# Patient Record
Sex: FEMALE | Race: WHITE | Employment: OTHER | URBAN - METROPOLITAN AREA
[De-identification: names, ages, dates, MRNs, and addresses within clinical notes are randomized per-mention and may not be internally consistent; named-entity substitution may affect disease eponyms.]

---

## 2018-07-27 ENCOUNTER — TELEPHONE (OUTPATIENT)
Dept: ENT CLINIC | Age: 78
End: 2018-07-27

## 2018-07-27 NOTE — TELEPHONE ENCOUNTER
Patient is scheduled on 09-25-18 for blowing blood out of her nose at times (patient had seen her PCP and maxwell Coyle Already). Patient is added to the waitlist. Please call patient for sooner appt if available, thank you.

## 2018-09-25 ENCOUNTER — TELEPHONE (OUTPATIENT)
Dept: ENT CLINIC | Age: 78
End: 2018-09-25

## 2018-09-25 ENCOUNTER — OFFICE VISIT (OUTPATIENT)
Dept: ENT CLINIC | Age: 78
End: 2018-09-25
Payer: MEDICARE

## 2018-09-25 VITALS
WEIGHT: 150 LBS | SYSTOLIC BLOOD PRESSURE: 152 MMHG | BODY MASS INDEX: 27.6 KG/M2 | DIASTOLIC BLOOD PRESSURE: 92 MMHG | HEART RATE: 69 BPM | OXYGEN SATURATION: 98 % | HEIGHT: 62 IN

## 2018-09-25 DIAGNOSIS — R09.89 GLOBUS SENSATION: ICD-10-CM

## 2018-09-25 DIAGNOSIS — R49.0 HOARSENESS OF VOICE: ICD-10-CM

## 2018-09-25 DIAGNOSIS — J30.1 SEASONAL ALLERGIC RHINITIS DUE TO POLLEN: Primary | ICD-10-CM

## 2018-09-25 PROCEDURE — 99204 OFFICE O/P NEW MOD 45 MIN: CPT | Performed by: OTOLARYNGOLOGY

## 2018-09-25 RX ORDER — CITALOPRAM 10 MG/1
10 TABLET ORAL DAILY
COMMUNITY
End: 2019-06-18

## 2018-09-25 RX ORDER — HYDROCHLOROTHIAZIDE 25 MG/1
25 TABLET ORAL DAILY
COMMUNITY
End: 2019-05-13 | Stop reason: SDUPTHER

## 2018-09-25 RX ORDER — BENZONATATE 100 MG/1
100 CAPSULE ORAL 3 TIMES DAILY PRN
COMMUNITY
End: 2019-04-09 | Stop reason: ALTCHOICE

## 2018-09-25 RX ORDER — SIMVASTATIN 10 MG
10 TABLET ORAL NIGHTLY
COMMUNITY
End: 2019-06-18

## 2018-09-25 RX ORDER — MELOXICAM 15 MG/1
15 TABLET ORAL DAILY
COMMUNITY
End: 2019-06-18

## 2018-09-25 RX ORDER — GABAPENTIN 300 MG/1
300 CAPSULE ORAL 4 TIMES DAILY
COMMUNITY
End: 2019-10-25 | Stop reason: SDUPTHER

## 2018-09-25 RX ORDER — LANOLIN ALCOHOL/MO/W.PET/CERES
1000 CREAM (GRAM) TOPICAL DAILY
COMMUNITY
End: 2022-06-03

## 2018-09-25 RX ORDER — FLUTICASONE PROPIONATE 50 MCG
2 SPRAY, SUSPENSION (ML) NASAL DAILY
Qty: 1 BOTTLE | Refills: 3 | Status: SHIPPED | OUTPATIENT
Start: 2018-09-25 | End: 2019-04-09 | Stop reason: ALTCHOICE

## 2018-09-25 RX ORDER — CARVEDILOL 12.5 MG/1
12.5 TABLET ORAL 2 TIMES DAILY WITH MEALS
COMMUNITY
End: 2019-10-25 | Stop reason: SDUPTHER

## 2018-09-25 RX ORDER — TIZANIDINE HYDROCHLORIDE 4 MG/1
4 CAPSULE, GELATIN COATED ORAL 3 TIMES DAILY PRN
Status: ON HOLD | COMMUNITY
End: 2019-05-06 | Stop reason: SDUPTHER

## 2018-09-25 ASSESSMENT — ENCOUNTER SYMPTOMS
EYES NEGATIVE: 1
SINUS PRESSURE: 1
GASTROINTESTINAL NEGATIVE: 1
RESPIRATORY NEGATIVE: 1
SHORTNESS OF BREATH: 0
RHINORRHEA: 1
ABDOMINAL PAIN: 0
COLOR CHANGE: 0

## 2018-09-25 NOTE — PROGRESS NOTES
Subjective:      Patient ID:  Kanu Borges is a 66 y.o. female. HPI:    Hoarseness  Patient presents with hoarseness. The patient describes moderate episodes of hoarseness which are unchanged over time. The episodes began 8 month(s) ago. Symptoms of gastroesophageal reflux is noted. Tx: Prilosec and none     Symptoms of allergic rhinitis is noted. Tx: nothing additional PT had tried a nasal spray but gave her a headache      Trauma/Surgeries in the chest or neck? no  Type:     Previous prolonged intubation: no  Time:   ago. Pt has noted PND for years and thinks she has had sinus issues as well. History   Smoking Status    Never Smoker   Smokeless Tobacco    Never Used     Social History     Social History    Marital status: Single     Spouse name: N/A    Number of children: N/A    Years of education: N/A     Social History Main Topics    Smoking status: Never Smoker    Smokeless tobacco: Never Used    Alcohol use Yes      Comment: occ    Drug use: No    Sexual activity: Not Asked     Other Topics Concern    None     Social History Narrative    None           Patient's medications, allergies, past medical, surgical, social and family histories were reviewed and updated as appropriate. Review of Systems   Constitutional: Negative. Negative for fever. HENT: Positive for congestion, postnasal drip, rhinorrhea, sinus pressure and sneezing. Eyes: Negative. Negative for visual disturbance. Respiratory: Negative. Negative for shortness of breath. Cardiovascular: Negative. Negative for chest pain. Gastrointestinal: Negative. Negative for abdominal pain. Genitourinary: Negative. Musculoskeletal: Negative. Skin: Negative. Negative for color change. Allergic/Immunologic: Positive for environmental allergies. Neurological: Negative. Psychiatric/Behavioral: Negative. Negative for behavioral problems and hallucinations.    All other systems

## 2018-09-25 NOTE — TELEPHONE ENCOUNTER
Patient called needing rx for flonase sent to [facility] on [address] instead of [employer]. I called [facility] and cxl the rx and had it filled at [facility] on [address].     Electronically signed by Creighton Eisenmenger, MA on 9/25/18 at 9:25 AM

## 2018-11-02 ENCOUNTER — OFFICE VISIT (OUTPATIENT)
Dept: ENT CLINIC | Age: 78
End: 2018-11-02
Payer: MEDICARE

## 2018-11-02 VITALS
BODY MASS INDEX: 26.22 KG/M2 | SYSTOLIC BLOOD PRESSURE: 141 MMHG | HEART RATE: 60 BPM | DIASTOLIC BLOOD PRESSURE: 82 MMHG | WEIGHT: 148 LBS | OXYGEN SATURATION: 97 % | HEIGHT: 63 IN

## 2018-11-02 DIAGNOSIS — R49.0 HOARSENESS OF VOICE: ICD-10-CM

## 2018-11-02 DIAGNOSIS — R09.89 GLOBUS SENSATION: ICD-10-CM

## 2018-11-02 DIAGNOSIS — J30.1 SEASONAL ALLERGIC RHINITIS DUE TO POLLEN: Primary | ICD-10-CM

## 2018-11-02 PROCEDURE — 99213 OFFICE O/P EST LOW 20 MIN: CPT | Performed by: OTOLARYNGOLOGY

## 2018-11-02 RX ORDER — AZELASTINE 1 MG/ML
1 SPRAY, METERED NASAL 2 TIMES DAILY
Qty: 1 BOTTLE | Refills: 3 | Status: SHIPPED | OUTPATIENT
Start: 2018-11-02 | End: 2019-02-12 | Stop reason: CLARIF

## 2018-11-02 ASSESSMENT — ENCOUNTER SYMPTOMS
GASTROINTESTINAL NEGATIVE: 1
RESPIRATORY NEGATIVE: 1
SINUS PRESSURE: 1
SHORTNESS OF BREATH: 0
RHINORRHEA: 1
COLOR CHANGE: 0
ABDOMINAL PAIN: 0
EYES NEGATIVE: 1

## 2018-12-18 ENCOUNTER — OFFICE VISIT (OUTPATIENT)
Dept: PAIN MANAGEMENT | Age: 78
End: 2018-12-18
Payer: MEDICARE

## 2018-12-18 VITALS
BODY MASS INDEX: 25.76 KG/M2 | TEMPERATURE: 98 F | HEART RATE: 64 BPM | HEIGHT: 62 IN | DIASTOLIC BLOOD PRESSURE: 68 MMHG | RESPIRATION RATE: 16 BRPM | WEIGHT: 140 LBS | SYSTOLIC BLOOD PRESSURE: 132 MMHG

## 2018-12-18 DIAGNOSIS — G89.4 CHRONIC PAIN SYNDROME: Primary | ICD-10-CM

## 2018-12-18 DIAGNOSIS — G89.29 CHRONIC BILATERAL LOW BACK PAIN WITH RIGHT-SIDED SCIATICA: ICD-10-CM

## 2018-12-18 DIAGNOSIS — M54.41 CHRONIC BILATERAL LOW BACK PAIN WITH RIGHT-SIDED SCIATICA: ICD-10-CM

## 2018-12-18 DIAGNOSIS — M96.1 LUMBAR POST-LAMINECTOMY SYNDROME: ICD-10-CM

## 2018-12-18 DIAGNOSIS — M54.16 LUMBAR RADICULOPATHY: ICD-10-CM

## 2018-12-18 PROCEDURE — 99204 OFFICE O/P NEW MOD 45 MIN: CPT | Performed by: PAIN MEDICINE

## 2018-12-18 RX ORDER — PANTOPRAZOLE SODIUM 40 MG/1
40 GRANULE, DELAYED RELEASE ORAL
COMMUNITY
End: 2019-06-18

## 2018-12-18 ASSESSMENT — PATIENT HEALTH QUESTIONNAIRE - PHQ9
2. FEELING DOWN, DEPRESSED OR HOPELESS: 0
SUM OF ALL RESPONSES TO PHQ9 QUESTIONS 1 & 2: 0
SUM OF ALL RESPONSES TO PHQ QUESTIONS 1-9: 0
SUM OF ALL RESPONSES TO PHQ QUESTIONS 1-9: 0
1. LITTLE INTEREST OR PLEASURE IN DOING THINGS: 0

## 2018-12-20 ENCOUNTER — HOSPITAL ENCOUNTER (OUTPATIENT)
Age: 78
Discharge: HOME | End: 2018-12-22
Payer: MEDICARE

## 2018-12-20 DIAGNOSIS — G89.29 CHRONIC BILATERAL LOW BACK PAIN WITH RIGHT-SIDED SCIATICA: ICD-10-CM

## 2018-12-20 DIAGNOSIS — M54.16 LUMBAR RADICULOPATHY: ICD-10-CM

## 2018-12-20 DIAGNOSIS — M96.1 LUMBAR POST-LAMINECTOMY SYNDROME: ICD-10-CM

## 2018-12-20 DIAGNOSIS — G89.4 CHRONIC PAIN SYNDROME: ICD-10-CM

## 2018-12-20 DIAGNOSIS — M54.41 CHRONIC BILATERAL LOW BACK PAIN WITH RIGHT-SIDED SCIATICA: ICD-10-CM

## 2018-12-20 LAB
BUN BLDV-MCNC: 13 MG/DL (ref 8–23)
CREAT SERPL-MCNC: 0.8 MG/DL (ref 0.5–1)
GFR AFRICAN AMERICAN: >60
GFR NON-AFRICAN AMERICAN: >60 ML/MIN/1.73

## 2018-12-20 PROCEDURE — 36415 COLL VENOUS BLD VENIPUNCTURE: CPT

## 2018-12-20 PROCEDURE — 82565 ASSAY OF CREATININE: CPT

## 2018-12-20 PROCEDURE — 84520 ASSAY OF UREA NITROGEN: CPT

## 2019-01-16 ENCOUNTER — OFFICE VISIT (OUTPATIENT)
Dept: PAIN MANAGEMENT | Age: 79
End: 2019-01-16
Payer: MEDICARE

## 2019-01-16 VITALS
TEMPERATURE: 98.4 F | HEIGHT: 63 IN | SYSTOLIC BLOOD PRESSURE: 124 MMHG | OXYGEN SATURATION: 98 % | HEART RATE: 78 BPM | RESPIRATION RATE: 18 BRPM | BODY MASS INDEX: 25.69 KG/M2 | DIASTOLIC BLOOD PRESSURE: 70 MMHG | WEIGHT: 145 LBS

## 2019-01-16 DIAGNOSIS — G89.29 CHRONIC BILATERAL LOW BACK PAIN WITH RIGHT-SIDED SCIATICA: ICD-10-CM

## 2019-01-16 DIAGNOSIS — M54.41 CHRONIC BILATERAL LOW BACK PAIN WITH RIGHT-SIDED SCIATICA: ICD-10-CM

## 2019-01-16 DIAGNOSIS — M54.16 LUMBAR RADICULOPATHY: ICD-10-CM

## 2019-01-16 DIAGNOSIS — M96.1 LUMBAR POST-LAMINECTOMY SYNDROME: ICD-10-CM

## 2019-01-16 DIAGNOSIS — G89.4 CHRONIC PAIN SYNDROME: Primary | ICD-10-CM

## 2019-01-16 PROCEDURE — 99213 OFFICE O/P EST LOW 20 MIN: CPT | Performed by: PAIN MEDICINE

## 2019-01-16 RX ORDER — HYDROCODONE BITARTRATE AND ACETAMINOPHEN 5; 325 MG/1; MG/1
1 TABLET ORAL 3 TIMES DAILY PRN
Qty: 90 TABLET | Refills: 0 | Status: SHIPPED | OUTPATIENT
Start: 2019-01-16 | End: 2019-02-12 | Stop reason: SDUPTHER

## 2019-01-21 ENCOUNTER — OFFICE VISIT (OUTPATIENT)
Dept: PAIN MANAGEMENT | Age: 79
End: 2019-01-21
Payer: MEDICARE

## 2019-01-21 DIAGNOSIS — F45.1 SOMATIC SYMPTOM DISORDER, PERSISTENT, SEVERE, WITH PREDOMINANT PAIN: Primary | ICD-10-CM

## 2019-01-21 PROCEDURE — 96130 PSYCL TST EVAL PHYS/QHP 1ST: CPT | Performed by: PSYCHOLOGIST

## 2019-02-01 ENCOUNTER — HOSPITAL ENCOUNTER (OUTPATIENT)
Age: 79
Discharge: HOME | End: 2019-02-01
Payer: MEDICARE

## 2019-02-01 ENCOUNTER — HOSPITAL ENCOUNTER (OUTPATIENT)
Dept: MRI IMAGING | Age: 79
Discharge: HOME | End: 2019-02-03
Payer: MEDICARE

## 2019-02-01 LAB
BUN BLDV-MCNC: 17 MG/DL (ref 8–23)
CREAT SERPL-MCNC: 0.8 MG/DL (ref 0.5–1)
GFR AFRICAN AMERICAN: >60
GFR NON-AFRICAN AMERICAN: >60 ML/MIN/1.73

## 2019-02-01 PROCEDURE — 72146 MRI CHEST SPINE W/O DYE: CPT

## 2019-02-01 PROCEDURE — 72158 MRI LUMBAR SPINE W/O & W/DYE: CPT

## 2019-02-01 PROCEDURE — 84520 ASSAY OF UREA NITROGEN: CPT

## 2019-02-01 PROCEDURE — A9577 INJ MULTIHANCE: HCPCS | Performed by: RADIOLOGY

## 2019-02-01 PROCEDURE — 36415 COLL VENOUS BLD VENIPUNCTURE: CPT

## 2019-02-01 PROCEDURE — 6360000004 HC RX CONTRAST MEDICATION: Performed by: RADIOLOGY

## 2019-02-01 PROCEDURE — 82565 ASSAY OF CREATININE: CPT

## 2019-02-01 RX ADMIN — GADOBENATE DIMEGLUMINE 15 ML: 529 INJECTION, SOLUTION INTRAVENOUS at 13:06

## 2019-02-12 ENCOUNTER — OFFICE VISIT (OUTPATIENT)
Dept: PAIN MANAGEMENT | Age: 79
End: 2019-02-12
Payer: MEDICARE

## 2019-02-12 VITALS
WEIGHT: 143 LBS | SYSTOLIC BLOOD PRESSURE: 120 MMHG | DIASTOLIC BLOOD PRESSURE: 80 MMHG | RESPIRATION RATE: 16 BRPM | TEMPERATURE: 98.4 F | OXYGEN SATURATION: 92 % | BODY MASS INDEX: 25.34 KG/M2 | HEIGHT: 63 IN | HEART RATE: 73 BPM

## 2019-02-12 DIAGNOSIS — M96.1 LUMBAR POST-LAMINECTOMY SYNDROME: ICD-10-CM

## 2019-02-12 DIAGNOSIS — M54.41 CHRONIC BILATERAL LOW BACK PAIN WITH RIGHT-SIDED SCIATICA: ICD-10-CM

## 2019-02-12 DIAGNOSIS — M54.16 LUMBAR RADICULOPATHY: ICD-10-CM

## 2019-02-12 DIAGNOSIS — G89.29 CHRONIC BILATERAL LOW BACK PAIN WITH RIGHT-SIDED SCIATICA: ICD-10-CM

## 2019-02-12 DIAGNOSIS — G89.4 CHRONIC PAIN SYNDROME: Primary | ICD-10-CM

## 2019-02-12 PROCEDURE — 99213 OFFICE O/P EST LOW 20 MIN: CPT | Performed by: PAIN MEDICINE

## 2019-02-12 PROCEDURE — 99214 OFFICE O/P EST MOD 30 MIN: CPT | Performed by: PAIN MEDICINE

## 2019-02-12 RX ORDER — HYDROCODONE BITARTRATE AND ACETAMINOPHEN 5; 325 MG/1; MG/1
1 TABLET ORAL 3 TIMES DAILY PRN
Qty: 90 TABLET | Refills: 0 | Status: SHIPPED | OUTPATIENT
Start: 2019-02-16 | End: 2019-03-12 | Stop reason: SDUPTHER

## 2019-03-12 ENCOUNTER — OFFICE VISIT (OUTPATIENT)
Dept: PAIN MANAGEMENT | Age: 79
End: 2019-03-12
Payer: MEDICARE

## 2019-03-12 VITALS
DIASTOLIC BLOOD PRESSURE: 70 MMHG | TEMPERATURE: 98.3 F | HEART RATE: 62 BPM | SYSTOLIC BLOOD PRESSURE: 130 MMHG | RESPIRATION RATE: 16 BRPM

## 2019-03-12 DIAGNOSIS — M54.41 CHRONIC BILATERAL LOW BACK PAIN WITH RIGHT-SIDED SCIATICA: ICD-10-CM

## 2019-03-12 DIAGNOSIS — M96.1 LUMBAR POST-LAMINECTOMY SYNDROME: ICD-10-CM

## 2019-03-12 DIAGNOSIS — G89.29 CHRONIC BILATERAL LOW BACK PAIN WITH RIGHT-SIDED SCIATICA: ICD-10-CM

## 2019-03-12 DIAGNOSIS — M54.16 LUMBAR RADICULOPATHY: Primary | ICD-10-CM

## 2019-03-12 DIAGNOSIS — G89.4 CHRONIC PAIN SYNDROME: ICD-10-CM

## 2019-03-12 PROCEDURE — 99213 OFFICE O/P EST LOW 20 MIN: CPT | Performed by: PAIN MEDICINE

## 2019-03-12 RX ORDER — HYDROCODONE BITARTRATE AND ACETAMINOPHEN 5; 325 MG/1; MG/1
1 TABLET ORAL 3 TIMES DAILY PRN
Qty: 100 TABLET | Refills: 0 | Status: SHIPPED | OUTPATIENT
Start: 2019-03-18 | End: 2019-04-09 | Stop reason: SDUPTHER

## 2019-03-19 ENCOUNTER — INITIAL CONSULT (OUTPATIENT)
Dept: NEUROSURGERY | Age: 79
End: 2019-03-19
Payer: MEDICARE

## 2019-03-19 VITALS
DIASTOLIC BLOOD PRESSURE: 76 MMHG | HEART RATE: 64 BPM | BODY MASS INDEX: 24.63 KG/M2 | SYSTOLIC BLOOD PRESSURE: 129 MMHG | WEIGHT: 139 LBS | HEIGHT: 63 IN

## 2019-03-19 DIAGNOSIS — M54.41 CHRONIC MIDLINE LOW BACK PAIN WITH RIGHT-SIDED SCIATICA: Primary | ICD-10-CM

## 2019-03-19 DIAGNOSIS — G89.29 CHRONIC MIDLINE LOW BACK PAIN WITH RIGHT-SIDED SCIATICA: Primary | ICD-10-CM

## 2019-03-19 PROCEDURE — 99203 OFFICE O/P NEW LOW 30 MIN: CPT | Performed by: NEUROLOGICAL SURGERY

## 2019-03-19 ASSESSMENT — ENCOUNTER SYMPTOMS
BOWEL INCONTINENCE: 0
ALLERGIC/IMMUNOLOGIC NEGATIVE: 1
ABDOMINAL PAIN: 0
EYES NEGATIVE: 1
GASTROINTESTINAL NEGATIVE: 1
RESPIRATORY NEGATIVE: 1
BACK PAIN: 1

## 2019-03-20 ENCOUNTER — PREP FOR PROCEDURE (OUTPATIENT)
Dept: NEUROSURGERY | Age: 79
End: 2019-03-20

## 2019-03-20 DIAGNOSIS — M96.1 FAILED BACK SURGICAL SYNDROME: Primary | ICD-10-CM

## 2019-03-20 RX ORDER — SODIUM CHLORIDE 9 MG/ML
INJECTION, SOLUTION INTRAVENOUS CONTINUOUS
Status: CANCELLED | OUTPATIENT
Start: 2019-03-20

## 2019-03-20 RX ORDER — SODIUM CHLORIDE 0.9 % (FLUSH) 0.9 %
10 SYRINGE (ML) INJECTION EVERY 12 HOURS SCHEDULED
Status: CANCELLED | OUTPATIENT
Start: 2019-03-20

## 2019-03-20 RX ORDER — SODIUM CHLORIDE 0.9 % (FLUSH) 0.9 %
10 SYRINGE (ML) INJECTION PRN
Status: CANCELLED | OUTPATIENT
Start: 2019-03-20

## 2019-04-04 NOTE — PROGRESS NOTES
[facility], [address] [place] [address] Johny  [phone]    Follow up Note      Lugenia Bright     Date of Visit:  2019    CC:  Patient presents for follow up   Chief Complaint   Patient presents with    Follow-up    Back Pain     low back into buttocks    Hip Pain     right hip into right leg and down to right ankle     HPI:    Pain is unchanged. Change in quality of symptoms:no. Medication side effects:not applicable . Recent diagnostic testing:none. Recent interventional procedures:none. She has been on anticoagulation medications to include Plavix and 81 mg ASA and has not been on herbal supplements. She is not diabetic.     Imaging:   Lumbar F/E films 2018 -   Posterior pedicle screw plates extend from Q07 vertebral level to the   sacrum. Bony demineralization. Previous discectomy with loss of disc   space L1-2. Retrolisthesis L1-2 and anterolisthesis L4-5 S1. Multilevel disc space narrowing. No acute osseous finding. No   instability on flexion-extension views. Lumbar MRI 2018 -   Posterior pedicle screw plates extend from N43 vertebral level to the   sacrum. Bony demineralization. Previous discectomy with loss of disc   space L1-2. Retrolisthesis L1-2 and anterolisthesis L4-5 S1. Multilevel disc space narrowing. No acute osseous finding. No   instability on flexion-extension views. Lumbar MRI 2016 -         Potential Aberrant Drug-Related Behavior: no     Urine Drug Screenin2019 + hydrocodone and tramadol    OARRS report:  2019 consistent  2019 consistent  2019 consistent    Past Medical History:   Diagnosis Date    Hypertension     TIA (transient ischemic attack)        Past Surgical History:   Procedure Laterality Date    BACK SURGERY      x3    CHOLECYSTECTOMY      HERNIA REPAIR  2018    HYSTERECTOMY         Prior to Admission medications    Medication Sig Start Date End Date Taking?  Authorizing Provider   HYDROcodone-acetaminophen (NORCO) 5-325 MG per tablet Take 1 tablet by mouth 3 times daily as needed for Pain for up to 30 days. QID dosing for severe pain up to 10 days out of the month 3/18/19 4/17/19 Yes Ailin Virgen, DO   pantoprazole sodium (PROTONIX) 40 MG PACK packet Take 40 mg by mouth every morning (before breakfast)   Yes Historical Provider, MD   simvastatin (ZOCOR) 10 MG tablet Take 10 mg by mouth nightly   Yes Historical Provider, MD   carvedilol (COREG) 12.5 MG tablet Take 12.5 mg by mouth 2 times daily (with meals)   Yes Historical Provider, MD   hydrochlorothiazide (HYDRODIURIL) 25 MG tablet Take 25 mg by mouth daily   Yes Historical Provider, MD   citalopram (CELEXA) 10 MG tablet Take 10 mg by mouth daily   Yes Historical Provider, MD   gabapentin (NEURONTIN) 300 MG capsule Take 300 mg by mouth 4 times daily. .   Yes Historical Provider, MD   tiZANidine (ZANAFLEX) 4 MG capsule Take 4 mg by mouth 3 times daily as needed for Muscle spasms   Yes Historical Provider, MD   vitamin B-12 (CYANOCOBALAMIN) 1000 MCG tablet Take 1,000 mcg by mouth daily   Yes Historical Provider, MD   vitamin D (D3-1000) 1000 units CAPS Take by mouth daily   Yes Historical Provider, MD   clopidogrel (PLAVIX) 75 MG tablet Take 1 tablet by mouth daily. 9/11/12  Yes Uriel Colunga DO   losartan (COZAAR) 50 MG tablet Take 1 tablet by mouth daily. Patient taking differently: Take 100 mg by mouth daily  9/11/12  Yes Uriel Colunga DO   meloxicam (MOBIC) 15 MG tablet Take 15 mg by mouth daily    Historical Provider, MD   dipyridamole (PERSANTINE) 50 MG tablet Take 1 tablet by mouth 2 times daily (before meals). 9/11/12   Uriel Colunga DO   omeprazole (PRILOSEC) 40 MG capsule Take 1 capsule by mouth Daily.  9/11/12   Uriel Colunga DO       Allergies   Allergen Reactions    Pcn [Penicillins] Rash       Social History     Socioeconomic History    Marital status: Single     Spouse name: Not on file    Number and normal  Tenderness:none  Guarding:none     Lumbar spine:     Spine inspection:surgical scar, decreased lordosis  CVA tenderness:No   Palpation:tenderness paravertebral muscles, left, right and positive. Range of motion:abnormal significantly in lateral bending, flexion, extension rotation bilateral and is painful.     Musculoskeletal:     Trigger points in Paraveteral:absent bilaterally  SI joint tenderness:negative right, negative left              TASH test:not done right, not done             left  Piriformis tenderness:positive right, positive left  Trochanteric bursa tenderness:positive right, negative left  SLR:positive right, negative left, sitting      Extremities:     Tremors:None bilaterally upper and lower  Intact:Yes  Varicose veins:absent   Pulses:present Lt radial  Cyanosis:none  Edema:none x all 4 extremities     Neurological:     Sensory:normal to light touch BLE     Motor:                Right Quadriceps5/5          Left Quadriceps4/5           Right Gastrocnemius5/5    Left Gastrocnemius4/5  Right Ant Tibialis5/5  Left Ant Tibialis5/5  Gait:antalgic     Dermatology:     Skin:no rashes or lesions noted     Assessment/Plan:  LBP and RLE pain in L5 distribution with diffuse weakness of the LLE  Multiple prior lumbar fusion surgeries with Dr. Patricia Solis, most recent 9/2016 extending her fusion from T11 (now noted on updated imaging to be T10) to sacrum (L1-L5 fusion from what I can tell from [facility] records)  On PLAVIX and ASA for Hx TIA's/CVA's  Has failed multiple surgeries, injections, PT, and medications  In review of the below ordered imaging studies, the patient is actually fused from T10-S1 (originally though T11-sacrum) which means she is not a candidate for a percutaneous SCS trial and needs a buried trial with a surgeon. Referral was placed to Dr. Oneida Chu for this.     Exam is unchanged today. Scheduled for buried implant with Dr. Oneida Chu 5/6/2019.     D/c'ed meloxicam due to GI upset and unknown relief of meloxicam, GI upset is improved  RF Norco 5/325 TID #100, with 10 extra tabs for QID dosing 10 days out of month (this month will give # 61 for 19 day supply to get her to her surgical date with Dr. Dominguez Sic)  Continue gabapentin as currently prescribed from outside provider, encouraged to maximize dosing as she's prescribed up to 5x per day but only taking 4 per [address] DVD watched  Psych eval for SCS trial/implant with Dr. Jairo Parry indicates approval for the SCS  Patient encouraged to stay active  Treatment plan discussed with the patient including medication and procedure side effects     Cc:  Referring physician    MALORIE Angulo.

## 2019-04-09 ENCOUNTER — OFFICE VISIT (OUTPATIENT)
Dept: PAIN MANAGEMENT | Age: 79
End: 2019-04-09
Payer: MEDICARE

## 2019-04-09 VITALS
HEIGHT: 63 IN | DIASTOLIC BLOOD PRESSURE: 66 MMHG | RESPIRATION RATE: 16 BRPM | SYSTOLIC BLOOD PRESSURE: 132 MMHG | HEART RATE: 72 BPM | BODY MASS INDEX: 24.8 KG/M2 | WEIGHT: 140 LBS | TEMPERATURE: 97.6 F

## 2019-04-09 DIAGNOSIS — M54.16 LUMBAR RADICULOPATHY: ICD-10-CM

## 2019-04-09 DIAGNOSIS — G89.4 CHRONIC PAIN SYNDROME: Primary | ICD-10-CM

## 2019-04-09 DIAGNOSIS — M96.1 LUMBAR POST-LAMINECTOMY SYNDROME: ICD-10-CM

## 2019-04-09 DIAGNOSIS — G89.29 CHRONIC BILATERAL LOW BACK PAIN WITH RIGHT-SIDED SCIATICA: ICD-10-CM

## 2019-04-09 DIAGNOSIS — M54.41 CHRONIC BILATERAL LOW BACK PAIN WITH RIGHT-SIDED SCIATICA: ICD-10-CM

## 2019-04-09 PROCEDURE — 99213 OFFICE O/P EST LOW 20 MIN: CPT | Performed by: PAIN MEDICINE

## 2019-04-09 RX ORDER — HYDROCODONE BITARTRATE AND ACETAMINOPHEN 5; 325 MG/1; MG/1
1 TABLET ORAL 3 TIMES DAILY PRN
Qty: 63 TABLET | Refills: 0 | Status: ON HOLD | OUTPATIENT
Start: 2019-04-17 | End: 2019-05-06 | Stop reason: SDUPTHER

## 2019-04-09 NOTE — PROGRESS NOTES
Tamara Leon presents to the [address] [address] on 4/9/2019. Josiah Ignacio is complaining of pain low back across buttocks into right hip,right leg, and into right ankle. The pain is constant. The pain is described as aching, stabbing, sharp and burning. Pain is rated on her best day at a 8, on her worst day at a 10, and on average at a 8 on the VAS scale. She took her last dose of Norco last night.         Any procedures since your last visit: No    She has been on anticoagulation medications to include Plavix and Aspirin is managed by dr Mikael Vera, family .     Pacemaker or defibrilator: No.       /66   Pulse 72   Temp 97.6 °F (36.4 °C) (Oral)   Resp 16   Ht 5' 3\" (1.6 m)   Wt 140 lb (63.5 kg)   BMI 24.80 kg/m²

## 2019-04-23 RX ORDER — LOSARTAN POTASSIUM 100 MG/1
100 TABLET ORAL DAILY
COMMUNITY
End: 2019-06-18

## 2019-04-30 ENCOUNTER — HOSPITAL ENCOUNTER (OUTPATIENT)
Dept: PREADMISSION TESTING | Age: 79
Discharge: HOME | End: 2019-04-30
Payer: MEDICARE

## 2019-04-30 ENCOUNTER — HOSPITAL ENCOUNTER (OUTPATIENT)
Dept: GENERAL RADIOLOGY | Age: 79
Discharge: HOME | End: 2019-05-02
Payer: MEDICARE

## 2019-04-30 VITALS
WEIGHT: 139 LBS | DIASTOLIC BLOOD PRESSURE: 75 MMHG | HEIGHT: 63 IN | BODY MASS INDEX: 24.63 KG/M2 | OXYGEN SATURATION: 99 % | SYSTOLIC BLOOD PRESSURE: 165 MMHG | RESPIRATION RATE: 18 BRPM | TEMPERATURE: 98.4 F | HEART RATE: 63 BPM

## 2019-04-30 DIAGNOSIS — M96.1 FAILED BACK SURGICAL SYNDROME: ICD-10-CM

## 2019-04-30 DIAGNOSIS — Z01.812 PRE-OPERATIVE LABORATORY EXAMINATION: Primary | ICD-10-CM

## 2019-04-30 LAB
ANION GAP SERPL CALCULATED.3IONS-SCNC: 7 MMOL/L (ref 7–16)
BUN BLDV-MCNC: 11 MG/DL (ref 8–23)
CALCIUM SERPL-MCNC: 9.4 MG/DL (ref 8.6–10.2)
CHLORIDE BLD-SCNC: 94 MMOL/L (ref 98–107)
CO2: 34 MMOL/L (ref 22–29)
CREAT SERPL-MCNC: 0.7 MG/DL (ref 0.5–1)
EKG ATRIAL RATE: 58 BPM
EKG P AXIS: 50 DEGREES
EKG P-R INTERVAL: 140 MS
EKG Q-T INTERVAL: 422 MS
EKG QRS DURATION: 88 MS
EKG QTC CALCULATION (BAZETT): 414 MS
EKG R AXIS: 43 DEGREES
EKG T AXIS: 54 DEGREES
EKG VENTRICULAR RATE: 58 BPM
GFR AFRICAN AMERICAN: >60
GFR NON-AFRICAN AMERICAN: >60 ML/MIN/1.73
GLUCOSE BLD-MCNC: 136 MG/DL (ref 74–99)
INR BLD: 1
POTASSIUM SERPL-SCNC: 4.3 MMOL/L (ref 3.5–5)
PROTHROMBIN TIME: 11.7 SEC (ref 9.3–12.4)
SODIUM BLD-SCNC: 135 MMOL/L (ref 132–146)

## 2019-04-30 PROCEDURE — 93005 ELECTROCARDIOGRAM TRACING: CPT | Performed by: PHYSICIAN ASSISTANT

## 2019-04-30 PROCEDURE — 71046 X-RAY EXAM CHEST 2 VIEWS: CPT

## 2019-04-30 PROCEDURE — 85610 PROTHROMBIN TIME: CPT

## 2019-04-30 PROCEDURE — 93010 ELECTROCARDIOGRAM REPORT: CPT | Performed by: INTERNAL MEDICINE

## 2019-04-30 PROCEDURE — 36415 COLL VENOUS BLD VENIPUNCTURE: CPT

## 2019-04-30 PROCEDURE — 80048 BASIC METABOLIC PNL TOTAL CA: CPT

## 2019-04-30 ASSESSMENT — PAIN DESCRIPTION - ORIENTATION: ORIENTATION: LOWER

## 2019-04-30 ASSESSMENT — PAIN DESCRIPTION - FREQUENCY: FREQUENCY: CONTINUOUS

## 2019-04-30 ASSESSMENT — PAIN DESCRIPTION - DESCRIPTORS: DESCRIPTORS: ACHING;BURNING

## 2019-04-30 ASSESSMENT — PAIN DESCRIPTION - PAIN TYPE: TYPE: CHRONIC PAIN

## 2019-04-30 ASSESSMENT — PAIN DESCRIPTION - PROGRESSION: CLINICAL_PROGRESSION: GRADUALLY WORSENING

## 2019-04-30 ASSESSMENT — PAIN SCALES - GENERAL: PAINLEVEL_OUTOF10: 7

## 2019-04-30 ASSESSMENT — PAIN DESCRIPTION - LOCATION: LOCATION: BACK

## 2019-05-06 ENCOUNTER — ANESTHESIA (OUTPATIENT)
Dept: OPERATING ROOM | Age: 79
End: 2019-05-06
Payer: MEDICARE

## 2019-05-06 ENCOUNTER — ANESTHESIA EVENT (OUTPATIENT)
Dept: OPERATING ROOM | Age: 79
End: 2019-05-06
Payer: MEDICARE

## 2019-05-06 ENCOUNTER — HOSPITAL ENCOUNTER (OUTPATIENT)
Dept: GENERAL RADIOLOGY | Age: 79
Discharge: HOME | End: 2019-05-08
Payer: MEDICARE

## 2019-05-06 ENCOUNTER — HOSPITAL ENCOUNTER (OUTPATIENT)
Age: 79
Setting detail: OUTPATIENT SURGERY
Discharge: HOME | End: 2019-05-06
Attending: NEUROLOGICAL SURGERY | Admitting: NEUROLOGICAL SURGERY
Payer: MEDICARE

## 2019-05-06 VITALS
TEMPERATURE: 98 F | SYSTOLIC BLOOD PRESSURE: 125 MMHG | BODY MASS INDEX: 24.63 KG/M2 | HEART RATE: 79 BPM | HEIGHT: 63 IN | RESPIRATION RATE: 17 BRPM | DIASTOLIC BLOOD PRESSURE: 78 MMHG | OXYGEN SATURATION: 94 % | WEIGHT: 139 LBS

## 2019-05-06 VITALS
OXYGEN SATURATION: 100 % | SYSTOLIC BLOOD PRESSURE: 144 MMHG | DIASTOLIC BLOOD PRESSURE: 92 MMHG | TEMPERATURE: 95.5 F | RESPIRATION RATE: 11 BRPM

## 2019-05-06 DIAGNOSIS — M54.41 CHRONIC BILATERAL LOW BACK PAIN WITH RIGHT-SIDED SCIATICA: ICD-10-CM

## 2019-05-06 DIAGNOSIS — G89.4 CHRONIC PAIN SYNDROME: ICD-10-CM

## 2019-05-06 DIAGNOSIS — R52 PAIN: ICD-10-CM

## 2019-05-06 DIAGNOSIS — Z01.812 PRE-OPERATIVE LABORATORY EXAMINATION: Primary | ICD-10-CM

## 2019-05-06 DIAGNOSIS — M54.16 LUMBAR RADICULOPATHY: ICD-10-CM

## 2019-05-06 DIAGNOSIS — M96.1 LUMBAR POST-LAMINECTOMY SYNDROME: ICD-10-CM

## 2019-05-06 DIAGNOSIS — G89.29 CHRONIC BILATERAL LOW BACK PAIN WITH RIGHT-SIDED SCIATICA: ICD-10-CM

## 2019-05-06 PROCEDURE — 2500000003 HC RX 250 WO HCPCS: Performed by: ANESTHESIOLOGIST ASSISTANT

## 2019-05-06 PROCEDURE — 3600000003 HC SURGERY LEVEL 3 BASE: Performed by: NEUROLOGICAL SURGERY

## 2019-05-06 PROCEDURE — C1713 ANCHOR/SCREW BN/BN,TIS/BN: HCPCS | Performed by: NEUROLOGICAL SURGERY

## 2019-05-06 PROCEDURE — 2709999900 HC NON-CHARGEABLE SUPPLY: Performed by: NEUROLOGICAL SURGERY

## 2019-05-06 PROCEDURE — 63655 IMPLANT NEUROELECTRODES: CPT | Performed by: NEUROLOGICAL SURGERY

## 2019-05-06 PROCEDURE — 6360000002 HC RX W HCPCS: Performed by: PHYSICIAN ASSISTANT

## 2019-05-06 PROCEDURE — 7100000001 HC PACU RECOVERY - ADDTL 15 MIN: Performed by: NEUROLOGICAL SURGERY

## 2019-05-06 PROCEDURE — 3600000013 HC SURGERY LEVEL 3 ADDTL 15MIN: Performed by: NEUROLOGICAL SURGERY

## 2019-05-06 PROCEDURE — 2580000003 HC RX 258: Performed by: PHYSICIAN ASSISTANT

## 2019-05-06 PROCEDURE — 6370000000 HC RX 637 (ALT 250 FOR IP): Performed by: ANESTHESIOLOGY

## 2019-05-06 PROCEDURE — 2780000010 HC IMPLANT OTHER: Performed by: NEUROLOGICAL SURGERY

## 2019-05-06 PROCEDURE — 3700000000 HC ANESTHESIA ATTENDED CARE: Performed by: NEUROLOGICAL SURGERY

## 2019-05-06 PROCEDURE — 6360000002 HC RX W HCPCS: Performed by: NEUROLOGICAL SURGERY

## 2019-05-06 PROCEDURE — 7100000011 HC PHASE II RECOVERY - ADDTL 15 MIN: Performed by: NEUROLOGICAL SURGERY

## 2019-05-06 PROCEDURE — 3209999900 FLUORO FOR SURGICAL PROCEDURES

## 2019-05-06 PROCEDURE — 2500000003 HC RX 250 WO HCPCS: Performed by: NEUROLOGICAL SURGERY

## 2019-05-06 PROCEDURE — 3700000001 HC ADD 15 MINUTES (ANESTHESIA): Performed by: NEUROLOGICAL SURGERY

## 2019-05-06 PROCEDURE — 7100000000 HC PACU RECOVERY - FIRST 15 MIN: Performed by: NEUROLOGICAL SURGERY

## 2019-05-06 PROCEDURE — C1778 LEAD, NEUROSTIMULATOR: HCPCS | Performed by: NEUROLOGICAL SURGERY

## 2019-05-06 PROCEDURE — 7100000010 HC PHASE II RECOVERY - FIRST 15 MIN: Performed by: NEUROLOGICAL SURGERY

## 2019-05-06 PROCEDURE — 6360000002 HC RX W HCPCS: Performed by: ANESTHESIOLOGIST ASSISTANT

## 2019-05-06 DEVICE — ANCHOR
Type: IMPLANTABLE DEVICE | Site: SPINE THORACIC | Status: FUNCTIONAL
Brand: CLIK™ X

## 2019-05-06 DEVICE — 50CM 4X8 SURGICAL LEAD KIT
Type: IMPLANTABLE DEVICE | Site: SPINE THORACIC | Status: FUNCTIONAL
Brand: COVEREDGE™ 32

## 2019-05-06 RX ORDER — FENTANYL CITRATE 50 UG/ML
INJECTION, SOLUTION INTRAMUSCULAR; INTRAVENOUS PRN
Status: DISCONTINUED | OUTPATIENT
Start: 2019-05-06 | End: 2019-05-06 | Stop reason: SDUPTHER

## 2019-05-06 RX ORDER — TIZANIDINE HYDROCHLORIDE 4 MG/1
4 CAPSULE, GELATIN COATED ORAL 3 TIMES DAILY PRN
Qty: 21 CAPSULE | Refills: 0 | Status: SHIPPED | OUTPATIENT
Start: 2019-05-06 | End: 2019-05-13

## 2019-05-06 RX ORDER — EPHEDRINE SULFATE/0.9% NACL/PF 50 MG/5 ML
SYRINGE (ML) INTRAVENOUS PRN
Status: DISCONTINUED | OUTPATIENT
Start: 2019-05-06 | End: 2019-05-06 | Stop reason: SDUPTHER

## 2019-05-06 RX ORDER — DEXAMETHASONE SODIUM PHOSPHATE 10 MG/ML
INJECTION INTRAMUSCULAR; INTRAVENOUS PRN
Status: DISCONTINUED | OUTPATIENT
Start: 2019-05-06 | End: 2019-05-06 | Stop reason: SDUPTHER

## 2019-05-06 RX ORDER — ERYTHROMYCIN 5 MG/G
OINTMENT OPHTHALMIC EVERY 8 HOURS SCHEDULED
Status: DISCONTINUED | OUTPATIENT
Start: 2019-05-06 | End: 2019-05-06 | Stop reason: HOSPADM

## 2019-05-06 RX ORDER — GLYCOPYRROLATE 1 MG/5 ML
SYRINGE (ML) INTRAVENOUS PRN
Status: DISCONTINUED | OUTPATIENT
Start: 2019-05-06 | End: 2019-05-06 | Stop reason: SDUPTHER

## 2019-05-06 RX ORDER — MIDAZOLAM HYDROCHLORIDE 1 MG/ML
INJECTION INTRAMUSCULAR; INTRAVENOUS PRN
Status: DISCONTINUED | OUTPATIENT
Start: 2019-05-06 | End: 2019-05-06 | Stop reason: SDUPTHER

## 2019-05-06 RX ORDER — ONDANSETRON 2 MG/ML
INJECTION INTRAMUSCULAR; INTRAVENOUS PRN
Status: DISCONTINUED | OUTPATIENT
Start: 2019-05-06 | End: 2019-05-06 | Stop reason: SDUPTHER

## 2019-05-06 RX ORDER — NEOSTIGMINE METHYLSULFATE 1 MG/ML
INJECTION, SOLUTION INTRAVENOUS PRN
Status: DISCONTINUED | OUTPATIENT
Start: 2019-05-06 | End: 2019-05-06 | Stop reason: SDUPTHER

## 2019-05-06 RX ORDER — BUPIVACAINE HYDROCHLORIDE 2.5 MG/ML
INJECTION, SOLUTION EPIDURAL; INFILTRATION; INTRACAUDAL PRN
Status: DISCONTINUED | OUTPATIENT
Start: 2019-05-06 | End: 2019-05-06 | Stop reason: ALTCHOICE

## 2019-05-06 RX ORDER — LIDOCAINE HYDROCHLORIDE AND EPINEPHRINE BITARTRATE 20; .01 MG/ML; MG/ML
INJECTION, SOLUTION SUBCUTANEOUS PRN
Status: DISCONTINUED | OUTPATIENT
Start: 2019-05-06 | End: 2019-05-06 | Stop reason: ALTCHOICE

## 2019-05-06 RX ORDER — HYDROCODONE BITARTRATE AND ACETAMINOPHEN 5; 325 MG/1; MG/1
1 TABLET ORAL EVERY 6 HOURS PRN
Qty: 28 TABLET | Refills: 0 | Status: SHIPPED | OUTPATIENT
Start: 2019-05-06 | End: 2019-05-13

## 2019-05-06 RX ORDER — MORPHINE SULFATE 2 MG/ML
1 INJECTION, SOLUTION INTRAMUSCULAR; INTRAVENOUS EVERY 5 MIN PRN
Status: DISCONTINUED | OUTPATIENT
Start: 2019-05-06 | End: 2019-05-06 | Stop reason: HOSPADM

## 2019-05-06 RX ORDER — HYDROCODONE BITARTRATE AND ACETAMINOPHEN 5; 325 MG/1; MG/1
1 TABLET ORAL PRN
Status: COMPLETED | OUTPATIENT
Start: 2019-05-06 | End: 2019-05-06

## 2019-05-06 RX ORDER — LIDOCAINE HYDROCHLORIDE 20 MG/ML
INJECTION, SOLUTION INTRAVENOUS PRN
Status: DISCONTINUED | OUTPATIENT
Start: 2019-05-06 | End: 2019-05-06 | Stop reason: SDUPTHER

## 2019-05-06 RX ORDER — VANCOMYCIN HYDROCHLORIDE 500 MG/10ML
INJECTION, POWDER, LYOPHILIZED, FOR SOLUTION INTRAVENOUS PRN
Status: DISCONTINUED | OUTPATIENT
Start: 2019-05-06 | End: 2019-05-06 | Stop reason: ALTCHOICE

## 2019-05-06 RX ORDER — ROCURONIUM BROMIDE 10 MG/ML
INJECTION, SOLUTION INTRAVENOUS PRN
Status: DISCONTINUED | OUTPATIENT
Start: 2019-05-06 | End: 2019-05-06 | Stop reason: SDUPTHER

## 2019-05-06 RX ORDER — SODIUM CHLORIDE 0.9 % (FLUSH) 0.9 %
10 SYRINGE (ML) INJECTION EVERY 12 HOURS SCHEDULED
Status: DISCONTINUED | OUTPATIENT
Start: 2019-05-06 | End: 2019-05-06 | Stop reason: HOSPADM

## 2019-05-06 RX ORDER — MEPERIDINE HYDROCHLORIDE 50 MG/ML
12.5 INJECTION INTRAMUSCULAR; INTRAVENOUS; SUBCUTANEOUS EVERY 5 MIN PRN
Status: DISCONTINUED | OUTPATIENT
Start: 2019-05-06 | End: 2019-05-06 | Stop reason: HOSPADM

## 2019-05-06 RX ORDER — SODIUM CHLORIDE 9 MG/ML
INJECTION, SOLUTION INTRAVENOUS CONTINUOUS
Status: DISCONTINUED | OUTPATIENT
Start: 2019-05-06 | End: 2019-05-06 | Stop reason: HOSPADM

## 2019-05-06 RX ORDER — MORPHINE SULFATE 2 MG/ML
2 INJECTION, SOLUTION INTRAMUSCULAR; INTRAVENOUS EVERY 5 MIN PRN
Status: DISCONTINUED | OUTPATIENT
Start: 2019-05-06 | End: 2019-05-06 | Stop reason: HOSPADM

## 2019-05-06 RX ORDER — SODIUM CHLORIDE 0.9 % (FLUSH) 0.9 %
10 SYRINGE (ML) INJECTION PRN
Status: DISCONTINUED | OUTPATIENT
Start: 2019-05-06 | End: 2019-05-06 | Stop reason: HOSPADM

## 2019-05-06 RX ORDER — HYDROCODONE BITARTRATE AND ACETAMINOPHEN 5; 325 MG/1; MG/1
2 TABLET ORAL PRN
Status: COMPLETED | OUTPATIENT
Start: 2019-05-06 | End: 2019-05-06

## 2019-05-06 RX ORDER — CEPHALEXIN 500 MG/1
500 CAPSULE ORAL 2 TIMES DAILY
Qty: 14 CAPSULE | Refills: 0 | Status: SHIPPED | OUTPATIENT
Start: 2019-05-06 | End: 2019-05-13 | Stop reason: ALTCHOICE

## 2019-05-06 RX ORDER — PROMETHAZINE HYDROCHLORIDE 25 MG/ML
6.25 INJECTION, SOLUTION INTRAMUSCULAR; INTRAVENOUS EVERY 10 MIN PRN
Status: DISCONTINUED | OUTPATIENT
Start: 2019-05-06 | End: 2019-05-06 | Stop reason: HOSPADM

## 2019-05-06 RX ORDER — PROPOFOL 10 MG/ML
INJECTION, EMULSION INTRAVENOUS PRN
Status: DISCONTINUED | OUTPATIENT
Start: 2019-05-06 | End: 2019-05-06 | Stop reason: SDUPTHER

## 2019-05-06 RX ADMIN — Medication 0.6 MG: at 08:01

## 2019-05-06 RX ADMIN — Medication 5 MG: at 06:44

## 2019-05-06 RX ADMIN — Medication 5 MG: at 06:50

## 2019-05-06 RX ADMIN — MIDAZOLAM HYDROCHLORIDE 0.5 MG: 1 INJECTION, SOLUTION INTRAMUSCULAR; INTRAVENOUS at 06:35

## 2019-05-06 RX ADMIN — ERYTHROMYCIN: 5 OINTMENT OPHTHALMIC at 12:03

## 2019-05-06 RX ADMIN — ROCURONIUM BROMIDE 35 MG: 10 INJECTION, SOLUTION INTRAVENOUS at 06:35

## 2019-05-06 RX ADMIN — LIDOCAINE HYDROCHLORIDE 70 MG: 20 INJECTION, SOLUTION INTRAVENOUS at 06:35

## 2019-05-06 RX ADMIN — SODIUM CHLORIDE: 9 INJECTION, SOLUTION INTRAVENOUS at 05:43

## 2019-05-06 RX ADMIN — PROPOFOL 160 MG: 10 INJECTION, EMULSION INTRAVENOUS at 06:35

## 2019-05-06 RX ADMIN — HYDROCODONE BITARTRATE AND ACETAMINOPHEN 2 TABLET: 5; 325 TABLET ORAL at 10:31

## 2019-05-06 RX ADMIN — MIDAZOLAM HYDROCHLORIDE 0.5 MG: 1 INJECTION, SOLUTION INTRAMUSCULAR; INTRAVENOUS at 06:31

## 2019-05-06 RX ADMIN — Medication 5 MG: at 06:46

## 2019-05-06 RX ADMIN — Medication 2 G: at 06:46

## 2019-05-06 RX ADMIN — FENTANYL CITRATE 100 MCG: 50 INJECTION, SOLUTION INTRAMUSCULAR; INTRAVENOUS at 06:35

## 2019-05-06 RX ADMIN — DEXAMETHASONE SODIUM PHOSPHATE 8 MG: 10 INJECTION INTRAMUSCULAR; INTRAVENOUS at 06:52

## 2019-05-06 RX ADMIN — ONDANSETRON HYDROCHLORIDE 4 MG: 2 INJECTION, SOLUTION INTRAMUSCULAR; INTRAVENOUS at 07:50

## 2019-05-06 RX ADMIN — Medication 3 MG: at 08:01

## 2019-05-06 ASSESSMENT — PAIN - FUNCTIONAL ASSESSMENT: PAIN_FUNCTIONAL_ASSESSMENT: 0-10

## 2019-05-06 ASSESSMENT — PULMONARY FUNCTION TESTS
PIF_VALUE: 20
PIF_VALUE: 20
PIF_VALUE: 4
PIF_VALUE: 20
PIF_VALUE: 16
PIF_VALUE: 19
PIF_VALUE: 9
PIF_VALUE: 0
PIF_VALUE: 17
PIF_VALUE: 16
PIF_VALUE: 18
PIF_VALUE: 18
PIF_VALUE: 16
PIF_VALUE: 21
PIF_VALUE: 20
PIF_VALUE: 19
PIF_VALUE: 20
PIF_VALUE: 20
PIF_VALUE: 16
PIF_VALUE: 3
PIF_VALUE: 18
PIF_VALUE: 20
PIF_VALUE: 15
PIF_VALUE: 21
PIF_VALUE: 15
PIF_VALUE: 25
PIF_VALUE: 19
PIF_VALUE: 21
PIF_VALUE: 20
PIF_VALUE: 1
PIF_VALUE: 18
PIF_VALUE: 16
PIF_VALUE: 20
PIF_VALUE: 21
PIF_VALUE: 4
PIF_VALUE: 1
PIF_VALUE: 8
PIF_VALUE: 14
PIF_VALUE: 21
PIF_VALUE: 20
PIF_VALUE: 22
PIF_VALUE: 16
PIF_VALUE: 21
PIF_VALUE: 20
PIF_VALUE: 19
PIF_VALUE: 21
PIF_VALUE: 20
PIF_VALUE: 21
PIF_VALUE: 4
PIF_VALUE: 20
PIF_VALUE: 16
PIF_VALUE: 21
PIF_VALUE: 20
PIF_VALUE: 21
PIF_VALUE: 22
PIF_VALUE: 21
PIF_VALUE: 21
PIF_VALUE: 20
PIF_VALUE: 13
PIF_VALUE: 21
PIF_VALUE: 3
PIF_VALUE: 20
PIF_VALUE: 21
PIF_VALUE: 21
PIF_VALUE: 19
PIF_VALUE: 16
PIF_VALUE: 20
PIF_VALUE: 21
PIF_VALUE: 19
PIF_VALUE: 8
PIF_VALUE: 20
PIF_VALUE: 6
PIF_VALUE: 12
PIF_VALUE: 20
PIF_VALUE: 21
PIF_VALUE: 21
PIF_VALUE: 20
PIF_VALUE: 3
PIF_VALUE: 20
PIF_VALUE: 21
PIF_VALUE: 10
PIF_VALUE: 1
PIF_VALUE: 17
PIF_VALUE: 21
PIF_VALUE: 21
PIF_VALUE: 16
PIF_VALUE: 16

## 2019-05-06 ASSESSMENT — PAIN SCALES - GENERAL
PAINLEVEL_OUTOF10: 4
PAINLEVEL_OUTOF10: 0
PAINLEVEL_OUTOF10: 7
PAINLEVEL_OUTOF10: 0

## 2019-05-06 ASSESSMENT — PAIN DESCRIPTION - DESCRIPTORS
DESCRIPTORS: ACHING;BURNING;CONSTANT
DESCRIPTORS: ACHING;CONSTANT

## 2019-05-06 ASSESSMENT — PAIN DESCRIPTION - ORIENTATION
ORIENTATION: LOWER
ORIENTATION: LOWER

## 2019-05-06 ASSESSMENT — PAIN DESCRIPTION - LOCATION
LOCATION: BACK
LOCATION: BACK

## 2019-05-06 ASSESSMENT — PAIN DESCRIPTION - PAIN TYPE
TYPE: SURGICAL PAIN
TYPE: SURGICAL PAIN

## 2019-05-06 NOTE — ANESTHESIA PRE PROCEDURE
Department of Anesthesiology  Preprocedure Note       Name:  Ilia Alfonso   Age:  66 y.o.  :  1940                                          MRN:  [id number]         Date:  2019      Surgeon: Lieutenant Salgado):  Fátima Tucker MD    Procedure: T8 SPINAL CORD STIMULATOR  PLACEMENT--KODAK HELTON, [employer] (N/A )    Medications prior to admission:   Prior to Admission medications    Medication Sig Start Date End Date Taking? Authorizing Provider   aspirin 81 MG tablet Take 81 mg by mouth daily   Yes Historical Provider, MD   losartan (COZAAR) 100 MG tablet Take 100 mg by mouth daily   Yes Historical Provider, MD   HYDROcodone-acetaminophen (NORCO) 5-325 MG per tablet Take 1 tablet by mouth 3 times daily as needed for Pain for up to 19 days. QID dosing for severe pain up to 10 days out of the month 19 Yes Veronica Mcdonald,    pantoprazole sodium (PROTONIX) 40 MG PACK packet Take 40 mg by mouth every morning (before breakfast)   Yes Historical Provider, MD   simvastatin (ZOCOR) 10 MG tablet Take 10 mg by mouth nightly   Yes Historical Provider, MD   carvedilol (COREG) 12.5 MG tablet Take 12.5 mg by mouth 2 times daily (with meals)   Yes Historical Provider, MD   hydrochlorothiazide (HYDRODIURIL) 25 MG tablet Take 25 mg by mouth daily   Yes Historical Provider, MD   citalopram (CELEXA) 10 MG tablet Take 10 mg by mouth daily   Yes Historical Provider, MD   gabapentin (NEURONTIN) 300 MG capsule Take 300 mg by mouth 4 times daily. .   Yes Historical Provider, MD   tiZANidine (ZANAFLEX) 4 MG capsule Take 4 mg by mouth 3 times daily as needed for Muscle spasms   Yes Historical Provider, MD   vitamin B-12 (CYANOCOBALAMIN) 1000 MCG tablet Take 1,000 mcg by mouth daily   Yes Historical Provider, MD   vitamin D (D3-1000) 1000 units CAPS Take by mouth daily   Yes Historical Provider, MD   clopidogrel (PLAVIX) 75 MG tablet Take 1 tablet by mouth daily.  12  Yes Uriel Colunga DO   meloxicam (MOBIC) 15 MG tablet Take 15 mg by mouth daily    Historical Provider, MD       Current medications:    Current Facility-Administered Medications   Medication Dose Route Frequency Provider Last Rate Last Dose    0.9 % sodium chloride infusion   Intravenous Continuous SEBASTIÁN Grace 125 mL/hr at 05/06/19 0543      ceFAZolin (ANCEF) 2 g in dextrose 5 % 50 mL IVPB  2 g Intravenous On Call to [facility] Adeline , [place]        sodium chloride flush 0.9 % injection 10 mL  10 mL Intravenous 2 times per day SEBASTIÁN Grace        sodium chloride flush 0.9 % injection 10 mL  10 mL Intravenous PRN SEBASTIÁN Grace           Allergies:     Allergies   Allergen Reactions    Pcn [Penicillins] Rash       Problem List:    Patient Active Problem List   Diagnosis Code    TIA (transient ischemic attack) G45.9    Hyperlipidemia E78.5    Chronic pain syndrome G89.4    Chronic bilateral low back pain with right-sided sciatica M54.41, G89.29    Lumbar radiculopathy M54.16    Lumbar post-laminectomy syndrome M96.1       Past Medical History:        Diagnosis Date    Hyperlipidemia     Hypertension     TIA (transient ischemic attack)        Past Surgical History:        Procedure Laterality Date    BACK SURGERY      x3    CHOLECYSTECTOMY      HERNIA REPAIR  2018    HYSTERECTOMY         Social History:    Social History     Tobacco Use    Smoking status: Never Smoker    Smokeless tobacco: Never Used   Substance Use Topics    Alcohol use: Yes     Comment: occ                                Counseling given: Not Answered      Vital Signs (Current):   Vitals:    04/23/19 1230 05/06/19 0519   BP:  (!) 95/55   Pulse:  67   Resp:  18   Temp:  36.7 °C (98 °F)   TempSrc:  Temporal   SpO2:  99%   Weight: 139 lb (63 kg) 139 lb (63 kg)   Height: 5' 3\" (1.6 m) 5' 3\" (1.6 m)                                              BP Readings from Last 3 Encounters:   05/06/19 (!) 95/55   04/30/19 (!) 165/75   04/09/19 132/66       NPO Status: Time

## 2019-05-06 NOTE — PROGRESS NOTES
ADMITTED INTO PACU, REPORT FROM ANESTHESIA,  MONITORS APPLIED, STRIPTS PRINTED, FAMILY CALLED FOR UPDATE.
Admitted to Same Day Surgery. Preop instructions given to patient.
KIRSTEN MIR CALLED. NURSE TO NURSE GIVEN. THE PATIENT'S TEST RESULTS REVIEWED, VITAL SIGNS REPORTED TO RECEIVING NURSE. ANY AND ALL IMPORTANT INFORMATION REGARDING PT DISCLOSED.
Tolerating fluids well.  Mandy Soto
patience is greatly appreciated as you wait for your nurse. Please bring in items such as: books, magazines, newspapers, electronics, or any other items  to occupy your time in the waiting area. [x]  Delays may occur with surgery and staff will make a sincere effort to keep you informed of delays. If any delays occur with your procedure, we apologize ahead of time for your inconvenience as we recognize the value of your time.

## 2019-05-06 NOTE — ANESTHESIA POSTPROCEDURE EVALUATION
Department of Anesthesiology  Postprocedure Note    Patient: Rosie Clemons  MRN: [id number]  YOB: 1940  Date of evaluation: 5/7/2019  Time:  7:21 AM     Procedure Summary     Date:  05/06/19 Room / Location:  [facility] OR 06 / [facility]    Anesthesia Start:  0631 Anesthesia Stop:      Procedure:  T8 SPINAL CORD STIMULATOR  PLACEMENT--SUNITHA, KODAK TABLE, [employer] (N/A ) Diagnosis:  (FAILED BACK SURG. SYNDROME)    Surgeon:  Eryn Drew MD Responsible Provider:  Merlinda Sahara, MD    Anesthesia Type:  general ASA Status:  3          Anesthesia Type: general    Anthony Phase I: Anthony Score: 10    Anthony Phase II: Anthony Score: 10    Last vitals: Reviewed and per EMR flowsheets.        Anesthesia Post Evaluation    Patient location during evaluation: PACU  Patient participation: complete - patient participated  Level of consciousness: awake  Pain score: 3  Airway patency: patent  Nausea & Vomiting: no nausea and no vomiting  Complications: no  Cardiovascular status: blood pressure returned to baseline  Respiratory status: acceptable  Hydration status: euvolemic

## 2019-05-07 NOTE — OP NOTE
[address]                  [address] [facility],  [facility]                                OPERATIVE REPORT    PATIENT NAME: Rehana Miner                   :        1940  MED REC NO:   [id number]                            ROOM:  ACCOUNT NO:   [de-identified]                           ADMIT DATE: 2019  PROVIDER:     Raphael Kumar MD      DATE OF PROCEDURE:  2019    PREOPERATIVE DIAGNOSES:  1. Post-laminectomy syndrome. 2.  Failed back surgery syndrome. 3.  Chronic back pain. POSTOPERATIVE DIAGNOSES:  1. Post-laminectomy syndrome. 2.  Failed back surgery syndrome. 3.  Chronic back pain. OPERATIVE PROCEDURES:  1.  Bilateral T11 laminectomy for placement of T8 [employer]  spinal cord stimulator paddle electrode for open spinal cord stimulator  trial.  2.  Complex programming of T8 spinal cord stimulator. ANESTHESIA:  Generalized endotracheal anesthesia. SURGEON:  Raphael Kumar MD    ASSISTANT:  Dr. Rusty Singer. COMPLICATIONS:  None. ESTIMATED BLOOD LOSS:  50 mL. SPECIMEN:  None. OPERATIVE INDICATIONS:  The patient is a 75-year-old lady who had  previously undergone a thoracolumbar fusion but continued back pain and  post laminectomy syndrome with failed back surgery syndrome. She had  tried all conservative therapy including epidurals, physical therapy and  oral narcotic pain medications without any significant relief, and after  risks, benefits, and alternatives were discussed with the patient, it  was determined that she would undergo an open spinal cord stimulator  trial due to Roplasto hardware placement from prior surgery. OPERATIVE PROCEDURE:  The patient was brought into the operating room. A timeout was performed where she was identified by her name, medical  record number, and the operative procedure which she was about to  undergo.   Next, induction of generalized endotracheal anesthesia was  then commenced. Upon completion of induction of generalized  endotracheal anesthesia, she received preoperative antibiotics. She was  then flipped into prone position on the Rhett table. All pressure  points were padded. Thoracic region was prepped and draped in the usual  sterile fashion. Using intraoperative fluoroscopy, I identified the T11  region. She had an old incision going over T11. I then proceeded to  prep and drape the incision in usual sterile fashion. I then used a #10  blade to make a skin incision. Monopolar cautery was used to dissect  through the subcutaneous tissue. I then subsequently exposed the T11  spinous processes. I performed a subperiosteal dissection to expose the  bilateral lamina at T11. I placed the self-retaining angled cerebellar  retractor into the wound. Next, I used a Leksell rongeur to bite up the  spinous processes of T11. I used a high-speed naveed to thin out the  lamina of T11 bilaterally and I used #4 Kerrison punch to perform a  bilateral T11 laminectomy. Once the laminectomy was completed, I then  proceed to then place the dural dissector into the epidural space up to  T7-T8 interspaces and once this will be able to pass freely, I then  subsequently inserted the paddle electrode to the T7-T8 disk space. After this was done, I proceeded to then place anchors and anchor down  the paddle electrode through the fascia using a 2-0 silk suture. Once I  had anchored this down, I then proceeded to attach the extension leads  to the four leads that came off the paddle and once the extension leads  were placed, I then tunneled out laterally to the patient's right side. Once I tunneled out, I then proceeded to leave the four leads exposed. I then proceed to then inspect the wound for any evidence of bleeding. Adequate hemostasis was obtained with both monopolar and bipolar  cautery.   Upon completion irrigation, irrigating the wound, I proceeded  to close the wound

## 2019-05-09 ENCOUNTER — OFFICE VISIT (OUTPATIENT)
Dept: NEUROSURGERY | Age: 79
End: 2019-05-09

## 2019-05-09 VITALS
DIASTOLIC BLOOD PRESSURE: 102 MMHG | BODY MASS INDEX: 24.45 KG/M2 | SYSTOLIC BLOOD PRESSURE: 163 MMHG | WEIGHT: 138 LBS | HEART RATE: 62 BPM

## 2019-05-09 DIAGNOSIS — M54.16 LUMBAR RADICULOPATHY: Primary | ICD-10-CM

## 2019-05-09 PROCEDURE — 99024 POSTOP FOLLOW-UP VISIT: CPT | Performed by: PHYSICIAN ASSISTANT

## 2019-05-10 ENCOUNTER — PREP FOR PROCEDURE (OUTPATIENT)
Dept: NEUROSURGERY | Age: 79
End: 2019-05-10

## 2019-05-10 DIAGNOSIS — M96.1 FAILED BACK SYNDROME: Primary | ICD-10-CM

## 2019-05-10 RX ORDER — SODIUM CHLORIDE 0.9 % (FLUSH) 0.9 %
10 SYRINGE (ML) INJECTION EVERY 12 HOURS SCHEDULED
Status: CANCELLED | OUTPATIENT
Start: 2019-05-10

## 2019-05-10 RX ORDER — SODIUM CHLORIDE 0.9 % (FLUSH) 0.9 %
10 SYRINGE (ML) INJECTION PRN
Status: CANCELLED | OUTPATIENT
Start: 2019-05-10

## 2019-05-10 RX ORDER — SODIUM CHLORIDE 9 MG/ML
INJECTION, SOLUTION INTRAVENOUS CONTINUOUS
Status: CANCELLED | OUTPATIENT
Start: 2019-05-10

## 2019-05-13 RX ORDER — HYDROCHLOROTHIAZIDE 25 MG/1
25 TABLET ORAL DAILY
Qty: 90 TABLET | Refills: 3 | Status: SHIPPED | OUTPATIENT
Start: 2019-05-13 | End: 2019-10-25 | Stop reason: SDUPTHER

## 2019-05-13 NOTE — PROGRESS NOTES
Ming 36 PRE-ADMISSION TESTING GENERAL INSTRUCTIONS- [facility]-phone number:[phone]    GENERAL INSTRUCTIONS  [x] Antibacterial Soap shower Night before and/or AM of Surgery  [] Abhijeet wipe instruction sheet and wipes given. [x] Nothing by mouth after midnight, including gum, candy, mints, or water. [x] You may brush your teeth, gargle, but do NOT swallow water. []Hibiclens shower  the night before and the morning of surgery. Do not use             Hibiclens on your face or head. []No smoking, chewing tobacco, illegal drugs, or alcohol within 24 hours of your surgery. [] Jewelry, valuables or body piercing's should not be brought to the hospital. All body and/or tongue piercing's must be removed prior to arriving to hospital.  ALL hair pins must be removed. [x] Do not wear makeup, lotions, powders, deodorant. Nail polish as directed by the nurse. [x] Arrange transportation with a responsible adult  to and from the hospital. If you do not have a responsible adult  to transport you, you will need to make arrangements with a medical transportation company (i.e. [employer]. A Uber/taxi/bus is not appropriate unless you are accompanied by a responsible adult ). Arrange for someone to be with you for the remainder of the day and for 24 hours after your procedure due to having had anesthesia. Who will be your  for transportation? CODY Lopez________________   Who will be staying with you for 24 hrs after your procedure?___ERNST_______________  [] Bring insurance card and photo ID.  [] Transfusion Bracelet: Please bring with you to hospital, day of surgery  [] Bring urine specimen day of surgery. Any small container is acceptable. [] Use inhalers the morning of surgery and bring with you to hospital.  [] Bring copy of living will or healthcare power of  papers to be placed in your electronic record.   [] CPAP/BI-PAP: Please bring your machine if you are to spend the night in the hospital.     PARKING INSTRUCTIONS:   [x] Arrival Time:_0900__________  · [x] Parking lot '\"I\"  is located on [facility] (the corner of [facility] and [facility]). To enter, press the button and the gate will lift. A free token will be provided to exit the lot. One car per patient is allowed to park in this lot. All other cars are to park on [address] [place] [address] either in the parking garage or the handicap lot. [] To reach the [facility] lobby from [address] [place] [address], upon entering the hospital, take elevator B to the 3rd floor. EDUCATION INSTRUCTIONS:      [] Knee or hip replacement booklet & exercise pamphlets given. [] Davian 77 placed in chart. [] Pre-admission Testing educational folder given  [] Incentive Spirometry,coughing & deep breathing exercises reviewed. []Medication information sheet(s)   [x]Fluoroscopy-Xray used in surgery reviewed with patient. Educational pamphlet placed in chart. [x]Pain: Post-op pain is normal and to be expected. You will be asked to rate your pain from 0-10(a zero is not acceptable-education is needed). Your post-op pain goal is:5  [x] Ask your nurse for your pain medication. [] Joint camp offered. [] Joint replacement booklets given. [] Other:___________________________    MEDICATION INSTRUCTIONS:   [x]Bring a complete list of your medications, please write the last time you took the medicine, give this list to the nurse. [x] Take the following medications the morning of surgery with 1-2 ounces of water: SEE LIST  [] Stop herbal supplements and vitamins 5 days before your surgery. [] DO NOT take any diabetic medicine the morning of surgery. Follow instructions for insulin the day before surgery. [] If you are diabetic and your blood sugar is low or you feel symptomatic, you may drink 1-2 ounces of apple juice or take a glucose tablet.   The morning of your procedure, you may call the pre-op area if you have concerns about your blood sugar [phone]. [] Use your inhalers the morning of surgery. Bring your emergency inhaler with you day of surgery. [] Follow physician instructions regarding any blood thinners you may be taking. WHAT TO EXPECT:  [x] The day of surgery you will be greeted and checked in by the [employer].  In addition, you will be registered in the [place] by a Patient Access Representative. Please bring your photo ID and insurance card. A nurse will greet you in accordance to the time you are needed in the pre-op area to prepare you for surgery. Please do not be discouraged if you are not greeted in the order you arrive as there are many variables that are involved in patient preparation. Your patience is greatly appreciated as you wait for your nurse. Please bring in items such as: books, magazines, newspapers, electronics, or any other items  to occupy your time in the waiting area. []  Delays may occur with surgery and staff will make a sincere effort to keep you informed of delays. If any delays occur with your procedure, we apologize ahead of time for your inconvenience as we recognize the value of your time.

## 2019-05-14 ENCOUNTER — ANESTHESIA (OUTPATIENT)
Dept: OPERATING ROOM | Age: 79
End: 2019-05-14
Payer: MEDICARE

## 2019-05-14 ENCOUNTER — HOSPITAL ENCOUNTER (OUTPATIENT)
Age: 79
Setting detail: OUTPATIENT SURGERY
Discharge: HOME | End: 2019-05-14
Attending: NEUROLOGICAL SURGERY | Admitting: NEUROLOGICAL SURGERY
Payer: MEDICARE

## 2019-05-14 ENCOUNTER — ANESTHESIA EVENT (OUTPATIENT)
Dept: OPERATING ROOM | Age: 79
End: 2019-05-14
Payer: MEDICARE

## 2019-05-14 VITALS
DIASTOLIC BLOOD PRESSURE: 101 MMHG | RESPIRATION RATE: 2 BRPM | TEMPERATURE: 96.8 F | SYSTOLIC BLOOD PRESSURE: 156 MMHG | OXYGEN SATURATION: 100 %

## 2019-05-14 VITALS
WEIGHT: 139 LBS | DIASTOLIC BLOOD PRESSURE: 74 MMHG | OXYGEN SATURATION: 98 % | HEART RATE: 72 BPM | RESPIRATION RATE: 17 BRPM | TEMPERATURE: 97.8 F | HEIGHT: 63 IN | SYSTOLIC BLOOD PRESSURE: 128 MMHG | BODY MASS INDEX: 24.63 KG/M2

## 2019-05-14 DIAGNOSIS — G89.4 CHRONIC PAIN SYNDROME: Primary | ICD-10-CM

## 2019-05-14 DIAGNOSIS — M96.1 FAILED BACK SYNDROME: ICD-10-CM

## 2019-05-14 LAB
INR BLD: 1
PROTHROMBIN TIME: 11.5 SEC (ref 9.3–12.4)

## 2019-05-14 PROCEDURE — 7100000001 HC PACU RECOVERY - ADDTL 15 MIN: Performed by: NEUROLOGICAL SURGERY

## 2019-05-14 PROCEDURE — C1820 GENERATOR NEURO RECHG BAT SY: HCPCS | Performed by: NEUROLOGICAL SURGERY

## 2019-05-14 PROCEDURE — 6360000002 HC RX W HCPCS: Performed by: NEUROLOGICAL SURGERY

## 2019-05-14 PROCEDURE — 2580000003 HC RX 258: Performed by: PHYSICIAN ASSISTANT

## 2019-05-14 PROCEDURE — 3700000001 HC ADD 15 MINUTES (ANESTHESIA): Performed by: NEUROLOGICAL SURGERY

## 2019-05-14 PROCEDURE — C1787 PATIENT PROGR, NEUROSTIM: HCPCS | Performed by: NEUROLOGICAL SURGERY

## 2019-05-14 PROCEDURE — 6360000002 HC RX W HCPCS: Performed by: ANESTHESIOLOGY

## 2019-05-14 PROCEDURE — 85610 PROTHROMBIN TIME: CPT

## 2019-05-14 PROCEDURE — 63685 INS/RPLC SPI NPG/RCVR POCKET: CPT | Performed by: NEUROLOGICAL SURGERY

## 2019-05-14 PROCEDURE — 6360000002 HC RX W HCPCS: Performed by: ANESTHESIOLOGIST ASSISTANT

## 2019-05-14 PROCEDURE — 6360000002 HC RX W HCPCS

## 2019-05-14 PROCEDURE — 2720000010 HC SURG SUPPLY STERILE: Performed by: NEUROLOGICAL SURGERY

## 2019-05-14 PROCEDURE — 2580000003 HC RX 258: Performed by: ANESTHESIOLOGIST ASSISTANT

## 2019-05-14 PROCEDURE — 3600000013 HC SURGERY LEVEL 3 ADDTL 15MIN: Performed by: NEUROLOGICAL SURGERY

## 2019-05-14 PROCEDURE — 7100000010 HC PHASE II RECOVERY - FIRST 15 MIN: Performed by: NEUROLOGICAL SURGERY

## 2019-05-14 PROCEDURE — 36415 COLL VENOUS BLD VENIPUNCTURE: CPT

## 2019-05-14 PROCEDURE — 3600000003 HC SURGERY LEVEL 3 BASE: Performed by: NEUROLOGICAL SURGERY

## 2019-05-14 PROCEDURE — 3700000000 HC ANESTHESIA ATTENDED CARE: Performed by: NEUROLOGICAL SURGERY

## 2019-05-14 PROCEDURE — 7100000011 HC PHASE II RECOVERY - ADDTL 15 MIN: Performed by: NEUROLOGICAL SURGERY

## 2019-05-14 PROCEDURE — 6360000002 HC RX W HCPCS: Performed by: PHYSICIAN ASSISTANT

## 2019-05-14 PROCEDURE — 6370000000 HC RX 637 (ALT 250 FOR IP): Performed by: NEUROLOGICAL SURGERY

## 2019-05-14 PROCEDURE — 2500000003 HC RX 250 WO HCPCS: Performed by: ANESTHESIOLOGIST ASSISTANT

## 2019-05-14 PROCEDURE — 2500000003 HC RX 250 WO HCPCS: Performed by: NEUROLOGICAL SURGERY

## 2019-05-14 PROCEDURE — 2580000003 HC RX 258: Performed by: NEUROLOGICAL SURGERY

## 2019-05-14 PROCEDURE — 7100000000 HC PACU RECOVERY - FIRST 15 MIN: Performed by: NEUROLOGICAL SURGERY

## 2019-05-14 PROCEDURE — 2709999900 HC NON-CHARGEABLE SUPPLY: Performed by: NEUROLOGICAL SURGERY

## 2019-05-14 DEVICE — IMPLANTABLE PULSE GENERATOR KIT
Type: IMPLANTABLE DEVICE | Site: BACK | Status: FUNCTIONAL
Brand: SPECTRA WAVEWRITER™

## 2019-05-14 RX ORDER — SODIUM CHLORIDE 9 MG/ML
INJECTION, SOLUTION INTRAVENOUS CONTINUOUS
Status: DISCONTINUED | OUTPATIENT
Start: 2019-05-14 | End: 2019-05-14 | Stop reason: HOSPADM

## 2019-05-14 RX ORDER — DIAPER,BRIEF,INFANT-TODD,DISP
EACH MISCELLANEOUS PRN
Status: DISCONTINUED | OUTPATIENT
Start: 2019-05-14 | End: 2019-05-14 | Stop reason: ALTCHOICE

## 2019-05-14 RX ORDER — ROCURONIUM BROMIDE 10 MG/ML
INJECTION, SOLUTION INTRAVENOUS PRN
Status: DISCONTINUED | OUTPATIENT
Start: 2019-05-14 | End: 2019-05-14 | Stop reason: SDUPTHER

## 2019-05-14 RX ORDER — MEPERIDINE HYDROCHLORIDE 50 MG/ML
INJECTION INTRAMUSCULAR; INTRAVENOUS; SUBCUTANEOUS
Status: COMPLETED
Start: 2019-05-14 | End: 2019-05-14

## 2019-05-14 RX ORDER — MEPERIDINE HYDROCHLORIDE 50 MG/ML
12.5 INJECTION INTRAMUSCULAR; INTRAVENOUS; SUBCUTANEOUS EVERY 5 MIN PRN
Status: DISCONTINUED | OUTPATIENT
Start: 2019-05-14 | End: 2019-05-14 | Stop reason: HOSPADM

## 2019-05-14 RX ORDER — MIDAZOLAM HYDROCHLORIDE 1 MG/ML
INJECTION INTRAMUSCULAR; INTRAVENOUS PRN
Status: DISCONTINUED | OUTPATIENT
Start: 2019-05-14 | End: 2019-05-14 | Stop reason: SDUPTHER

## 2019-05-14 RX ORDER — LIDOCAINE HYDROCHLORIDE 20 MG/ML
INJECTION, SOLUTION INTRAVENOUS PRN
Status: DISCONTINUED | OUTPATIENT
Start: 2019-05-14 | End: 2019-05-14 | Stop reason: SDUPTHER

## 2019-05-14 RX ORDER — SODIUM CHLORIDE 0.9 % (FLUSH) 0.9 %
10 SYRINGE (ML) INJECTION EVERY 12 HOURS SCHEDULED
Status: DISCONTINUED | OUTPATIENT
Start: 2019-05-14 | End: 2019-05-14 | Stop reason: HOSPADM

## 2019-05-14 RX ORDER — LIDOCAINE HYDROCHLORIDE AND EPINEPHRINE 5; 5 MG/ML; UG/ML
INJECTION, SOLUTION INFILTRATION; PERINEURAL PRN
Status: DISCONTINUED | OUTPATIENT
Start: 2019-05-14 | End: 2019-05-14 | Stop reason: ALTCHOICE

## 2019-05-14 RX ORDER — SODIUM CHLORIDE 9 MG/ML
INJECTION, SOLUTION INTRAVENOUS CONTINUOUS PRN
Status: DISCONTINUED | OUTPATIENT
Start: 2019-05-14 | End: 2019-05-14 | Stop reason: SDUPTHER

## 2019-05-14 RX ORDER — FENTANYL CITRATE 50 UG/ML
INJECTION, SOLUTION INTRAMUSCULAR; INTRAVENOUS PRN
Status: DISCONTINUED | OUTPATIENT
Start: 2019-05-14 | End: 2019-05-14 | Stop reason: SDUPTHER

## 2019-05-14 RX ORDER — ONDANSETRON 2 MG/ML
INJECTION INTRAMUSCULAR; INTRAVENOUS PRN
Status: DISCONTINUED | OUTPATIENT
Start: 2019-05-14 | End: 2019-05-14 | Stop reason: SDUPTHER

## 2019-05-14 RX ORDER — PROPOFOL 10 MG/ML
INJECTION, EMULSION INTRAVENOUS PRN
Status: DISCONTINUED | OUTPATIENT
Start: 2019-05-14 | End: 2019-05-14 | Stop reason: SDUPTHER

## 2019-05-14 RX ORDER — CYCLOBENZAPRINE HCL 10 MG
10 TABLET ORAL 3 TIMES DAILY PRN
Qty: 21 TABLET | Refills: 0 | Status: SHIPPED | OUTPATIENT
Start: 2019-05-14 | End: 2019-05-21

## 2019-05-14 RX ORDER — DEXAMETHASONE SODIUM PHOSPHATE 10 MG/ML
INJECTION INTRAMUSCULAR; INTRAVENOUS PRN
Status: DISCONTINUED | OUTPATIENT
Start: 2019-05-14 | End: 2019-05-14 | Stop reason: SDUPTHER

## 2019-05-14 RX ORDER — CEPHALEXIN 500 MG/1
500 CAPSULE ORAL 2 TIMES DAILY
Qty: 14 CAPSULE | Refills: 0 | Status: SHIPPED | OUTPATIENT
Start: 2019-05-14 | End: 2019-05-21

## 2019-05-14 RX ORDER — SODIUM CHLORIDE 0.9 % (FLUSH) 0.9 %
10 SYRINGE (ML) INJECTION PRN
Status: DISCONTINUED | OUTPATIENT
Start: 2019-05-14 | End: 2019-05-14 | Stop reason: HOSPADM

## 2019-05-14 RX ORDER — FENTANYL CITRATE 50 UG/ML
INJECTION, SOLUTION INTRAMUSCULAR; INTRAVENOUS PRN
Status: DISCONTINUED | OUTPATIENT
Start: 2019-05-14 | End: 2019-05-14

## 2019-05-14 RX ORDER — OXYCODONE HYDROCHLORIDE AND ACETAMINOPHEN 5; 325 MG/1; MG/1
1 TABLET ORAL EVERY 6 HOURS PRN
Qty: 28 TABLET | Refills: 0 | Status: SHIPPED | OUTPATIENT
Start: 2019-05-14 | End: 2019-05-21 | Stop reason: ALTCHOICE

## 2019-05-14 RX ORDER — VANCOMYCIN HYDROCHLORIDE 1 G/20ML
INJECTION, POWDER, LYOPHILIZED, FOR SOLUTION INTRAVENOUS PRN
Status: DISCONTINUED | OUTPATIENT
Start: 2019-05-14 | End: 2019-05-14 | Stop reason: SDUPTHER

## 2019-05-14 RX ADMIN — SODIUM CHLORIDE: 9 INJECTION, SOLUTION INTRAVENOUS at 11:43

## 2019-05-14 RX ADMIN — VANCOMYCIN HYDROCHLORIDE 1000 MG: 1 INJECTION, POWDER, LYOPHILIZED, FOR SOLUTION INTRAVENOUS at 10:48

## 2019-05-14 RX ADMIN — MEPERIDINE HYDROCHLORIDE 12.5 MG: 50 INJECTION, SOLUTION INTRAMUSCULAR; INTRAVENOUS; SUBCUTANEOUS at 13:32

## 2019-05-14 RX ADMIN — PROPOFOL 150 MG: 10 INJECTION, EMULSION INTRAVENOUS at 11:45

## 2019-05-14 RX ADMIN — LIDOCAINE HYDROCHLORIDE 80 MG: 20 INJECTION, SOLUTION INTRAVENOUS at 11:45

## 2019-05-14 RX ADMIN — FENTANYL CITRATE 50 MCG: 50 INJECTION, SOLUTION INTRAMUSCULAR; INTRAVENOUS at 12:13

## 2019-05-14 RX ADMIN — FENTANYL CITRATE 50 MCG: 50 INJECTION, SOLUTION INTRAMUSCULAR; INTRAVENOUS at 11:45

## 2019-05-14 RX ADMIN — MEPERIDINE HYDROCHLORIDE 12.5 MG: 50 INJECTION, SOLUTION INTRAMUSCULAR; INTRAVENOUS; SUBCUTANEOUS at 13:19

## 2019-05-14 RX ADMIN — MIDAZOLAM HYDROCHLORIDE 2 MG: 1 INJECTION, SOLUTION INTRAMUSCULAR; INTRAVENOUS at 11:39

## 2019-05-14 RX ADMIN — ROCURONIUM BROMIDE 40 MG: 10 INJECTION, SOLUTION INTRAVENOUS at 11:46

## 2019-05-14 RX ADMIN — VANCOMYCIN HYDROCHLORIDE 1000 MG: 1 INJECTION, POWDER, LYOPHILIZED, FOR SOLUTION INTRAVENOUS at 11:43

## 2019-05-14 RX ADMIN — DEXAMETHASONE SODIUM PHOSPHATE 10 MG: 10 INJECTION INTRAMUSCULAR; INTRAVENOUS at 11:46

## 2019-05-14 RX ADMIN — PHENYLEPHRINE HYDROCHLORIDE 100 MCG: 10 INJECTION INTRAVENOUS at 12:23

## 2019-05-14 RX ADMIN — ONDANSETRON HYDROCHLORIDE 4 MG: 2 INJECTION, SOLUTION INTRAMUSCULAR; INTRAVENOUS at 12:30

## 2019-05-14 ASSESSMENT — PAIN SCALES - GENERAL
PAINLEVEL_OUTOF10: 0

## 2019-05-14 ASSESSMENT — PULMONARY FUNCTION TESTS
PIF_VALUE: 18
PIF_VALUE: 24
PIF_VALUE: 18
PIF_VALUE: 21
PIF_VALUE: 17
PIF_VALUE: 6
PIF_VALUE: 24
PIF_VALUE: 21
PIF_VALUE: 17
PIF_VALUE: 9
PIF_VALUE: 17
PIF_VALUE: 17
PIF_VALUE: 18
PIF_VALUE: 18
PIF_VALUE: 0
PIF_VALUE: 19
PIF_VALUE: 20
PIF_VALUE: 16
PIF_VALUE: 18
PIF_VALUE: 23
PIF_VALUE: 8
PIF_VALUE: 8
PIF_VALUE: 24
PIF_VALUE: 2
PIF_VALUE: 23
PIF_VALUE: 0
PIF_VALUE: 19
PIF_VALUE: 21
PIF_VALUE: 17
PIF_VALUE: 17
PIF_VALUE: 19
PIF_VALUE: 22
PIF_VALUE: 24
PIF_VALUE: 17
PIF_VALUE: 22
PIF_VALUE: 1
PIF_VALUE: 21
PIF_VALUE: 21
PIF_VALUE: 23
PIF_VALUE: 9
PIF_VALUE: 16
PIF_VALUE: 18
PIF_VALUE: 20
PIF_VALUE: 18
PIF_VALUE: 22
PIF_VALUE: 19
PIF_VALUE: 10
PIF_VALUE: 18
PIF_VALUE: 3
PIF_VALUE: 0
PIF_VALUE: 17
PIF_VALUE: 21
PIF_VALUE: 18
PIF_VALUE: 24
PIF_VALUE: 21
PIF_VALUE: 8
PIF_VALUE: 18
PIF_VALUE: 17
PIF_VALUE: 19
PIF_VALUE: 0
PIF_VALUE: 8
PIF_VALUE: 0
PIF_VALUE: 17
PIF_VALUE: 21
PIF_VALUE: 11
PIF_VALUE: 20
PIF_VALUE: 1
PIF_VALUE: 18
PIF_VALUE: 20
PIF_VALUE: 3
PIF_VALUE: 17
PIF_VALUE: 8
PIF_VALUE: 0
PIF_VALUE: 2
PIF_VALUE: 20

## 2019-05-14 ASSESSMENT — PAIN - FUNCTIONAL ASSESSMENT
PAIN_FUNCTIONAL_ASSESSMENT: PREVENTS OR INTERFERES SOME ACTIVE ACTIVITIES AND ADLS
PAIN_FUNCTIONAL_ASSESSMENT: 0-10

## 2019-05-14 ASSESSMENT — PAIN DESCRIPTION - DESCRIPTORS: DESCRIPTORS: ACHING;BURNING

## 2019-05-14 NOTE — BRIEF OP NOTE
Brief Postoperative Note  ______________________________________________________________    Patient: Wing Dc  YOB: 1940  MRN: [id number]  Date of Procedure: 5/14/2019    Pre-Op Diagnosis: FAILED BACK SYNDROME    Post-Op Diagnosis: Same       Procedure(s):  PLACEMENT OF  PERMANENT SPINAL CORD STIMULATOR---AARON, KODAK JAIME, BOSTON [employer]    Anesthesia: General    Surgeon(s):  Michela Joseph MD    Assistant: none    Estimated Blood Loss (mL): less than 50     Complications: None    Specimens:   * No specimens in log *    Implants:  Implant Name Type Inv.  Item Serial No.  Lot No. LRB No. Used   KIT IPG [employer] - [id number] Spine:Stimulator KIT IPG SPECTRA [employer] [id number] [place] [employer]: INTERVENTIONAL CARDIO [id number] Left 1         Drains: * No LDAs found *    Findings: see dictated op note    Jayna Weiss MD  Date: 5/14/2019  Time: 5:17 PM

## 2019-05-14 NOTE — ANESTHESIA POSTPROCEDURE EVALUATION
Department of Anesthesiology  Postprocedure Note    Patient: Shannan Niño  MRN: [id number]  YOB: 1940  Date of evaluation: 5/14/2019  Time:  1:27 PM     Procedure Summary     Date:  05/14/19 Room / Location:  [facility] OR 07 / [facility]    Anesthesia Start:  1139 Anesthesia Stop:  8019    Procedure:  PLACEMENT OF  PERMANENT SPINAL CORD STIMULATOR---XRAY, KODAK TABLE, BOSTON [employer]ENT[employer] (N/A ) Diagnosis:  (FAILED BACK SYNDROME)    Surgeon:  Zander Najera MD Responsible Provider:  Sigrid Griffin MD    Anesthesia Type:  general ASA Status:  3          Anesthesia Type: general    Anthony Phase I: Anthony Score: 9    Anthony Phase II:      Last vitals: Reviewed and per EMR flowsheets.        Anesthesia Post Evaluation    Patient location during evaluation: PACU  Patient participation: complete - patient participated  Level of consciousness: awake and alert  Pain score: 2  Airway patency: patent  Nausea & Vomiting: no vomiting and no nausea  Complications: no  Cardiovascular status: hemodynamically stable  Respiratory status: spontaneous ventilation  Hydration status: stable

## 2019-05-14 NOTE — PROGRESS NOTES
CLINICAL PHARMACY NOTE: MEDS TO [address] Select Patient?: Yes  Total # of Prescriptions Filled: 3   The following medications were delivered to the patient:  · Percocet 5-325  · Flexeril 10  · Cephalexin 500  Total # of Interventions Completed: 3  Time Spent (min): 15    Additional Documentation:

## 2019-05-14 NOTE — ANESTHESIA PRE PROCEDURE
Department of Anesthesiology  Preprocedure Note       Name:  Rosie Clemons   Age:  66 y.o.  :  1940                                          MRN:  [id number]         Date:  2019      Surgeon: Jaspreet Pace):  Eryn Drew MD    Procedure: PLACEMENT OF  PERMANENT SPINAL CORD STIMULATOR---XRAY, KODAK TABLE, [employer] (N/A )    Medications prior to admission:   Prior to Admission medications    Medication Sig Start Date End Date Taking? Authorizing Provider   hydrochlorothiazide (HYDRODIURIL) 25 MG tablet Take 1 tablet by mouth daily 19   Uriel Colunga DO   losartan (COZAAR) 100 MG tablet Take 100 mg by mouth daily    Historical Provider, MD   pantoprazole sodium (PROTONIX) 40 MG PACK packet Take 40 mg by mouth every morning (before breakfast)    Historical Provider, MD   simvastatin (ZOCOR) 10 MG tablet Take 10 mg by mouth nightly    Historical Provider, MD   meloxicam (MOBIC) 15 MG tablet Take 15 mg by mouth daily    Historical Provider, MD   carvedilol (COREG) 12.5 MG tablet Take 12.5 mg by mouth 2 times daily (with meals)    Historical Provider, MD   citalopram (CELEXA) 10 MG tablet Take 10 mg by mouth daily    Historical Provider, MD   gabapentin (NEURONTIN) 300 MG capsule Take 300 mg by mouth 4 times daily. Kaya Viera Historical Provider, MD   vitamin B-12 (CYANOCOBALAMIN) 1000 MCG tablet Take 1,000 mcg by mouth daily    Historical Provider, MD   vitamin D (D3-1000) 1000 units CAPS Take by mouth daily    Historical Provider, MD       Current medications:    No current facility-administered medications for this visit. No current outpatient medications on file.      Facility-Administered Medications Ordered in Other Visits   Medication Dose Route Frequency Provider Last Rate Last Dose    0.9 % sodium chloride infusion   Intravenous Continuous SEBASTIÁN Cabrera        sodium chloride flush 0.9 % injection 10 mL  10 mL Intravenous 2 times per day SEBASTIÁN Cabrera        sodium chloride flush 0.9 % injection 10 mL  10 mL Intravenous PRN SEBASTIÁN Cabrera        vancomycin 1000 mg IVPB in 250 mL D5W addavial  1,000 mg Intravenous On Call to SEBASTIÁN Lee           Allergies: Allergies   Allergen Reactions    Pcn [Penicillins] Rash       Problem List:    Patient Active Problem List   Diagnosis Code    TIA (transient ischemic attack) G45.9    Hyperlipidemia E78.5    Chronic pain syndrome G89.4    Chronic bilateral low back pain with right-sided sciatica M54.41, G89.29    Lumbar radiculopathy M54.16    Lumbar post-laminectomy syndrome M96.1       Past Medical History:        Diagnosis Date    Hyperlipidemia     Hypertension     TIA (transient ischemic attack)        Past Surgical History:        Procedure Laterality Date    BACK SURGERY      x3    CHOLECYSTECTOMY      HERNIA REPAIR  2018    HYSTERECTOMY      SPINAL CORD STIMULATOR IMPLANTATION N/A 5/6/2019    T8 SPINAL CORD STIMULATOR  TRIAL PLACEMENT--SUNITHA, KODAK [facility] [employer] performed by Eryn Drew MD at [facility] OR       Social History:    Social History     Tobacco Use    Smoking status: Never Smoker    Smokeless tobacco: Never Used   Substance Use Topics    Alcohol use: Yes     Comment: occ                                Counseling given: Not Answered      Vital Signs (Current): There were no vitals filed for this visit.                                            BP Readings from Last 3 Encounters:   05/14/19 (!) 144/74   05/09/19 (!) 163/102   05/06/19 125/78       NPO Status:                                                                                 BMI:   Wt Readings from Last 3 Encounters:   05/14/19 139 lb (63 kg)   05/09/19 138 lb (62.6 kg)   05/06/19 139 lb (63 kg)     There is no height or weight on file to calculate BMI.    CBC:   Lab Results   Component Value Date    WBC 6.2 09/11/2012    RBC 3.73 09/11/2012    HGB 12.4 09/11/2012    HCT 36.3 09/11/2012    MCV 97.3 09/11/2012 RDW 12.1 09/11/2012     09/11/2012       CMP:   Lab Results   Component Value Date     04/30/2019    K 4.3 04/30/2019    CL 94 04/30/2019    CO2 34 04/30/2019    BUN 11 04/30/2019    CREATININE 0.7 04/30/2019    GFRAA >60 04/30/2019    LABGLOM >60 04/30/2019    GLUCOSE 136 04/30/2019    GLUCOSE 111 01/25/2011    PROT 6.8 09/10/2012    CALCIUM 9.4 04/30/2019    BILITOT 0.4 09/10/2012    ALKPHOS 73 09/10/2012    AST 19 09/10/2012    ALT 17 09/10/2012       POC Tests: No results for input(s): POCGLU, POCNA, POCK, POCCL, POCBUN, POCHEMO, POCHCT in the last 72 hours.     Coags:   Lab Results   Component Value Date    PROTIME 11.7 04/30/2019    PROTIME 9.8 01/25/2011    INR 1.0 04/30/2019    APTT 25.5 09/09/2012       HCG (If Applicable): No results found for: PREGTESTUR, PREGSERUM, HCG, HCGQUANT     ABGs: No results found for: PHART, PO2ART, HOS9RSY, JGM2LRP, BEART, C2TNPVRZ     Type & Screen (If Applicable):  No results found for: LABABO, LABRH     EKG 4/30/19  Sinus bradycardia (58 bpm)  Incomplete right bundle branch block  Otherwise normal ECG  No previous ECGs available  Confirmed by Jose Ramon Marc ([id number]) on 4/30/2019 9:44:29 AM    Anesthesia Evaluation  Patient summary reviewed and Nursing notes reviewed no history of anesthetic complications:   Airway: Mallampati: II  TM distance: >3 FB   Neck ROM: full  Mouth opening: > = 3 FB Dental:    (+) upper dentures      Pulmonary: breath sounds clear to auscultation                             Cardiovascular:  Exercise tolerance: good (>4 METS),   (+) hypertension:, hyperlipidemia      ECG reviewed  Rhythm: regular  Rate: normal           Beta Blocker:  Dose within 24 Hrs         Neuro/Psych:   (+) neuromuscular disease (lumbar radiculopathy, chronic low back pain, sciatic (right side)):, TIA,              ROS comment: Left Leg is weaker than right, but pain is more on right leg than left leg  GI/Hepatic/Renal:   (+) GERD: well controlled,

## 2019-05-14 NOTE — H&P
I have examined the patient and reviewed the H and P and no changes are noted    Gissel Mccarty
discussed and she wishes to proceed with surgery.

## 2019-05-15 NOTE — OP NOTE
[address]                  [address] [facility]  [facility]                                OPERATIVE REPORT    PATIENT NAME: Edwin Dobbs                   :        1940  MED REC NO:   [id number]                            ROOM:  ACCOUNT NO:   [de-identified]                           ADMIT DATE: 2019  PROVIDER:     Twin Ambrocio MD    DATE OF PROCEDURE:  2019    PREOPERATIVE DIAGNOSES:  1. Chronic back pain. 2.  Failed back surgery syndrome. POSTOPERATIVE DIAGNOSES:  1. Chronic back pain. 2.  Failed back surgery syndrome. OPERATIONS PERFORMED:  1. Conversion of open T8 [employer] spinal cord stimulator trial  to permanent T8 spina cord stimulator with placement of left gluteal  implantable pulse generator. 2.  Complex programming of spinal cord stimulator. ANESTHESIA:  Generalized endotracheal anesthesia. SURGEON:  Twin Ambrocio MD    ASSISTANT:  None. COMPLICATIONS:  None. ESTIMATED BLOOD LOSS:  50 mL. SPECIMEN:  None. OPERATIVE INDICATIONS:  The patient is a 80-year-old lady who underwent  a previous open T8 [employer] spinal cord stimulator trial  approximately a week ago. Of note, she states that she had gotten at  least 70% improvement in her pain after the open spinal cord stimulator  trial was performed and after risks, benefits, and alternatives were  discussed with the patient, it was determined that she would undergo the  above-listed procedure. OPERATIVE PROCEDURE:  The patient was brought into the operating room. A timeout was performed where she was identified by her name, medical  record number, and the operative procedure which she was about to  undergo. Next, induction of generalized endotracheal anesthesia was  then commenced. Upon completion of induction of generalized  endotracheal anesthesia, she received preoperative antibiotics.   She was  then flipped into prone position on a Jono table. All pressure  points were padded. Previous thoracic incision was then prepped and  draped in the usual sterile fashion and I also proceeded to then prep  out the leads and after this was done, I also proceeded to then laura out  approximately a 4 cm incision in the left gluteal region. I proceeded  to then infiltrate both incisions with lidocaine with epinephrine  1:200,000 and after I prepped and draped all the incisions in the usual  sterile fashion, I proceeded to then use a #10 blade to open up the left  gluteal incision. Monopolar cautery was used to dissect through the  subcutaneous tissue. I then used blunt finger dissection to create a  pocket for the implantable pulse generator. Next, I proceeded to then use mosquitoes to remove the staples over the  previous thoracic incision. Metzenbaum scissors were then used to  remove the sutures that had been previously placed. I proceeded to then  identify the leads that had been placed into extenders. I then used the  torque screwdriver to then disconnect the leads from the extenders. Next, the extenders were then pulled out from underneath the drapes and  after this was done, I proceeded to then tunnel from the thoracic  incision to the gluteal incision. I then fed the leads from the paddle  electrode into the gluteal incision. I proceeded to then insert the  leads into the implantable pulse generator. After this was done, I then subsequently proceeded to place the  implantable pulse generator into the pocket. Impedances were checked. I then irrigated both incisions copiously with antibiotic-impregnated  saline. I closed the thoracic incision in layers using 0 Vicryl for the  fascia, 2-0 Vicryl for the subcutaneous layer, and staples for the skin. I closed the left gluteal incision in layers using 2-0 Vicryl for the  subcutaneous layer and staples for the skin. Dry sterile dressing was  placed over both incisions.

## 2019-05-17 NOTE — PROGRESS NOTES
PLACEMENT--SUNITHA, Rock My [place], Stat performed by Michela Joseph MD at [address] [place] [address] N/A 5/14/2019    PLACEMENT OF  PERMANENT SPINAL CORD STIMULATOR---AARON, Rock My [place], Stat performed by Michela Joseph MD at [facility]       Prior to Admission medications    Medication Sig Start Date End Date Taking? Authorizing Provider   oxyCODONE-acetaminophen (PERCOCET) 5-325 MG per tablet Take 1 tablet by mouth every 6 hours as needed for Pain for up to 7 days. Intended supply: 3 days. Take lowest dose possible to manage pain 5/14/19 5/21/19 Yes SEBASTIÁN Gaines   cyclobenzaprine (FLEXERIL) 10 MG tablet Take 1 tablet by mouth 3 times daily as needed for Muscle spasms 5/14/19 5/21/19 Yes SEBASTIÁN Gaines   cephALEXin (KEFLEX) 500 MG capsule Take 1 capsule by mouth 2 times daily for 7 days 5/14/19 5/21/19 Yes SEBASTIÁN Gaines   hydrochlorothiazide (HYDRODIURIL) 25 MG tablet Take 1 tablet by mouth daily 5/13/19  Yes Uriel Colunga DO   losartan (COZAAR) 100 MG tablet Take 100 mg by mouth daily   Yes Historical Provider, MD   pantoprazole sodium (PROTONIX) 40 MG PACK packet Take 40 mg by mouth every morning (before breakfast)   Yes Historical Provider, MD   simvastatin (ZOCOR) 10 MG tablet Take 10 mg by mouth nightly   Yes Historical Provider, MD   meloxicam (MOBIC) 15 MG tablet Take 15 mg by mouth daily   Yes Historical Provider, MD   carvedilol (COREG) 12.5 MG tablet Take 12.5 mg by mouth 2 times daily (with meals)   Yes Historical Provider, MD   citalopram (CELEXA) 10 MG tablet Take 10 mg by mouth daily   Yes Historical Provider, MD   gabapentin (NEURONTIN) 300 MG capsule Take 300 mg by mouth 4 times daily. .   Yes Historical Provider, MD   vitamin B-12 (CYANOCOBALAMIN) 1000 MCG tablet Take 1,000 mcg by mouth daily   Yes Historical Provider, MD   vitamin D (D3-1000) 1000 units CAPS Take by mouth daily   Yes Historical Provider, MD       Allergies   Allergen Reactions    Pcn [Penicillins] Rash       Social History     Socioeconomic History    Marital status: Single     Spouse name: Not on file    Number of children: Not on file    Years of education: Not on file    Highest education level: Not on file   Occupational History    Not on file   Social Needs    Financial resource strain: Not on file    Food insecurity:     Worry: Not on file     Inability: Not on file    Transportation needs:     Medical: Not on file     Non-medical: Not on file   Tobacco Use    Smoking status: Never Smoker    Smokeless tobacco: Never Used   Substance and Sexual Activity    Alcohol use: Yes     Comment: occ    Drug use: No    Sexual activity: Not on file   Lifestyle    Physical activity:     Days per week: Not on file     Minutes per session: Not on file    Stress: Not on file   Relationships    Social connections:     Talks on phone: Not on file     Gets together: Not on file     Attends Anabaptist service: Not on file     Active member of club or organization: Not on file     Attends meetings of clubs or organizations: Not on file     Relationship status: Not on file    Intimate partner violence:     Fear of current or ex partner: Not on file     Emotionally abused: Not on file     Physically abused: Not on file     Forced sexual activity: Not on file   Other Topics Concern    Not on file   Social History Narrative    Not on file       History reviewed. No pertinent family history. REVIEW OF SYSTEMS:     Junior Pierre denies fever/chills, chest pain, shortness of breath, new bowel or bladder complaints. All other review of systems was negative.     PHYSICAL EXAMINATION:      /74   Pulse 84   Temp 98.6 °F (37 °C)   Resp 18   Ht 5' 3\" (1.6 m)   Wt 138 lb (62.6 kg)   BMI 24.45 kg/m²     General:       General appearance:pleasant, well-hydrated, in no distress and A & O x3  Build:Normal Weight  Function:Moves about room easily     HEENT:     Head:normocephalic, atraumatic  Pupils:regular, round, equal  Sclera: icterus absent     Lungs:     Breathing:normal breathing pattern     Abdomen:     Shape:non-distended and normal  Tenderness:none  Guarding:none     Lumbar spine:     Spine inspection:surgical scar, decreased lordosis, well-healing midline surgical incision and left gluteal incision, staples in place and no erythema or discharge, + ecchymosis around each incision as well as edema around generator pocket  CVA tenderness:No   Palpation:tenderness paravertebral muscles, left, right and positive.   Range of motion:abnormal significantly in lateral bending, flexion, extension rotation bilateral and is painful.     Musculoskeletal:     Trigger points in Paraveteral:absent bilaterally  SI joint tenderness:negative right, negative left              TASH test:not done right, not done             left  Piriformis tenderness:positive right, positive left  Trochanteric bursa tenderness:positive right, negative left  SLR:positive right, negative left, sitting      Extremities:     Tremors:None bilaterally upper and lower  Intact:Yes  Varicose veins:absent   Pulses:present Lt radial  Cyanosis:none  Edema:none x all 4 extremities     Neurological:     Sensory:normal to light touch BLE     Motor:                Right Quadriceps5/5          Left Quadriceps4/5           Right Gastrocnemius5/5    Left Gastrocnemius4/5  Right Ant Tibialis5/5  Left Ant Tibialis5/5  Gait:antalgic     Dermatology:     Skin:no rashes or lesions noted (see lumbar)     Assessment/Plan:  LBP and RLE pain in L5 distribution with diffuse weakness of the LLE  Multiple prior lumbar fusion surgeries with Dr. Michael Martell, most recent 9/2016 extending her fusion from T11 (now noted on updated imaging to be T10) to sacrum (L1-L5 fusion from what I can tell from [facility] records)  On PLAVIX and ASA for Hx TIA's/CVA's  Has failed multiple surgeries, injections, PT, and medications  In review of the below ordered imaging studies, the patient is actually fused from T10-S1 (originally though T11-sacrum) which means she is not a candidate for a percutaneous SCS trial and needs a buried trial with a surgeon. Referral was placed to Dr. Venice Rain for this.     Has staged trial with Dr. Venice Rain 5/6/2019 and 5/14/2019, has had some improvement in her chronic pain thus far but does need a reprogramming as she is not getting stimulation below the knee. She is currently taking percocet 2-3x per day  Her  states he notices a significant improvement in her pain control with the SCS implant  Mac Elms is here from [address] to do reprogramming after her OV    D/c'ed meloxicam due to GI upset and unknown relief of meloxicam, GI upset is improved  Restart Norco 5/325 TID #90, plan to wean off over the next 1-2 visits  Continue gabapentin as currently prescribed from outside provider, encouraged to continue this medication at this time  Patient encouraged to stay active  Treatment plan discussed with the patient including medication and procedure side effects     Cc:  Referring physician    MALORIE Navarro.

## 2019-05-21 ENCOUNTER — OFFICE VISIT (OUTPATIENT)
Dept: PAIN MANAGEMENT | Age: 79
End: 2019-05-21
Payer: MEDICARE

## 2019-05-21 VITALS
RESPIRATION RATE: 18 BRPM | DIASTOLIC BLOOD PRESSURE: 74 MMHG | HEART RATE: 84 BPM | HEIGHT: 63 IN | TEMPERATURE: 98.6 F | SYSTOLIC BLOOD PRESSURE: 130 MMHG | BODY MASS INDEX: 24.45 KG/M2 | WEIGHT: 138 LBS

## 2019-05-21 DIAGNOSIS — G89.29 CHRONIC BILATERAL LOW BACK PAIN WITH RIGHT-SIDED SCIATICA: Primary | ICD-10-CM

## 2019-05-21 DIAGNOSIS — M96.1 LUMBAR POST-LAMINECTOMY SYNDROME: ICD-10-CM

## 2019-05-21 DIAGNOSIS — M54.16 LUMBAR RADICULOPATHY: ICD-10-CM

## 2019-05-21 DIAGNOSIS — G89.4 CHRONIC PAIN SYNDROME: ICD-10-CM

## 2019-05-21 DIAGNOSIS — M54.41 CHRONIC BILATERAL LOW BACK PAIN WITH RIGHT-SIDED SCIATICA: Primary | ICD-10-CM

## 2019-05-21 PROCEDURE — 99213 OFFICE O/P EST LOW 20 MIN: CPT | Performed by: PAIN MEDICINE

## 2019-05-21 RX ORDER — HYDROCODONE BITARTRATE AND ACETAMINOPHEN 5; 325 MG/1; MG/1
1 TABLET ORAL 3 TIMES DAILY PRN
Qty: 90 TABLET | Refills: 0 | Status: SHIPPED | OUTPATIENT
Start: 2019-05-21 | End: 2019-06-18 | Stop reason: SDUPTHER

## 2019-05-30 ENCOUNTER — OFFICE VISIT (OUTPATIENT)
Dept: NEUROSURGERY | Age: 79
End: 2019-05-30

## 2019-05-30 VITALS
HEIGHT: 63 IN | HEART RATE: 72 BPM | SYSTOLIC BLOOD PRESSURE: 138 MMHG | DIASTOLIC BLOOD PRESSURE: 82 MMHG | WEIGHT: 143 LBS | BODY MASS INDEX: 25.34 KG/M2

## 2019-05-30 DIAGNOSIS — M54.40 ACUTE RIGHT-SIDED LOW BACK PAIN WITH SCIATICA, SCIATICA LATERALITY UNSPECIFIED: Primary | ICD-10-CM

## 2019-05-30 PROCEDURE — 99024 POSTOP FOLLOW-UP VISIT: CPT | Performed by: PHYSICIAN ASSISTANT

## 2019-06-07 RX ORDER — CLOPIDOGREL BISULFATE 75 MG/1
75 TABLET ORAL DAILY
Qty: 90 TABLET | Refills: 5 | Status: SHIPPED | OUTPATIENT
Start: 2019-06-07 | End: 2019-06-18

## 2019-06-07 RX ORDER — GABAPENTIN 300 MG/1
CAPSULE ORAL
Qty: 450 CAPSULE | Refills: 5 | Status: SHIPPED | OUTPATIENT
Start: 2019-06-07 | End: 2019-06-18

## 2019-06-07 RX ORDER — CARVEDILOL 12.5 MG/1
12.5 TABLET ORAL DAILY
Qty: 180 TABLET | Refills: 5 | Status: SHIPPED | OUTPATIENT
Start: 2019-06-07 | End: 2019-06-18

## 2019-06-12 NOTE — PROGRESS NOTES
[facility], [address] [place] [address] John[place]  [phone]    Follow up Note      Elieser Allen     Date of Visit:  2019    CC:  Patient presents for follow up   Chief Complaint   Patient presents with    Follow-up     post op SCS     HPI:    Pain is better. Change in quality of symptoms:no. Medication side effects:none. Recent diagnostic testing:none. Recent interventional procedures:none since last visit. She has been on anticoagulation medications to include Plavix and 81 mg ASA and has not been on herbal supplements. She is not diabetic.     Imaging:   Lumbar F/E films 2018 -   Posterior pedicle screw plates extend from Z32 vertebral level to the   sacrum. Bony demineralization. Previous discectomy with loss of disc   space L1-2. Retrolisthesis L1-2 and anterolisthesis L4-5 S1. Multilevel disc space narrowing. No acute osseous finding. No   instability on flexion-extension views. Lumbar MRI 2018 -   Posterior pedicle screw plates extend from D57 vertebral level to the   sacrum. Bony demineralization. Previous discectomy with loss of disc   space L1-2. Retrolisthesis L1-2 and anterolisthesis L4-5 S1. Multilevel disc space narrowing. No acute osseous finding. No   instability on flexion-extension views.      Lumbar MRI 2016 -         Potential Aberrant Drug-Related Behavior: no     Urine Drug Screenin2019 + hydrocodone and tramadol    OARRS report:  2019 consistent  2019 consistent  2019 consistent  2019 - consistent  2019 consistent    Past Medical History:   Diagnosis Date    Hyperlipidemia     Hypertension     TIA (transient ischemic attack)        Past Surgical History:   Procedure Laterality Date    BACK SURGERY      x3    CHOLECYSTECTOMY      HERNIA REPAIR  2018    HYSTERECTOMY      SPINAL CORD STIMULATOR IMPLANTATION N/A 2019    T8 SPINAL CORD STIMULATOR  [facility] by Eryn Drew MD at [address] [place] [address] 5/14/2019    PLACEMENT OF  PERMANENT SPINAL CORD STIMULATOR---XRAY, KODAK TABLE, [facility] SCIENTIFIC performed by Eryn Drew MD at [address]       Prior to Admission medications    Medication Sig Start Date End Date Taking? Authorizing Provider   tiZANidine (ZANAFLEX) 4 MG tablet TAKE 1 TABLET BY MOUTH THREE TIMES DAILY FOR SPASMS 6/1/19  Yes Historical Provider, MD   citalopram (CELEXA) 10 MG tablet Take by mouth 1/22/14  Yes Historical Provider, MD   clopidogrel (PLAVIX) 75 MG tablet Take by mouth 9/25/12  Yes Historical Provider, MD   losartan (COZAAR) 100 MG tablet Take by mouth 6/5/17  Yes Historical Provider, MD   pantoprazole (PROTONIX) 40 MG tablet TAKE 1 TABLET BY MOUTH ONCE DAILY ( TAKE B-12 1000MG AND D3 2000MG FOR GERD) 5/19/19  Yes Historical Provider, MD   simvastatin (ZOCOR) 10 MG tablet Take by mouth 9/11/12  Yes Historical Provider, MD   HYDROcodone-acetaminophen (NORCO) 5-325 MG per tablet Take 1 tablet by mouth 3 times daily as needed for Pain for up to 30 days. Intended supply: 30 days 5/21/19 6/20/19 Yes Urszula Arroyo, DO   hydrochlorothiazide (HYDRODIURIL) 25 MG tablet Take 1 tablet by mouth daily 5/13/19  Yes Uriel Colunga, DO   carvedilol (COREG) 12.5 MG tablet Take 12.5 mg by mouth 2 times daily (with meals)   Yes Historical Provider, MD   gabapentin (NEURONTIN) 300 MG capsule Take 300 mg by mouth 4 times daily. .   Yes Historical Provider, MD   vitamin B-12 (CYANOCOBALAMIN) 1000 MCG tablet Take 1,000 mcg by mouth daily   Yes Historical Provider, MD   vitamin D (D3-1000) 1000 units CAPS Take by mouth daily   Yes Historical Provider, MD       Allergies   Allergen Reactions    Pcn [Penicillins] Rash       Social History     Socioeconomic History    Marital status: Single     Spouse name: Not on file    Number of children: Not on file    Years of education: Not on file    Highest education level: Not on file   Occupational History    Not on file   Social Needs    Financial resource strain: Not on file    Food insecurity:     Worry: Not on file     Inability: Not on file    Transportation needs:     Medical: Not on file     Non-medical: Not on file   Tobacco Use    Smoking status: Never Smoker    Smokeless tobacco: Never Used   Substance and Sexual Activity    Alcohol use: Yes     Comment: occ    Drug use: No    Sexual activity: Not on file   Lifestyle    Physical activity:     Days per week: Not on file     Minutes per session: Not on file    Stress: Not on file   Relationships    Social connections:     Talks on phone: Not on file     Gets together: Not on file     Attends Taoism service: Not on file     Active member of club or organization: Not on file     Attends meetings of clubs or organizations: Not on file     Relationship status: Not on file    Intimate partner violence:     Fear of current or ex partner: Not on file     Emotionally abused: Not on file     Physically abused: Not on file     Forced sexual activity: Not on file   Other Topics Concern    Not on file   Social History Narrative    Not on file       No family history on file. REVIEW OF SYSTEMS:     Alisia Roberts denies fever/chills, chest pain, shortness of breath, new bowel or bladder complaints. All other review of systems was negative.     PHYSICAL EXAMINATION:      /78   Pulse 88   Temp 98.7 °F (37.1 °C) (Oral)   Resp 16   Ht 5' 3\" (1.6 m)   Wt 143 lb (64.9 kg)   SpO2 98%   BMI 25.33 kg/m²     General:       General appearance:pleasant, well-hydrated, in no distress and A & O x3  Build:Normal Weight  Function:Moves about room easily     HEENT:     Head:normocephalic, atraumatic  Pupils:regular, round, equal  Sclera: icterus absent     Lungs:     Breathing:normal breathing pattern     Abdomen:     Shape:non-distended and normal  Tenderness:none  Guarding:none     Lumbar spine:     Spine inspection:surgical, decreased lordosis, well-healed midline surgical incision and left gluteal incision  CVA tenderness:No   Palpation:tenderness paravertebral muscles, left, right negative. Range of motion:abnormal mildly in lateral bending, flexion, extension rotation bilateral and is painful.     Musculoskeletal:     Trigger points in Paraveteral:absent bilaterally  SI joint tenderness:negative right, negative left              TASH test:not done right, not done             left  Piriformis tenderness:negative right, negative left  Trochanteric bursa tenderness:negative right, negative left  SLR:negative right, negative left, sitting      Extremities:     Tremors:None bilaterally upper and lower  Intact:Yes  Varicose veins:absent   Pulses:present Lt radial  Cyanosis:none  Edema:none x all 4 extremities     Neurological:     Sensory:normal to light touch BLE     Motor:                Right Quadriceps5/5          Left Quadriceps4/5           Right Gastrocnemius5/5    Left Gastrocnemius4/5  Right Ant Tibialis5/5  Left Ant Tibialis5/5  Gait:antalgic     Dermatology:     Skin:no rashes or lesions noted (see lumbar)     Assessment/Plan:  LBP and RLE pain in L5 distribution with diffuse weakness of the LLE  Multiple prior lumbar fusion surgeries with Dr. Mei Argueta, most recent 9/2016 extending her fusion from T11 (now noted on updated imaging to be T10) to sacrum (L1-L5 fusion from what I can tell from [facility] records)  On PLAVIX and ASA for Hx TIA's/CVA's  Has failed multiple surgeries, injections, PT, and medications  In review of the below ordered imaging studies, the patient is actually fused from T10-S1 (originally though T11-sacrum)    Has staged trial with Dr. Peña 5/6/2019 and 5/14/2019, has had some improvement in her chronic pain thus far but does need a reprogramming as she is not getting stimulation below the knee.   Had reprogramming again today with Antonietta from [facility] difficulty

## 2019-06-18 ENCOUNTER — OFFICE VISIT (OUTPATIENT)
Dept: PAIN MANAGEMENT | Age: 79
End: 2019-06-18
Payer: MEDICARE

## 2019-06-18 VITALS
HEIGHT: 63 IN | SYSTOLIC BLOOD PRESSURE: 132 MMHG | TEMPERATURE: 98.7 F | OXYGEN SATURATION: 98 % | RESPIRATION RATE: 16 BRPM | WEIGHT: 143 LBS | BODY MASS INDEX: 25.34 KG/M2 | HEART RATE: 88 BPM | DIASTOLIC BLOOD PRESSURE: 78 MMHG

## 2019-06-18 DIAGNOSIS — M54.16 LUMBAR RADICULOPATHY: ICD-10-CM

## 2019-06-18 DIAGNOSIS — G89.4 CHRONIC PAIN SYNDROME: ICD-10-CM

## 2019-06-18 DIAGNOSIS — M96.1 LUMBAR POST-LAMINECTOMY SYNDROME: ICD-10-CM

## 2019-06-18 DIAGNOSIS — M54.41 CHRONIC BILATERAL LOW BACK PAIN WITH RIGHT-SIDED SCIATICA: Primary | ICD-10-CM

## 2019-06-18 DIAGNOSIS — G89.29 CHRONIC BILATERAL LOW BACK PAIN WITH RIGHT-SIDED SCIATICA: Primary | ICD-10-CM

## 2019-06-18 PROCEDURE — 99213 OFFICE O/P EST LOW 20 MIN: CPT | Performed by: PAIN MEDICINE

## 2019-06-18 RX ORDER — SIMVASTATIN 10 MG
TABLET ORAL DAILY
COMMUNITY
Start: 2012-09-11 | End: 2019-10-25 | Stop reason: SDUPTHER

## 2019-06-18 RX ORDER — LOSARTAN POTASSIUM 100 MG/1
TABLET ORAL DAILY
COMMUNITY
Start: 2017-06-05 | End: 2019-10-25 | Stop reason: SDUPTHER

## 2019-06-18 RX ORDER — CITALOPRAM 10 MG/1
10 TABLET ORAL DAILY
COMMUNITY
Start: 2014-01-22 | End: 2019-10-25 | Stop reason: SDUPTHER

## 2019-06-18 RX ORDER — HYDROCHLOROTHIAZIDE 25 MG/1
TABLET ORAL
COMMUNITY
Start: 2017-07-01 | End: 2019-06-18

## 2019-06-18 RX ORDER — CARVEDILOL 12.5 MG/1
TABLET ORAL
COMMUNITY
Start: 2018-06-29 | End: 2019-06-18

## 2019-06-18 RX ORDER — PANTOPRAZOLE SODIUM 40 MG/1
TABLET, DELAYED RELEASE ORAL
Refills: 12 | COMMUNITY
Start: 2019-05-19 | End: 2022-06-03

## 2019-06-18 RX ORDER — GABAPENTIN 300 MG/1
CAPSULE ORAL
COMMUNITY
Start: 2012-06-18 | End: 2019-06-18

## 2019-06-18 RX ORDER — TIZANIDINE 4 MG/1
TABLET ORAL
Refills: 12 | COMMUNITY
Start: 2019-06-01 | End: 2019-08-23

## 2019-06-18 RX ORDER — HYDROCODONE BITARTRATE AND ACETAMINOPHEN 5; 325 MG/1; MG/1
1 TABLET ORAL 2 TIMES DAILY PRN
Qty: 60 TABLET | Refills: 0 | Status: SHIPPED | OUTPATIENT
Start: 2019-06-21 | End: 2019-07-12 | Stop reason: SDUPTHER

## 2019-06-18 RX ORDER — CLOPIDOGREL BISULFATE 75 MG/1
TABLET ORAL
COMMUNITY
Start: 2012-09-25 | End: 2019-10-25 | Stop reason: SDUPTHER

## 2019-07-12 ENCOUNTER — OFFICE VISIT (OUTPATIENT)
Dept: PAIN MANAGEMENT | Age: 79
End: 2019-07-12
Payer: MEDICARE

## 2019-07-12 VITALS
BODY MASS INDEX: 24.8 KG/M2 | HEIGHT: 63 IN | RESPIRATION RATE: 16 BRPM | HEART RATE: 80 BPM | SYSTOLIC BLOOD PRESSURE: 134 MMHG | WEIGHT: 140 LBS | DIASTOLIC BLOOD PRESSURE: 86 MMHG | TEMPERATURE: 97.8 F

## 2019-07-12 DIAGNOSIS — M54.41 CHRONIC BILATERAL LOW BACK PAIN WITH RIGHT-SIDED SCIATICA: ICD-10-CM

## 2019-07-12 DIAGNOSIS — M54.16 LUMBAR RADICULOPATHY: ICD-10-CM

## 2019-07-12 DIAGNOSIS — G89.29 CHRONIC BILATERAL LOW BACK PAIN WITH RIGHT-SIDED SCIATICA: ICD-10-CM

## 2019-07-12 DIAGNOSIS — M96.1 LUMBAR POST-LAMINECTOMY SYNDROME: ICD-10-CM

## 2019-07-12 DIAGNOSIS — G89.4 CHRONIC PAIN SYNDROME: Primary | ICD-10-CM

## 2019-07-12 PROCEDURE — 99214 OFFICE O/P EST MOD 30 MIN: CPT | Performed by: PAIN MEDICINE

## 2019-07-12 RX ORDER — HYDROCODONE BITARTRATE AND ACETAMINOPHEN 5; 325 MG/1; MG/1
1 TABLET ORAL 2 TIMES DAILY PRN
Qty: 60 TABLET | Refills: 0 | Status: SHIPPED | OUTPATIENT
Start: 2019-07-18 | End: 2019-08-09 | Stop reason: SDUPTHER

## 2019-08-06 ENCOUNTER — HOSPITAL ENCOUNTER (OUTPATIENT)
Age: 79
Discharge: HOME | End: 2019-08-08
Payer: MEDICARE

## 2019-08-06 ENCOUNTER — OFFICE VISIT (OUTPATIENT)
Dept: PRIMARY CARE CLINIC | Age: 79
End: 2019-08-06
Payer: MEDICARE

## 2019-08-06 VITALS
HEART RATE: 60 BPM | WEIGHT: 136 LBS | OXYGEN SATURATION: 98 % | DIASTOLIC BLOOD PRESSURE: 78 MMHG | BODY MASS INDEX: 24.09 KG/M2 | SYSTOLIC BLOOD PRESSURE: 128 MMHG

## 2019-08-06 DIAGNOSIS — E78.2 MIXED HYPERLIPIDEMIA: ICD-10-CM

## 2019-08-06 DIAGNOSIS — D51.1 VIT B12 DEFIC ANEMIA D/T SLCTV VIT B12 MALABSORP W PROTEIN: ICD-10-CM

## 2019-08-06 DIAGNOSIS — M81.0 OSTEOPOROSIS WITHOUT CURRENT PATHOLOGICAL FRACTURE, UNSPECIFIED OSTEOPOROSIS TYPE: ICD-10-CM

## 2019-08-06 DIAGNOSIS — M79.10 MYALGIA: ICD-10-CM

## 2019-08-06 DIAGNOSIS — R10.13 EPIGASTRIC PAIN: Primary | ICD-10-CM

## 2019-08-06 DIAGNOSIS — E11.9 DIET-CONTROLLED DIABETES MELLITUS (HCC): ICD-10-CM

## 2019-08-06 DIAGNOSIS — E03.9 ACQUIRED HYPOTHYROIDISM: ICD-10-CM

## 2019-08-06 DIAGNOSIS — R10.13 EPIGASTRIC PAIN: ICD-10-CM

## 2019-08-06 LAB
ALBUMIN SERPL-MCNC: 4.5 G/DL (ref 3.5–5.2)
ALP BLD-CCNC: 59 U/L (ref 35–104)
ALT SERPL-CCNC: 12 U/L (ref 0–32)
ANION GAP SERPL CALCULATED.3IONS-SCNC: 16 MMOL/L (ref 7–16)
AST SERPL-CCNC: 18 U/L (ref 0–31)
BASOPHILS ABSOLUTE: 0.05 E9/L (ref 0–0.2)
BASOPHILS RELATIVE PERCENT: 0.7 % (ref 0–2)
BILIRUB SERPL-MCNC: 0.5 MG/DL (ref 0–1.2)
BUN BLDV-MCNC: 21 MG/DL (ref 8–23)
CALCIUM SERPL-MCNC: 9.7 MG/DL (ref 8.6–10.2)
CHLORIDE BLD-SCNC: 91 MMOL/L (ref 98–107)
CHOLESTEROL, TOTAL: 145 MG/DL (ref 0–199)
CO2: 27 MMOL/L (ref 22–29)
CREAT SERPL-MCNC: 0.8 MG/DL (ref 0.5–1)
EOSINOPHILS ABSOLUTE: 0.2 E9/L (ref 0.05–0.5)
EOSINOPHILS RELATIVE PERCENT: 2.6 % (ref 0–6)
GFR AFRICAN AMERICAN: >60
GFR NON-AFRICAN AMERICAN: >60 ML/MIN/1.73
GLUCOSE BLD-MCNC: 105 MG/DL (ref 74–99)
HBA1C MFR BLD: 5.8 % (ref 4–5.6)
HCT VFR BLD CALC: 35.7 % (ref 34–48)
HDLC SERPL-MCNC: 49 MG/DL
HEMOGLOBIN: 12.1 G/DL (ref 11.5–15.5)
IMMATURE GRANULOCYTES #: 0.02 E9/L
IMMATURE GRANULOCYTES %: 0.3 % (ref 0–5)
IRON: 101 MCG/DL (ref 37–145)
LDL CHOLESTEROL CALCULATED: 71 MG/DL (ref 0–99)
LYMPHOCYTES ABSOLUTE: 2.19 E9/L (ref 1.5–4)
LYMPHOCYTES RELATIVE PERCENT: 28.5 % (ref 20–42)
MCH RBC QN AUTO: 32.6 PG (ref 26–35)
MCHC RBC AUTO-ENTMCNC: 33.9 % (ref 32–34.5)
MCV RBC AUTO: 96.2 FL (ref 80–99.9)
MONOCYTES ABSOLUTE: 0.66 E9/L (ref 0.1–0.95)
MONOCYTES RELATIVE PERCENT: 8.6 % (ref 2–12)
NEUTROPHILS ABSOLUTE: 4.56 E9/L (ref 1.8–7.3)
NEUTROPHILS RELATIVE PERCENT: 59.3 % (ref 43–80)
PDW BLD-RTO: 11.5 FL (ref 11.5–15)
PLATELET # BLD: 335 E9/L (ref 130–450)
PMV BLD AUTO: 9.7 FL (ref 7–12)
POTASSIUM SERPL-SCNC: 4.1 MMOL/L (ref 3.5–5)
RBC # BLD: 3.71 E12/L (ref 3.5–5.5)
SEDIMENTATION RATE, ERYTHROCYTE: 7 MM/HR (ref 0–20)
SODIUM BLD-SCNC: 134 MMOL/L (ref 132–146)
T4 TOTAL: 6.7 MCG/DL (ref 4.5–11.7)
TOTAL PROTEIN: 7.2 G/DL (ref 6.4–8.3)
TRIGL SERPL-MCNC: 125 MG/DL (ref 0–149)
TSH SERPL DL<=0.05 MIU/L-ACNC: 1.83 UIU/ML (ref 0.27–4.2)
VITAMIN B-12: 1340 PG/ML (ref 211–946)
VITAMIN D 25-HYDROXY: 48 NG/ML (ref 30–100)
VLDLC SERPL CALC-MCNC: 25 MG/DL
WBC # BLD: 7.7 E9/L (ref 4.5–11.5)

## 2019-08-06 PROCEDURE — 99213 OFFICE O/P EST LOW 20 MIN: CPT | Performed by: FAMILY MEDICINE

## 2019-08-06 PROCEDURE — 84436 ASSAY OF TOTAL THYROXINE: CPT

## 2019-08-06 PROCEDURE — 86618 LYME DISEASE ANTIBODY: CPT

## 2019-08-06 PROCEDURE — 83036 HEMOGLOBIN GLYCOSYLATED A1C: CPT

## 2019-08-06 PROCEDURE — 83540 ASSAY OF IRON: CPT

## 2019-08-06 PROCEDURE — 85651 RBC SED RATE NONAUTOMATED: CPT

## 2019-08-06 PROCEDURE — 80061 LIPID PANEL: CPT

## 2019-08-06 PROCEDURE — 85025 COMPLETE CBC W/AUTO DIFF WBC: CPT

## 2019-08-06 PROCEDURE — 82607 VITAMIN B-12: CPT

## 2019-08-06 PROCEDURE — 80053 COMPREHEN METABOLIC PANEL: CPT

## 2019-08-06 PROCEDURE — 36415 COLL VENOUS BLD VENIPUNCTURE: CPT

## 2019-08-06 PROCEDURE — 84443 ASSAY THYROID STIM HORMONE: CPT

## 2019-08-06 PROCEDURE — 82306 VITAMIN D 25 HYDROXY: CPT

## 2019-08-06 RX ORDER — SUCRALFATE 1 G/1
1 TABLET ORAL 4 TIMES DAILY
Qty: 120 TABLET | Refills: 3 | Status: SHIPPED
Start: 2019-08-06 | End: 2020-12-10

## 2019-08-06 ASSESSMENT — ENCOUNTER SYMPTOMS
ANAL BLEEDING: 0
ABDOMINAL PAIN: 1
ABDOMINAL DISTENTION: 0
BLOOD IN STOOL: 0

## 2019-08-06 NOTE — PROGRESS NOTES
RETROLISTHESIS----SHOTS WITH DR Ania Cha    ANX--DEP 1-14    OA HIPS DR CHARLTON---    DIET DM 4=16    LUMBAR DISC SURGERY 9-16 DR RIVAS    LEFT INGUINAL HERNIA WITH MESH DR BUNCH 7-17    ATHEROSCLEROIS EVAL DR BROTHERS 8-17    EVAL DR ALFONSO NEWMAN---7-18 ADIVSED ENT---PT SAYS HE TOLD HER NOT GET NEBULZIER---AND    REFUSESD FOLLOW UP    T-8 SPINAL CORD STIMULATOR TRIAL 5-8-19----    PAIN MEDS WITH DR Sisi Shah PAIN DOC      Past Medical History:   Diagnosis Date    Hyperlipidemia     Hypertension     TIA (transient ischemic attack)      No family history on file. Past Surgical History:   Procedure Laterality Date    BACK SURGERY      x3    CHOLECYSTECTOMY      HERNIA REPAIR  2018    HYSTERECTOMY      SPINAL CORD STIMULATOR IMPLANTATION N/A 5/6/2019    T8 SPINAL CORD STIMULATOR  TRIAL PLACEMENT--SUNITHA, KODAK TABLE, [facility]ENTIFIC performed by Jeremy Gonzales MD at [address] [place] [address] 5/14/2019    PLACEMENT OF  PERMANENT SPINAL CORD STIMULATOR---XRAY, KODAK TABLE, [place] [facility]ENTIFIC performed by Jeremy Gonzales MD at [address]:    08/06/19 1144   BP: 128/78   Pulse: 60   SpO2: 98%   Weight: 136 lb (61.7 kg)       Objective:    Physical Exam   Cardiovascular: Normal rate and regular rhythm. Pulmonary/Chest: Effort normal.   Abdominal: Soft. Mild epi tender with palp       Je Chioma was seen today for abdominal pain. Diagnoses and all orders for this visit:    Epigastric pain  -     sucralfate (CARAFATE) 1 GM tablet; Take 1 tablet by mouth 4 times daily  -     Bobbi Winchester MD, General Surgery, [facility]        Comments: appt dr Anne og  To   protonic   A great deal of time spent reviewing medications, diet, exercise, social issues. Also reviewing the chart before entering the room with patient and finishing charting after leaving patient's room.  More than half of that time was spent face to face with the patient in counseling and coordinating care. Follow Up: Return for see  ary jackson--see me  after orneals appt.      Seen by:  Francie Campo DO

## 2019-08-06 NOTE — PROGRESS NOTES
negative left  Trochanteric bursa tenderness:negative right, negative left  SLR:negative right, negative left, sitting      Extremities:     Tremors:None bilaterally upper and lower  Intact:Yes  Varicose veins:absent   Pulses:present Lt radial  Cyanosis:none  Edema:none x all 4 extremities     Neurological:     Sensory:normal to light touch BLE     Motor:                Right Quadriceps5/5          Left Quadriceps4/5           Right Gastrocnemius5/5    Left Gastrocnemius4/5  Right Ant Tibialis5/5  Left Ant Tibialis5/5    Gait:antalgic     Dermatology:     Skin:no rashes or lesions noted     Assessment/Plan:  LBP and RLE pain in L5 distribution with diffuse weakness of the LLE  Multiple prior lumbar fusion surgeries with Dr. Faiza Gonzales, most recent 9/2016 extending her fusion from T10-sacrum  On PLAVIX and ASA for Hx TIA's/CVA's  Has failed multiple surgeries, injections, PT, and medications  Had staged trial with Dr. Filemon Novoa 5/6/2019 and 5/14/2019, has had some improvement in her chronic pain thus far but does need a reprogramming, prior reprogramming has been helpful until she seems to lose the stimulation in the needed areas. We had an in-depth discussion about her SCS at the last visit, she initially stated it is not working, but when it is turned off during reprogramming she is visibly more painful  Ultimately she does admit the SCS is working, we discussed appropriate expectations from the stimulator in that it may not relieve 100% of her pain but to help in managing her pain  Currently her pain is improved but she is still significantly limited in her function  Parker Charles was here from [place] for reprogramming today, she will schedule reprogramming q 2 weeks with [facility] until they find the best program(s) for her. Her S.O.  Is here today and reports that he has noticed a significant improvement in her pain control since the SCS implant but does not that she still does have significant pain at times    Continue Norco 5/325 BID #60, plan to wean off if able but for now not able to wean any further  Continue gabapentin as currently prescribed from outside provider, TID  Continue off tizanidine if able, can take prn if necessary  Continue aquatic PT   Patient encouraged to stay active  Treatment plan discussed with the patient including medication and procedure side effects     Cc:  Referring physician    MALORIE Maxwell.

## 2019-08-09 ENCOUNTER — OFFICE VISIT (OUTPATIENT)
Dept: PAIN MANAGEMENT | Age: 79
End: 2019-08-09
Payer: MEDICARE

## 2019-08-09 VITALS
TEMPERATURE: 97.6 F | RESPIRATION RATE: 16 BRPM | OXYGEN SATURATION: 99 % | HEIGHT: 63 IN | SYSTOLIC BLOOD PRESSURE: 122 MMHG | BODY MASS INDEX: 24.63 KG/M2 | DIASTOLIC BLOOD PRESSURE: 62 MMHG | WEIGHT: 139 LBS | HEART RATE: 78 BPM

## 2019-08-09 DIAGNOSIS — G89.29 CHRONIC BILATERAL LOW BACK PAIN WITH RIGHT-SIDED SCIATICA: ICD-10-CM

## 2019-08-09 DIAGNOSIS — M96.1 LUMBAR POST-LAMINECTOMY SYNDROME: ICD-10-CM

## 2019-08-09 DIAGNOSIS — M54.41 CHRONIC BILATERAL LOW BACK PAIN WITH RIGHT-SIDED SCIATICA: ICD-10-CM

## 2019-08-09 DIAGNOSIS — G89.4 CHRONIC PAIN SYNDROME: Primary | ICD-10-CM

## 2019-08-09 DIAGNOSIS — M54.16 LUMBAR RADICULOPATHY: ICD-10-CM

## 2019-08-09 LAB — LYME, EIA: 0.25 LIV (ref 0–1.2)

## 2019-08-09 PROCEDURE — 99213 OFFICE O/P EST LOW 20 MIN: CPT | Performed by: PAIN MEDICINE

## 2019-08-09 RX ORDER — HYDROCODONE BITARTRATE AND ACETAMINOPHEN 5; 325 MG/1; MG/1
1 TABLET ORAL 2 TIMES DAILY PRN
Qty: 60 TABLET | Refills: 0 | Status: SHIPPED | OUTPATIENT
Start: 2019-08-21 | End: 2019-09-20 | Stop reason: SDUPTHER

## 2019-08-09 NOTE — PROGRESS NOTES
Alexis Vázquez presents to the [facility] on 8/9/2019. Climmie Gentleman is complaining of pain rt. Lower back /leg. The pain is constant. The pain is described as aching, throbbing, shooting and stabbing. Pain is rated on her best day at a 5, on her worst day at a 10, and on average at a 5 on the VAS scale. She took her last dose of Neurontin     Any procedures since your last visit: No, with% relief. Pacemaker or defibrilator: No managed by     She has been on anticoagulation medications to include Plavix and is managed by minotti      /62   Pulse 78   Temp 97.6 °F (36.4 °C) (Oral)   Resp 16   Ht 5' 3\" (1.6 m)   Wt 139 lb (63 kg)   SpO2 99%   BMI 24.62 kg/m²      No LMP recorded. Patient has had a hysterectomy.

## 2019-08-13 ENCOUNTER — OFFICE VISIT (OUTPATIENT)
Dept: SURGERY | Age: 79
End: 2019-08-13
Payer: MEDICARE

## 2019-08-13 VITALS
SYSTOLIC BLOOD PRESSURE: 153 MMHG | HEART RATE: 70 BPM | OXYGEN SATURATION: 96 % | HEIGHT: 63 IN | TEMPERATURE: 98 F | DIASTOLIC BLOOD PRESSURE: 79 MMHG | RESPIRATION RATE: 16 BRPM | WEIGHT: 141.2 LBS | BODY MASS INDEX: 25.02 KG/M2

## 2019-08-13 DIAGNOSIS — R10.84 GENERALIZED ABDOMINAL PAIN: Primary | ICD-10-CM

## 2019-08-13 PROCEDURE — 99203 OFFICE O/P NEW LOW 30 MIN: CPT | Performed by: SURGERY

## 2019-08-13 RX ORDER — DICYCLOMINE HYDROCHLORIDE 10 MG/1
10 CAPSULE ORAL 4 TIMES DAILY
Qty: 120 CAPSULE | Refills: 3 | Status: SHIPPED
Start: 2019-08-13 | End: 2022-06-03

## 2019-08-13 NOTE — PROGRESS NOTES
[facility] Note    Assessment/Plan:      Diagnosis Orders   1. Generalized abdominal pain  dicyclomine (BENTYL) 10 MG capsule    Plan for EGD and colonoscopy. Trial Bentyl. CT if persistent. Return for Endoscopy. Chief Complaint   Patient presents with    Abdominal Pain     Referred by Dr. Cary Lange for epigastric pain. been going on for awhile. pain is everyday. some foods trigger the pain. PCP: Sue Dodd DO    HPI: Vamsi Roca is a 78 y.o. female who presents in consultation for abdominal pain. Is located in epigastrium and lower abdomen as well. Is been ongoing for a few months. She says it sometimes help when she pushes a pillow against her stomach. Her pain is worse with eating. Any foods cause her issues. She has been on Protonix recently. She has started Carafate. She does not think it is helping. She has tried Tums and Kinza-Orange which she says helps for a \"little while. \"  She denies any nausea. She says it is \"just miserable. \"She says nothing makes her symptoms better. She says her bowels move normally. She has no hard stool or constipation. She does take Norco as needed for the last 11 years. She denies any blood in her stools or melena. She did have recent lab work which showed a normal white count and LFTs are okay. She has had cholecystectomy. Had a bowel movement does not help her symptoms. She is tried stool softeners which do not do any good it just gives her diarrhea. She had an EGD many years ago she says. She does not know why she had this performed. Her last colonoscopy was about 10 years ago and was normal per her report.     Past Medical History:   Diagnosis Date    Hyperlipidemia     Hypertension     TIA (transient ischemic attack)        Past Surgical History:   Procedure Laterality Date    BACK SURGERY      x3    CHOLECYSTECTOMY      HERNIA REPAIR  2018    HYSTERECTOMY      SPINAL CORD STIMULATOR IMPLANTATION Rash       Social History     Socioeconomic History    Marital status: Single     Spouse name: None    Number of children: None    Years of education: None    Highest education level: None   Occupational History    None   Social Needs    Financial resource strain: None    Food insecurity:     Worry: None     Inability: None    Transportation needs:     Medical: None     Non-medical: None   Tobacco Use    Smoking status: Never Smoker    Smokeless tobacco: Never Used   Substance and Sexual Activity    Alcohol use: Yes     Comment: occ    Drug use: No    Sexual activity: None   Lifestyle    Physical activity:     Days per week: None     Minutes per session: None    Stress: None   Relationships    Social connections:     Talks on phone: None     Gets together: None     Attends Sikh service: None     Active member of club or organization: None     Attends meetings of clubs or organizations: None     Relationship status: None    Intimate partner violence:     Fear of current or ex partner: None     Emotionally abused: None     Physically abused: None     Forced sexual activity: None   Other Topics Concern    None   Social History Narrative        CATARACTS    TINNITUS    GB OUT     C HYSTER    MOTHER  AT 42 FROM SUICIDE     CVA---F  52 GF  FROM CVA AT 46    FH HTN BRO    CATH NEG     OP    TICS    BOYFRIEND NHAN Fernandez Syd  1940 Page #2    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 BRADLEY---[employer]AY    CH--5    RETIRED [employer] THEN WORKS [employer]    CVA AFFECT L FACIAL SENSATION 2-08    TOMMY LUMBAR DISC OR ROB-----1-09    COLON 1-10----NEG BUT PREVIOUS TICS AND POLYP    TIA 1-11    SHINGLES R ABD 2-12    MRI  WITH L-2-3 DISC AND NERVE----DR RIVAS----OR SET UP    ADMIT  WITH TIA----DR Harrison Govern DC ASA AND ADDED PLAVIX TO THE PERSANTINE    LUMBAR CHERYL OR DR RIVAS 10-12------------AGAIN DONE SEAMUS RIVAS     MOTHER  SUICIDE AGE 42    DAD

## 2019-08-14 ENCOUNTER — TELEPHONE (OUTPATIENT)
Dept: SURGERY | Age: 79
End: 2019-08-14

## 2019-08-14 NOTE — TELEPHONE ENCOUNTER
MA called Ana and spoke with González Saunders who stated that procedure codes 22110, colonoscopy, and [phone], EGD do not require prior authorization.   Ref # [id number]    Electronically signed by Jerzy Kent MA on 8/14/2019 at 3:53 PM

## 2019-08-23 ENCOUNTER — TELEPHONE (OUTPATIENT)
Dept: SURGERY | Age: 79
End: 2019-08-23

## 2019-08-23 ENCOUNTER — TELEPHONE (OUTPATIENT)
Dept: FAMILY MEDICINE CLINIC | Age: 79
End: 2019-08-23

## 2019-08-26 ENCOUNTER — TELEPHONE (OUTPATIENT)
Dept: SURGERY | Age: 79
End: 2019-08-26

## 2019-08-26 NOTE — TELEPHONE ENCOUNTER
MA called the pt in regards to her Plavix. Pt is not to take it for 3 days per Dr. Kevin Mcknight. Her colonoscopy is scheduled for 8/28/19. Pt was told PAT would be calling her and to ask bout her other medications etc. Pt verbalized understanding.     Electronically signed by Johnie Chavarria on 8/26/19 at 8:50 AM

## 2019-08-28 ENCOUNTER — ANESTHESIA EVENT (OUTPATIENT)
Dept: ENDOSCOPY | Age: 79
End: 2019-08-28
Payer: MEDICARE

## 2019-08-28 ENCOUNTER — ANESTHESIA (OUTPATIENT)
Dept: ENDOSCOPY | Age: 79
End: 2019-08-28
Payer: MEDICARE

## 2019-08-28 ENCOUNTER — HOSPITAL ENCOUNTER (OUTPATIENT)
Age: 79
Setting detail: OUTPATIENT SURGERY
Discharge: HOME | End: 2019-08-28
Attending: SURGERY | Admitting: SURGERY
Payer: MEDICARE

## 2019-08-28 VITALS
DIASTOLIC BLOOD PRESSURE: 59 MMHG | SYSTOLIC BLOOD PRESSURE: 96 MMHG | RESPIRATION RATE: 19 BRPM | OXYGEN SATURATION: 96 %

## 2019-08-28 VITALS
DIASTOLIC BLOOD PRESSURE: 71 MMHG | HEART RATE: 64 BPM | SYSTOLIC BLOOD PRESSURE: 146 MMHG | WEIGHT: 140 LBS | HEIGHT: 63 IN | OXYGEN SATURATION: 99 % | TEMPERATURE: 98.6 F | RESPIRATION RATE: 18 BRPM | BODY MASS INDEX: 24.8 KG/M2

## 2019-08-28 PROCEDURE — 6360000002 HC RX W HCPCS

## 2019-08-28 PROCEDURE — 2580000003 HC RX 258: Performed by: SURGERY

## 2019-08-28 PROCEDURE — 88305 TISSUE EXAM BY PATHOLOGIST: CPT

## 2019-08-28 PROCEDURE — 2709999900 HC NON-CHARGEABLE SUPPLY: Performed by: SURGERY

## 2019-08-28 PROCEDURE — 3609010600 HC COLONOSCOPY POLYPECTOMY SNARE/COLD BIOPSY: Performed by: SURGERY

## 2019-08-28 PROCEDURE — 7100000011 HC PHASE II RECOVERY - ADDTL 15 MIN: Performed by: SURGERY

## 2019-08-28 PROCEDURE — 43239 EGD BIOPSY SINGLE/MULTIPLE: CPT | Performed by: SURGERY

## 2019-08-28 PROCEDURE — 7100000010 HC PHASE II RECOVERY - FIRST 15 MIN: Performed by: SURGERY

## 2019-08-28 PROCEDURE — 45380 COLONOSCOPY AND BIOPSY: CPT | Performed by: SURGERY

## 2019-08-28 PROCEDURE — 3609010300 HC COLONOSCOPY W/BIOPSY SINGLE/MULTIPLE: Performed by: SURGERY

## 2019-08-28 PROCEDURE — 3700000001 HC ADD 15 MINUTES (ANESTHESIA): Performed by: SURGERY

## 2019-08-28 PROCEDURE — 3609012400 HC EGD TRANSORAL BIOPSY SINGLE/MULTIPLE: Performed by: SURGERY

## 2019-08-28 PROCEDURE — 88342 IMHCHEM/IMCYTCHM 1ST ANTB: CPT

## 2019-08-28 PROCEDURE — 6370000000 HC RX 637 (ALT 250 FOR IP): Performed by: ANESTHESIOLOGY

## 2019-08-28 PROCEDURE — 3700000000 HC ANESTHESIA ATTENDED CARE: Performed by: SURGERY

## 2019-08-28 RX ORDER — SODIUM CHLORIDE 9 MG/ML
INJECTION, SOLUTION INTRAVENOUS CONTINUOUS
Status: DISCONTINUED | OUTPATIENT
Start: 2019-08-28 | End: 2019-08-28 | Stop reason: HOSPADM

## 2019-08-28 RX ORDER — SODIUM CHLORIDE 0.9 % (FLUSH) 0.9 %
10 SYRINGE (ML) INJECTION EVERY 12 HOURS SCHEDULED
Status: DISCONTINUED | OUTPATIENT
Start: 2019-08-28 | End: 2019-08-28 | Stop reason: HOSPADM

## 2019-08-28 RX ORDER — POLYETHYLENE GLYCOL 3350 17 G/17G
17 POWDER, FOR SOLUTION ORAL DAILY
Qty: 1530 G | Refills: 1 | Status: SHIPPED | OUTPATIENT
Start: 2019-08-28 | End: 2019-09-27

## 2019-08-28 RX ORDER — PROPOFOL 10 MG/ML
INJECTION, EMULSION INTRAVENOUS PRN
Status: DISCONTINUED | OUTPATIENT
Start: 2019-08-28 | End: 2019-08-28 | Stop reason: SDUPTHER

## 2019-08-28 RX ORDER — 0.9 % SODIUM CHLORIDE 0.9 %
10 VIAL (ML) INJECTION PRN
Status: DISCONTINUED | OUTPATIENT
Start: 2019-08-28 | End: 2019-08-28 | Stop reason: HOSPADM

## 2019-08-28 RX ORDER — SUCRALFATE 1 G/1
1 TABLET ORAL ONCE
Status: COMPLETED | OUTPATIENT
Start: 2019-08-28 | End: 2019-08-28

## 2019-08-28 RX ADMIN — SODIUM CHLORIDE: 9 INJECTION, SOLUTION INTRAVENOUS at 09:01

## 2019-08-28 RX ADMIN — PROPOFOL 40 MG: 10 INJECTION, EMULSION INTRAVENOUS at 09:59

## 2019-08-28 RX ADMIN — PROPOFOL 30 MG: 10 INJECTION, EMULSION INTRAVENOUS at 10:03

## 2019-08-28 RX ADMIN — SUCRALFATE 1 G: 1 TABLET ORAL at 12:26

## 2019-08-28 RX ADMIN — PROPOFOL 30 MG: 10 INJECTION, EMULSION INTRAVENOUS at 10:09

## 2019-08-28 RX ADMIN — PROPOFOL 30 MG: 10 INJECTION, EMULSION INTRAVENOUS at 10:06

## 2019-08-28 RX ADMIN — PROPOFOL 10 MG: 10 INJECTION, EMULSION INTRAVENOUS at 10:29

## 2019-08-28 RX ADMIN — PROPOFOL 30 MG: 10 INJECTION, EMULSION INTRAVENOUS at 10:17

## 2019-08-28 RX ADMIN — PROPOFOL 30 MG: 10 INJECTION, EMULSION INTRAVENOUS at 10:24

## 2019-08-28 RX ADMIN — PROPOFOL 30 MG: 10 INJECTION, EMULSION INTRAVENOUS at 10:13

## 2019-08-28 RX ADMIN — SODIUM CHLORIDE: 9 INJECTION, SOLUTION INTRAVENOUS at 09:58

## 2019-08-28 ASSESSMENT — PAIN DESCRIPTION - LOCATION
LOCATION: ABDOMEN

## 2019-08-28 ASSESSMENT — PAIN DESCRIPTION - ONSET
ONSET: ON-GOING

## 2019-08-28 ASSESSMENT — PAIN DESCRIPTION - FREQUENCY
FREQUENCY: CONTINUOUS

## 2019-08-28 ASSESSMENT — PAIN DESCRIPTION - PAIN TYPE
TYPE: CHRONIC PAIN

## 2019-08-28 ASSESSMENT — PAIN - FUNCTIONAL ASSESSMENT: PAIN_FUNCTIONAL_ASSESSMENT: 0-10

## 2019-08-28 ASSESSMENT — PAIN DESCRIPTION - PROGRESSION
CLINICAL_PROGRESSION: NOT CHANGED

## 2019-08-28 ASSESSMENT — PAIN SCALES - GENERAL
PAINLEVEL_OUTOF10: 5
PAINLEVEL_OUTOF10: 0
PAINLEVEL_OUTOF10: 5
PAINLEVEL_OUTOF10: 5

## 2019-08-28 ASSESSMENT — PAIN DESCRIPTION - ORIENTATION
ORIENTATION: MID;UPPER

## 2019-08-28 ASSESSMENT — PAIN DESCRIPTION - DESCRIPTORS
DESCRIPTORS: DISCOMFORT;CONSTANT

## 2019-08-28 NOTE — H&P
CHOLECYSTECTOMY        HERNIA REPAIR   2018    HYSTERECTOMY        SPINAL CORD STIMULATOR IMPLANTATION N/A 5/6/2019     T8 SPINAL CORD STIMULATOR  TRIAL PLACEMENT--SUNITHA, K[employer], [employer] performed by Fartun Altamirano MD at [address] [place] [address] 5/14/2019     PLACEMENT OF  PERMANENT SPINAL CORD STIMULATOR---XRAY, [employer], [employer] performed by Fartun Altamirano MD at [address] [place] [address] Medications           Prior to Admission medications    Medication Sig Start Date End Date Taking? Authorizing Provider   dicyclomine (BENTYL) 10 MG capsule Take 1 capsule by mouth 4 times daily 8/13/19   Yes Mallory Núñez MD   HYDROcodone-acetaminophen (NORCO) 5-325 MG per tablet Take 1 tablet by mouth 2 times daily as needed for Pain for up to 30 days. Intended supply: 30 days 8/21/19 9/20/19 Yes Cora Newman, DO   sucralfate (CARAFATE) 1 GM tablet Take 1 tablet by mouth 4 times daily 8/6/19   Yes Uriel Colunga, DO   citalopram (CELEXA) 10 MG tablet Take 10 mg by mouth daily  1/22/14   Yes Historical Provider, MD   clopidogrel (PLAVIX) 75 MG tablet Take by mouth 9/25/12   Yes Historical Provider, MD   losartan (COZAAR) 100 MG tablet Take by mouth daily  6/5/17   Yes Historical Provider, MD   pantoprazole (PROTONIX) 40 MG tablet TAKE 1 TABLET BY MOUTH ONCE DAILY ( TAKE B-12 1000MG AND D3 2000MG FOR GERD) 5/19/19   Yes Historical Provider, MD   simvastatin (ZOCOR) 10 MG tablet Take by mouth daily  9/11/12   Yes Historical Provider, MD   hydrochlorothiazide (HYDRODIURIL) 25 MG tablet Take 1 tablet by mouth daily 5/13/19   Yes Uriel Colunga,    carvedilol (COREG) 12.5 MG tablet Take 12.5 mg by mouth 2 times daily (with meals)     Yes Historical Provider, MD   gabapentin (NEURONTIN) 300 MG capsule Take 300 mg by mouth 4 times daily.  tid     Yes Historical Provider, MD   vitamin B-12 (CYANOCOBALAMIN) 1000 MCG tablet Take 1,000 mcg by mouth daily     Yes Historical

## 2019-08-28 NOTE — ANESTHESIA PRE PROCEDURE
Current medications:    Current Facility-Administered Medications   Medication Dose Route Frequency Provider Last Rate Last Dose    sodium chloride flush 0.9 % injection 10 mL  10 mL Intravenous 2 times per day Berta Carrel, MD        sodium chloride (PF) 0.9 % injection 10 mL  10 mL Intravenous PRN Berta Carrel, MD        0.9 % sodium chloride infusion   Intravenous Continuous Berta Carrel,  mL/hr at 08/28/19 0901         Allergies:     Allergies   Allergen Reactions    Pcn [Penicillins] Rash       Problem List:    Patient Active Problem List   Diagnosis Code    TIA (transient ischemic attack) G45.9    Hyperlipidemia E78.5    Chronic pain syndrome G89.4    Chronic bilateral low back pain with right-sided sciatica M54.41, G89.29    Lumbar radiculopathy M54.16    Lumbar post-laminectomy syndrome M96.1    Failed back syndrome M96.1       Past Medical History:        Diagnosis Date    Hyperlipidemia     Hypertension     TIA (transient ischemic attack)        Past Surgical History:        Procedure Laterality Date    BACK SURGERY      x3    CHOLECYSTECTOMY      HERNIA REPAIR  2018    HYSTERECTOMY      SPINAL CORD STIMULATOR IMPLANTATION N/A 5/6/2019    T8 SPINAL CORD STIMULATOR  TRIAL PLACEMENT--SUNITHA, [employer], [employer] performed by Damir Alberto MD at [facility] N/A 5/14/2019    PLACEMENT OF  PERMANENT SPINAL CORD STIMULATOR---XRAY, [employer], [employer] performed by Damir Alberto MD at [address] [place] [address] History:    Social History     Tobacco Use    Smoking status: Never Smoker    Smokeless tobacco: Never Used   Substance Use Topics    Alcohol use: Yes     Comment: occ                                Counseling given: Not Answered      Vital Signs (Current):   Vitals:    08/23/19 1416 08/28/19 0846   BP:  (!) 169/88   Pulse:  75   Resp:  20   Temp:  36.6 °C (97.8 °F)   TempSrc:  Temporal   SpO2:  100%   Weight: 140 lb CMP:   Lab Results   Component Value Date     08/06/2019    K 4.1 08/06/2019    CL 91 08/06/2019    CO2 27 08/06/2019    BUN 21 08/06/2019    CREATININE 0.8 08/06/2019    GFRAA >60 08/06/2019    LABGLOM >60 08/06/2019    GLUCOSE 105 08/06/2019    GLUCOSE 111 01/25/2011    PROT 7.2 08/06/2019    CALCIUM 9.7 08/06/2019    BILITOT 0.5 08/06/2019    ALKPHOS 59 08/06/2019    AST 18 08/06/2019    ALT 12 08/06/2019       POC Tests: No results for input(s): POCGLU, POCNA, POCK, POCCL, POCBUN, POCHEMO, POCHCT in the last 72 hours. Coags:   Lab Results   Component Value Date    PROTIME 11.5 05/14/2019    PROTIME 9.8 01/25/2011    INR 1.0 05/14/2019    APTT 25.5 09/09/2012       HCG (If Applicable): No results found for: PREGTESTUR, PREGSERUM, HCG, HCGQUANT     ABGs: No results found for: PHART, PO2ART, GJM7GGB, AJJ6RLQ, BEART, P5ODDOJO     Type & Screen (If Applicable):  No results found for: LABABO, [address]    Anesthesia Evaluation  Patient summary reviewed and Nursing notes reviewed no history of anesthetic complications:   Airway: Mallampati: III  TM distance: >3 FB   Neck ROM: full  Mouth opening: > = 3 FB Dental:    (+) upper dentures      Pulmonary:Negative Pulmonary ROS and normal exam  breath sounds clear to auscultation                             Cardiovascular:  Exercise tolerance: poor (<4 METS),   (+) hypertension:, hyperlipidemia        Rhythm: regular  Rate: normal                    Neuro/Psych:   (+) neuromuscular disease:, TIA,              ROS comment: Spinal cord stimulator GI/Hepatic/Renal:   (+) GERD:,          ROS comment: Hernia repair  Abdominal pain  Cholecystectomy. Endo/Other:              Pt had no PAT visit       Abdominal:           Vascular: negative vascular ROS. Anesthesia Plan      MAC     ASA 3     (Left AC 22 g IV)  Induction: intravenous. Anesthetic plan and risks discussed with patient.     Use of blood products discussed with

## 2019-09-10 ENCOUNTER — OFFICE VISIT (OUTPATIENT)
Dept: SURGERY | Age: 79
End: 2019-09-10
Payer: MEDICARE

## 2019-09-10 VITALS
HEART RATE: 63 BPM | BODY MASS INDEX: 24.45 KG/M2 | RESPIRATION RATE: 18 BRPM | SYSTOLIC BLOOD PRESSURE: 129 MMHG | TEMPERATURE: 98.3 F | OXYGEN SATURATION: 98 % | HEIGHT: 63 IN | WEIGHT: 138 LBS | DIASTOLIC BLOOD PRESSURE: 82 MMHG

## 2019-09-10 DIAGNOSIS — K25.9 MULTIPLE GASTRIC ULCERS: Primary | ICD-10-CM

## 2019-09-10 DIAGNOSIS — D12.6 TUBULAR ADENOMA OF COLON: ICD-10-CM

## 2019-09-10 PROCEDURE — 99213 OFFICE O/P EST LOW 20 MIN: CPT | Performed by: SURGERY

## 2019-09-20 ENCOUNTER — OFFICE VISIT (OUTPATIENT)
Dept: PAIN MANAGEMENT | Age: 79
End: 2019-09-20
Payer: MEDICARE

## 2019-09-20 VITALS
HEIGHT: 63 IN | SYSTOLIC BLOOD PRESSURE: 118 MMHG | DIASTOLIC BLOOD PRESSURE: 72 MMHG | HEART RATE: 84 BPM | BODY MASS INDEX: 24.45 KG/M2 | WEIGHT: 138 LBS | TEMPERATURE: 97.6 F | OXYGEN SATURATION: 98 % | RESPIRATION RATE: 16 BRPM

## 2019-09-20 DIAGNOSIS — M96.1 LUMBAR POST-LAMINECTOMY SYNDROME: ICD-10-CM

## 2019-09-20 DIAGNOSIS — M54.16 LUMBAR RADICULOPATHY: ICD-10-CM

## 2019-09-20 DIAGNOSIS — G89.29 CHRONIC BILATERAL LOW BACK PAIN WITH RIGHT-SIDED SCIATICA: ICD-10-CM

## 2019-09-20 DIAGNOSIS — G89.4 CHRONIC PAIN SYNDROME: ICD-10-CM

## 2019-09-20 DIAGNOSIS — M54.41 CHRONIC BILATERAL LOW BACK PAIN WITH RIGHT-SIDED SCIATICA: ICD-10-CM

## 2019-09-20 PROCEDURE — 99213 OFFICE O/P EST LOW 20 MIN: CPT | Performed by: PHYSICIAN ASSISTANT

## 2019-09-20 RX ORDER — HYDROCODONE BITARTRATE AND ACETAMINOPHEN 5; 325 MG/1; MG/1
1 TABLET ORAL 2 TIMES DAILY PRN
Qty: 60 TABLET | Refills: 0 | Status: SHIPPED | OUTPATIENT
Start: 2019-09-25 | End: 2019-10-18 | Stop reason: SDUPTHER

## 2019-09-20 NOTE — PROGRESS NOTES
[place] [facility]  [address] [facility], [address]    Follow up Note      Vamsi Chanelle     Date of Visit:  2019    CC:  Patient presents for follow up   Chief Complaint   Patient presents with    Follow-up     rt. side/buttocks       HPI:    Pain is worse. Having difficulty driving anything over 7-8 miles per day and then leg and foot hurt until the next day. Pain medication helpful, however. Appropriate analgesia with current medications regimen: no.    Change in quality of symptoms: no   Medication side effects:none. Recent diagnostic testing:none. Recent interventional procedures:none. She has been on anticoagulation medications to include Plavix. The patient  has not been on herbal supplements. The patient is not diabetic. Imaging:     Lumbar F/E films 2018 -   Posterior pedicle screw plates extend from F13 vertebral level to the   sacrum. Bony demineralization. Previous discectomy with loss of disc   space L1-2. Retrolisthesis L1-2 and anterolisthesis L4-5 S1. Multilevel disc space narrowing. No acute osseous finding. No   instability on flexion-extension views.      Lumbar MRI 2018 -   Posterior pedicle screw plates extend from K61 vertebral level to the   sacrum. Bony demineralization. Previous discectomy with loss of disc   space L1-2. Retrolisthesis L1-2 and anterolisthesis L4-5 S1. Multilevel disc space narrowing. No acute osseous finding.  No   instability on flexion-extension views.      Lumbar MRI 2016 -                   Potential Aberrant Drug-Related Behavior: no      Urine Drug Screenin2019 + hydrocodone and tramadol  2019 consistent     [place]ARRS report:  2019 consistent  2019 consistent  2019 consistent  2019 consistent  2019 consistent  2019 consistent  2019 consistent  2019 consistent         Past Medical History:   Diagnosis Date    Hyperlipidemia     Hypertension     TIA (transient ischemic attack)        Past worked with patient and was able to obtain coverage in her foot, but buttocks area is limited. Doing better than when she came in. She will be present at next visit.       Continue Norco 5/325 BID #60, plan to wean off if able but for now not able to wean any further. Ordered for 9/25/2019. Continue gabapentin as currently prescribed from outside provider, TID  Continue off tizanidine if able, can take prn if necessary  Continue aquatic PT - goes sometimes at [facility]  Patient encouraged to stay active  Treatment plan discussed with the patient including medication side effects     Controlled Substance Monitoring:    Acute and Chronic Pain Monitoring:   RX Monitoring 9/20/2019   Attestation -   Periodic Controlled Substance Monitoring Possible medication side effects, risk of tolerance/dependence & alternative treatments discussed. ;No signs of potential drug abuse or diversion identified. ;Assessed functional status. Plan:    We discussed with the patient that combining opioids, benzodiazepines, alcohol, illicit drugs or sleep aids increases the risk of respiratory depression including death. We discussed that these medications may cause drowsiness, sedation or dizziness and have counseled the patient not to drive or operate machinery. We have discussed that these medications will be prescribed only by one provider. We have discussed with the patient about age related risk factors and have thoroughly discussed the importance of taking these medications as prescribed. The patient verbalizes understanding.     ccreferring physic

## 2019-09-26 ENCOUNTER — OFFICE VISIT (OUTPATIENT)
Dept: FAMILY MEDICINE CLINIC | Age: 79
End: 2019-09-26
Payer: MEDICARE

## 2019-09-26 VITALS
BODY MASS INDEX: 23.38 KG/M2 | TEMPERATURE: 97.8 F | WEIGHT: 132 LBS | DIASTOLIC BLOOD PRESSURE: 78 MMHG | HEART RATE: 62 BPM | SYSTOLIC BLOOD PRESSURE: 120 MMHG

## 2019-09-26 DIAGNOSIS — N30.00 ACUTE CYSTITIS WITHOUT HEMATURIA: Primary | ICD-10-CM

## 2019-09-26 DIAGNOSIS — R10.84 GENERALIZED ABDOMINAL PAIN: ICD-10-CM

## 2019-09-26 PROCEDURE — 99213 OFFICE O/P EST LOW 20 MIN: CPT | Performed by: FAMILY MEDICINE

## 2019-09-26 RX ORDER — SULFAMETHOXAZOLE AND TRIMETHOPRIM 800; 160 MG/1; MG/1
1 TABLET ORAL 2 TIMES DAILY
Qty: 14 TABLET | Refills: 0 | Status: SHIPPED | OUTPATIENT
Start: 2019-09-26 | End: 2019-10-03

## 2019-09-26 ASSESSMENT — ENCOUNTER SYMPTOMS
VOMITING: 0
RESPIRATORY NEGATIVE: 1
ABDOMINAL PAIN: 1
BACK PAIN: 1

## 2019-09-26 NOTE — PROGRESS NOTES
simvastatin (ZOCOR) 10 MG tablet Take by mouth daily   Historical Provider, MD   hydrochlorothiazide (HYDRODIURIL) 25 MG tablet Take 1 tablet by mouth daily  Uriel Colunga DO   carvedilol (COREG) 12.5 MG tablet Take 12.5 mg by mouth 2 times daily (with meals)  Historical Provider, MD   gabapentin (NEURONTIN) 300 MG capsule Take 300 mg by mouth 4 times daily. tid  Historical Provider, MD   vitamin B-12 (CYANOCOBALAMIN) 1000 MCG tablet Take 1,000 mcg by mouth daily  Historical Provider, MD   vitamin D (D3-1000) 1000 units CAPS Take by mouth daily  Historical Provider, MD        Social History     Tobacco Use    Smoking status: Never Smoker    Smokeless tobacco: Never Used   Substance Use Topics    Alcohol use: Yes     Comment: occ        Vitals:    09/26/19 1034   BP: 120/78   Site: Right Upper Arm   Position: Sitting   Pulse: 62   Temp: 97.8 °F (36.6 °C)   TempSrc: Temporal   Weight: 132 lb (59.9 kg)     Estimated body mass index is 23.38 kg/m² as calculated from the following:    Height as of 9/20/19: 5' 3\" (1.6 m). Weight as of this encounter: 132 lb (59.9 kg). Physical Exam   Constitutional: She is oriented to person, place, and time. She appears well-developed and well-nourished. HENT:   Head: Normocephalic and atraumatic. Mouth/Throat: Oropharynx is clear and moist.   Eyes: Pupils are equal, round, and reactive to light. Conjunctivae and EOM are normal.   Cardiovascular: Normal rate, regular rhythm and normal heart sounds. Pulmonary/Chest: Effort normal and breath sounds normal.   Abdominal: Soft. Bowel sounds are normal. There is tenderness. There is CVA tenderness. Neurological: She is alert and oriented to person, place, and time. Skin: Skin is warm and dry. Psychiatric: Her behavior is normal. Judgment normal.   Vitals reviewed. Assessment/Plan:   Diagnosis Orders   1.  Acute cystitis without hematuria  XR Acute Abd Series Chest 1 VW    sulfamethoxazole-trimethoprim (BACTRIM DS;SEPTRA DS) 800-160 MG per tablet   2. Generalized abdominal pain  XR Acute Abd Series Chest 1 VW    sulfamethoxazole-trimethoprim (BACTRIM DS;SEPTRA DS) 800-160 MG per tablet         Counseled regarding above diagnosis, including possible risks and complications,  especially if left uncontrolled. Counseled regarding the possible side effects, risks, benefits and alternatives to treatment; patient and/or guardian verbalizes understanding, agrees, feels comfortable with and wishes to proceed with above treatment plan. Call or go to ED immediately if symptoms worsen or persist. Advised patient to call with any new medication issues, and, as applicable, read allRx info from pharmacy to assure aware of all possible risks and side effects of medication before taking. As applicable, educational materialsand/or home exercises printed for patient's review and were included in patient instructions on his/her After Visit Summary and given to patient at the end of visit. Patient and/or guardian given opportunity to ask questions/raise concerns. The patient verbalized comfort and understanding ofinstructions. Mukul Heredia D.O.   11:29 AM  9/26/2019       This document may have been prepared at least partially through the use of voice recognition software. Although effort is taken to assure the accuracy of this document, it is possible that grammatical, syntax,  or spelling errors may occur.

## 2019-10-10 RX ORDER — TIZANIDINE 4 MG/1
TABLET ORAL
COMMUNITY
Start: 2019-09-24 | End: 2019-10-25 | Stop reason: SDUPTHER

## 2019-10-10 RX ORDER — TRIAMCINOLONE ACETONIDE 55 UG/1
2 SPRAY, METERED NASAL DAILY
COMMUNITY
End: 2020-01-07

## 2019-10-18 ENCOUNTER — OFFICE VISIT (OUTPATIENT)
Dept: PAIN MANAGEMENT | Age: 79
End: 2019-10-18
Payer: MEDICARE

## 2019-10-18 VITALS
DIASTOLIC BLOOD PRESSURE: 72 MMHG | BODY MASS INDEX: 23.92 KG/M2 | RESPIRATION RATE: 18 BRPM | SYSTOLIC BLOOD PRESSURE: 128 MMHG | WEIGHT: 135 LBS | TEMPERATURE: 98.5 F | HEIGHT: 63 IN | HEART RATE: 60 BPM | OXYGEN SATURATION: 93 %

## 2019-10-18 DIAGNOSIS — M54.41 CHRONIC BILATERAL LOW BACK PAIN WITH RIGHT-SIDED SCIATICA: ICD-10-CM

## 2019-10-18 DIAGNOSIS — M54.16 LUMBAR RADICULOPATHY: ICD-10-CM

## 2019-10-18 DIAGNOSIS — G89.4 CHRONIC PAIN SYNDROME: ICD-10-CM

## 2019-10-18 DIAGNOSIS — M96.1 LUMBAR POST-LAMINECTOMY SYNDROME: ICD-10-CM

## 2019-10-18 DIAGNOSIS — G89.29 CHRONIC BILATERAL LOW BACK PAIN WITH RIGHT-SIDED SCIATICA: ICD-10-CM

## 2019-10-18 PROCEDURE — 99213 OFFICE O/P EST LOW 20 MIN: CPT | Performed by: PHYSICIAN ASSISTANT

## 2019-10-18 RX ORDER — HYDROCODONE BITARTRATE AND ACETAMINOPHEN 5; 325 MG/1; MG/1
1 TABLET ORAL 2 TIMES DAILY PRN
Qty: 60 TABLET | Refills: 0 | Status: SHIPPED | OUTPATIENT
Start: 2019-10-22 | End: 2019-11-15 | Stop reason: SDUPTHER

## 2019-10-24 ENCOUNTER — HOSPITAL ENCOUNTER (OUTPATIENT)
Age: 79
Discharge: HOME | End: 2019-10-26
Payer: MEDICARE

## 2019-10-24 ENCOUNTER — OFFICE VISIT (OUTPATIENT)
Dept: PRIMARY CARE CLINIC | Age: 79
End: 2019-10-24
Payer: MEDICARE

## 2019-10-24 DIAGNOSIS — N30.00 ACUTE CYSTITIS WITHOUT HEMATURIA: ICD-10-CM

## 2019-10-24 DIAGNOSIS — R33.9 URINE RETENTION: ICD-10-CM

## 2019-10-24 DIAGNOSIS — F41.9 ANXIETY: ICD-10-CM

## 2019-10-24 DIAGNOSIS — E78.2 MIXED HYPERLIPIDEMIA: ICD-10-CM

## 2019-10-24 DIAGNOSIS — E03.9 ACQUIRED HYPOTHYROIDISM: ICD-10-CM

## 2019-10-24 DIAGNOSIS — Z23 NEED FOR INFLUENZA VACCINATION: ICD-10-CM

## 2019-10-24 DIAGNOSIS — I10 ESSENTIAL HYPERTENSION: ICD-10-CM

## 2019-10-24 DIAGNOSIS — Z23 NEED FOR PNEUMOCOCCAL VACCINATION: ICD-10-CM

## 2019-10-24 DIAGNOSIS — M54.16 LUMBAR RADICULOPATHY: ICD-10-CM

## 2019-10-24 DIAGNOSIS — N30.00 ACUTE CYSTITIS WITHOUT HEMATURIA: Primary | ICD-10-CM

## 2019-10-24 LAB
BILIRUBIN, POC: NORMAL
BLOOD URINE, POC: NORMAL
CLARITY, POC: CLEAR
COLOR, POC: YELLOW
GLUCOSE URINE, POC: NORMAL
KETONES, POC: NORMAL
LEUKOCYTE EST, POC: NORMAL
NITRITE, POC: NORMAL
PH, POC: 7
PROTEIN, POC: NORMAL
SPECIFIC GRAVITY, POC: 1.01
UROBILINOGEN, POC: 0.2

## 2019-10-24 PROCEDURE — 90653 IIV ADJUVANT VACCINE IM: CPT | Performed by: FAMILY MEDICINE

## 2019-10-24 PROCEDURE — 51798 US URINE CAPACITY MEASURE: CPT | Performed by: FAMILY MEDICINE

## 2019-10-24 PROCEDURE — 99214 OFFICE O/P EST MOD 30 MIN: CPT | Performed by: FAMILY MEDICINE

## 2019-10-24 PROCEDURE — 87088 URINE BACTERIA CULTURE: CPT

## 2019-10-24 PROCEDURE — G0008 ADMIN INFLUENZA VIRUS VAC: HCPCS | Performed by: FAMILY MEDICINE

## 2019-10-24 PROCEDURE — G0009 ADMIN PNEUMOCOCCAL VACCINE: HCPCS | Performed by: FAMILY MEDICINE

## 2019-10-24 PROCEDURE — 81002 URINALYSIS NONAUTO W/O SCOPE: CPT | Performed by: FAMILY MEDICINE

## 2019-10-24 PROCEDURE — 90732 PPSV23 VACC 2 YRS+ SUBQ/IM: CPT | Performed by: FAMILY MEDICINE

## 2019-10-25 VITALS
HEIGHT: 63 IN | TEMPERATURE: 97.8 F | BODY MASS INDEX: 23.57 KG/M2 | DIASTOLIC BLOOD PRESSURE: 82 MMHG | WEIGHT: 133 LBS | SYSTOLIC BLOOD PRESSURE: 128 MMHG

## 2019-10-25 PROBLEM — M96.1 LUMBAR POST-LAMINECTOMY SYNDROME: Chronic | Status: ACTIVE | Noted: 2018-12-18

## 2019-10-25 PROBLEM — G89.29 CHRONIC BILATERAL LOW BACK PAIN WITH RIGHT-SIDED SCIATICA: Chronic | Status: ACTIVE | Noted: 2018-12-18

## 2019-10-25 PROBLEM — M54.16 LUMBAR RADICULOPATHY: Chronic | Status: ACTIVE | Noted: 2018-12-18

## 2019-10-25 PROBLEM — M54.41 CHRONIC BILATERAL LOW BACK PAIN WITH RIGHT-SIDED SCIATICA: Chronic | Status: ACTIVE | Noted: 2018-12-18

## 2019-10-25 PROBLEM — N30.00 ACUTE CYSTITIS WITHOUT HEMATURIA: Status: RESOLVED | Noted: 2019-09-26 | Resolved: 2019-10-25

## 2019-10-25 PROBLEM — G89.4 CHRONIC PAIN SYNDROME: Chronic | Status: ACTIVE | Noted: 2018-12-18

## 2019-10-25 ASSESSMENT — PATIENT HEALTH QUESTIONNAIRE - PHQ9
2. FEELING DOWN, DEPRESSED OR HOPELESS: 0
SUM OF ALL RESPONSES TO PHQ9 QUESTIONS 1 & 2: 0
SUM OF ALL RESPONSES TO PHQ QUESTIONS 1-9: 0
1. LITTLE INTEREST OR PLEASURE IN DOING THINGS: 0
SUM OF ALL RESPONSES TO PHQ QUESTIONS 1-9: 0

## 2019-10-25 ASSESSMENT — ENCOUNTER SYMPTOMS
GASTROINTESTINAL NEGATIVE: 1
RESPIRATORY NEGATIVE: 1
ALLERGIC/IMMUNOLOGIC NEGATIVE: 1
EYES NEGATIVE: 1
BACK PAIN: 1

## 2019-10-27 LAB — URINE CULTURE, ROUTINE: NORMAL

## 2019-10-28 RX ORDER — HYDROCHLOROTHIAZIDE 25 MG/1
25 TABLET ORAL DAILY
Qty: 90 TABLET | Refills: 3 | Status: SHIPPED
Start: 2019-10-28 | End: 2021-11-12 | Stop reason: SDUPTHER

## 2019-10-28 RX ORDER — CARVEDILOL 12.5 MG/1
12.5 TABLET ORAL 2 TIMES DAILY WITH MEALS
Qty: 180 TABLET | Refills: 3 | Status: SHIPPED
Start: 2019-10-28 | End: 2020-12-07 | Stop reason: SDUPTHER

## 2019-10-28 RX ORDER — CLOPIDOGREL BISULFATE 75 MG/1
75 TABLET ORAL DAILY
Qty: 90 TABLET | Refills: 3 | Status: SHIPPED
Start: 2019-10-28 | End: 2020-09-08 | Stop reason: SDUPTHER

## 2019-10-28 RX ORDER — GABAPENTIN 300 MG/1
300 CAPSULE ORAL 3 TIMES DAILY
Qty: 270 CAPSULE | Refills: 3 | Status: SHIPPED
Start: 2019-10-28 | End: 2021-01-13 | Stop reason: SDUPTHER

## 2019-10-28 RX ORDER — TIZANIDINE 4 MG/1
4 TABLET ORAL 3 TIMES DAILY
Qty: 270 TABLET | Refills: 3 | Status: SHIPPED
Start: 2019-10-28 | End: 2020-06-18 | Stop reason: SDUPTHER

## 2019-10-28 RX ORDER — LOSARTAN POTASSIUM 100 MG/1
100 TABLET ORAL DAILY
Qty: 90 TABLET | Refills: 3 | Status: SHIPPED | OUTPATIENT
Start: 2019-10-28 | End: 2020-01-18 | Stop reason: SDUPTHER

## 2019-10-28 RX ORDER — CITALOPRAM 10 MG/1
10 TABLET ORAL DAILY
Qty: 90 TABLET | Refills: 3 | Status: SHIPPED | OUTPATIENT
Start: 2019-10-28 | End: 2020-01-31 | Stop reason: SDUPTHER

## 2019-10-28 RX ORDER — SIMVASTATIN 10 MG
10 TABLET ORAL DAILY
Qty: 90 TABLET | Refills: 3 | Status: SHIPPED
Start: 2019-10-28 | End: 2020-02-26 | Stop reason: SDUPTHER

## 2019-11-15 ENCOUNTER — OFFICE VISIT (OUTPATIENT)
Dept: PAIN MANAGEMENT | Age: 79
End: 2019-11-15
Payer: MEDICARE

## 2019-11-15 VITALS
HEART RATE: 84 BPM | TEMPERATURE: 98.6 F | SYSTOLIC BLOOD PRESSURE: 124 MMHG | OXYGEN SATURATION: 98 % | RESPIRATION RATE: 18 BRPM | HEIGHT: 63 IN | WEIGHT: 129 LBS | BODY MASS INDEX: 22.86 KG/M2 | DIASTOLIC BLOOD PRESSURE: 72 MMHG

## 2019-11-15 DIAGNOSIS — M54.41 CHRONIC BILATERAL LOW BACK PAIN WITH RIGHT-SIDED SCIATICA: ICD-10-CM

## 2019-11-15 DIAGNOSIS — G89.29 CHRONIC BILATERAL LOW BACK PAIN WITH RIGHT-SIDED SCIATICA: ICD-10-CM

## 2019-11-15 DIAGNOSIS — G89.4 CHRONIC PAIN SYNDROME: ICD-10-CM

## 2019-11-15 DIAGNOSIS — M96.1 LUMBAR POST-LAMINECTOMY SYNDROME: ICD-10-CM

## 2019-11-15 DIAGNOSIS — M54.16 LUMBAR RADICULOPATHY: ICD-10-CM

## 2019-11-15 PROCEDURE — 99213 OFFICE O/P EST LOW 20 MIN: CPT | Performed by: PHYSICIAN ASSISTANT

## 2019-11-15 RX ORDER — HYDROCODONE BITARTRATE AND ACETAMINOPHEN 5; 325 MG/1; MG/1
1 TABLET ORAL 2 TIMES DAILY PRN
Qty: 60 TABLET | Refills: 0 | Status: SHIPPED | OUTPATIENT
Start: 2019-11-17 | End: 2019-12-13 | Stop reason: SDUPTHER

## 2019-12-13 ENCOUNTER — OFFICE VISIT (OUTPATIENT)
Dept: PAIN MANAGEMENT | Age: 79
End: 2019-12-13
Payer: MEDICARE

## 2019-12-13 VITALS
RESPIRATION RATE: 18 BRPM | HEART RATE: 72 BPM | SYSTOLIC BLOOD PRESSURE: 112 MMHG | OXYGEN SATURATION: 96 % | TEMPERATURE: 97.7 F | DIASTOLIC BLOOD PRESSURE: 72 MMHG | BODY MASS INDEX: 23.21 KG/M2 | HEIGHT: 63 IN | WEIGHT: 131 LBS

## 2019-12-13 DIAGNOSIS — M54.41 CHRONIC BILATERAL LOW BACK PAIN WITH RIGHT-SIDED SCIATICA: Primary | ICD-10-CM

## 2019-12-13 DIAGNOSIS — M54.16 LUMBAR RADICULOPATHY: ICD-10-CM

## 2019-12-13 DIAGNOSIS — G89.29 CHRONIC BILATERAL LOW BACK PAIN WITH RIGHT-SIDED SCIATICA: Primary | ICD-10-CM

## 2019-12-13 DIAGNOSIS — M96.1 LUMBAR POST-LAMINECTOMY SYNDROME: ICD-10-CM

## 2019-12-13 DIAGNOSIS — G89.4 CHRONIC PAIN SYNDROME: ICD-10-CM

## 2019-12-13 PROCEDURE — 99213 OFFICE O/P EST LOW 20 MIN: CPT | Performed by: PAIN MEDICINE

## 2019-12-13 RX ORDER — HYDROCODONE BITARTRATE AND ACETAMINOPHEN 5; 325 MG/1; MG/1
1 TABLET ORAL 2 TIMES DAILY PRN
Qty: 60 TABLET | Refills: 0 | Status: ON HOLD | OUTPATIENT
Start: 2019-12-17 | End: 2020-01-09 | Stop reason: SDUPTHER

## 2019-12-16 DIAGNOSIS — M54.16 LUMBAR RADICULOPATHY: Primary | ICD-10-CM

## 2019-12-17 ENCOUNTER — OFFICE VISIT (OUTPATIENT)
Dept: NEUROSURGERY | Age: 79
End: 2019-12-17
Payer: MEDICARE

## 2019-12-17 VITALS
HEART RATE: 74 BPM | SYSTOLIC BLOOD PRESSURE: 172 MMHG | DIASTOLIC BLOOD PRESSURE: 100 MMHG | WEIGHT: 133 LBS | HEIGHT: 63 IN | BODY MASS INDEX: 23.57 KG/M2

## 2019-12-17 DIAGNOSIS — T85.192A SPINAL CORD STIMULATOR DYSFUNCTION, INITIAL ENCOUNTER (HCC): Primary | ICD-10-CM

## 2019-12-17 PROCEDURE — 99213 OFFICE O/P EST LOW 20 MIN: CPT | Performed by: NEUROLOGICAL SURGERY

## 2019-12-18 ENCOUNTER — PREP FOR PROCEDURE (OUTPATIENT)
Dept: NEUROSURGERY | Age: 79
End: 2019-12-18

## 2019-12-18 DIAGNOSIS — T85.192D MALFUNCTION OF SPINAL CORD STIMULATOR, SUBSEQUENT ENCOUNTER: Primary | ICD-10-CM

## 2019-12-18 RX ORDER — SODIUM CHLORIDE 0.9 % (FLUSH) 0.9 %
10 SYRINGE (ML) INJECTION EVERY 12 HOURS SCHEDULED
Status: CANCELLED | OUTPATIENT
Start: 2019-12-18

## 2019-12-18 RX ORDER — SODIUM CHLORIDE 0.9 % (FLUSH) 0.9 %
10 SYRINGE (ML) INJECTION PRN
Status: CANCELLED | OUTPATIENT
Start: 2019-12-18

## 2019-12-18 RX ORDER — SODIUM CHLORIDE 9 MG/ML
INJECTION, SOLUTION INTRAVENOUS CONTINUOUS
Status: CANCELLED | OUTPATIENT
Start: 2019-12-18

## 2020-01-07 NOTE — PROGRESS NOTES
[facility] PRE-ADMISSION TESTING GENERAL INSTRUCTIONS- [facility]-phone number:[phone]    GENERAL INSTRUCTIONS  [x] Antibacterial Soap shower Night before and/or AM of Surgery  [] Abhijeet wipe instruction sheet and wipes given. [x] Nothing by mouth after midnight, including gum, candy, mints, or water. [x] You may brush your teeth, gargle, but do NOT swallow water. []Hibiclens shower  the night before and the morning of surgery. Do not use             Hibiclens on your face or head. [x]No smoking, chewing tobacco, illegal drugs, or alcohol within 24 hours of your surgery. [x] Jewelry, valuables or body piercing's should not be brought to the hospital. All body and/or tongue piercing's must be removed prior to arriving to hospital.  ALL hair pins must be removed. [x] Do not wear makeup, lotions, powders, deodorant. Nail polish as directed by the nurse. [x] Arrange transportation with a responsible adult  to and from the hospital. If you do not have a responsible adult  to transport you, you will need to make arrangements with a medical transportation company (i.e. [employer]. A Uber/taxi/bus is not appropriate unless you are accompanied by a responsible adult ). Arrange for someone to be with you for the remainder of the day and for 24 hours after your procedure due to having had anesthesia. Who will be your  for transportation?____so______________   Who will be staying with you for 24 hrs after your procedure?______________so____  [] Bring insurance card and photo ID.  [] Transfusion Bracelet: Please bring with you to hospital, day of surgery  [] Bring urine specimen day of surgery. Any small container is acceptable. [] Use inhalers the morning of surgery and bring with you to hospital.  [] Bring copy of living will or healthcare power of  papers to be placed in your electronic record.   [] CPAP/BI-PAP: Please bring your machine if you are to spend the night in the hospital.     PARKING INSTRUCTIONS:   [x] Arrival Time:___0700__________  · [x] Parking lot '\"I\"  is located on [facility] (the corner of [facility] and [facility]). To enter, press the button and the gate will lift. A free token will be provided to exit the lot. One car per patient is allowed to park in this lot. All other cars are to park on [address] [place] [address] either in the parking garage or the handicap lot. [] To reach the [facility] lobby from [address] [place] [address], upon entering the hospital, take elevator B to the 3rd floor. EDUCATION INSTRUCTIONS:      [] Knee or hip replacement booklet & exercise pamphlets given. [] Davian [address] placed in chart. [] Pre-admission Testing educational folder given  [] Incentive Spirometry,coughing & deep breathing exercises reviewed. []Medication information sheet(s)   []Fluoroscopy-Xray used in surgery reviewed with patient. Educational pamphlet placed in chart. []Pain: Post-op pain is normal and to be expected. You will be asked to rate your pain from 0-10(a zero is not acceptable-education is needed). Your post-op pain goal is:  [] Ask your nurse for your pain medication. [] Joint camp offered. [] Joint replacement booklets given. [] Other:___________________________    MEDICATION INSTRUCTIONS:   [x]Bring a complete list of your medications, please write the last time you took the medicine, give this list to the nurse. [x] Take the following medications the morning of surgery with 1-2 ounces of water:   [x] Stop herbal supplements and vitamins 5 days before your surgery. [] DO NOT take any diabetic medicine the morning of surgery. Follow instructions for insulin the day before surgery. [] If you are diabetic and your blood sugar is low or you feel symptomatic, you may drink 1-2 ounces of apple juice or take a glucose tablet.   The morning of your procedure, you may call the pre-op area if you have concerns about your blood sugar [phone]. [] Use your inhalers the morning of surgery. Bring your emergency inhaler with you day of surgery. [x] Follow physician instructions regarding any blood thinners you may be taking. has stopped plavix    WHAT TO EXPECT:  [x] The day of surgery you will be greeted and checked in by the [facility] [employer] [facility].  In addition, you will be registered in the [facility] by a Patient Access Representative. Please bring your photo ID and insurance card. A nurse will greet you in accordance to the time you are needed in the pre-op area to prepare you for surgery. Please do not be discouraged if you are not greeted in the order you arrive as there are many variables that are involved in patient preparation. Your patience is greatly appreciated as you wait for your nurse. Please bring in items such as: books, magazines, newspapers, electronics, or any other items  to occupy your time in the waiting area. []  Delays may occur with surgery and staff will make a sincere effort to keep you informed of delays. If any delays occur with your procedure, we apologize ahead of time for your inconvenience as we recognize the value of your time.

## 2020-01-08 ENCOUNTER — ANESTHESIA EVENT (OUTPATIENT)
Dept: OPERATING ROOM | Age: 80
End: 2020-01-08
Payer: MEDICARE

## 2020-01-09 ENCOUNTER — ANESTHESIA (OUTPATIENT)
Dept: OPERATING ROOM | Age: 80
End: 2020-01-09
Payer: MEDICARE

## 2020-01-09 ENCOUNTER — HOSPITAL ENCOUNTER (OUTPATIENT)
Age: 80
Setting detail: OUTPATIENT SURGERY
Discharge: HOME | End: 2020-01-09
Attending: NEUROLOGICAL SURGERY | Admitting: NEUROLOGICAL SURGERY
Payer: MEDICARE

## 2020-01-09 VITALS
RESPIRATION RATE: 16 BRPM | HEIGHT: 63 IN | TEMPERATURE: 98 F | HEART RATE: 73 BPM | BODY MASS INDEX: 23.57 KG/M2 | OXYGEN SATURATION: 98 % | SYSTOLIC BLOOD PRESSURE: 128 MMHG | WEIGHT: 133 LBS | DIASTOLIC BLOOD PRESSURE: 81 MMHG

## 2020-01-09 VITALS
RESPIRATION RATE: 11 BRPM | DIASTOLIC BLOOD PRESSURE: 93 MMHG | OXYGEN SATURATION: 100 % | SYSTOLIC BLOOD PRESSURE: 168 MMHG | TEMPERATURE: 96.6 F

## 2020-01-09 LAB
ANION GAP SERPL CALCULATED.3IONS-SCNC: 11 MMOL/L (ref 7–16)
BUN BLDV-MCNC: 13 MG/DL (ref 8–23)
CALCIUM SERPL-MCNC: 9.8 MG/DL (ref 8.6–10.2)
CHLORIDE BLD-SCNC: 99 MMOL/L (ref 98–107)
CO2: 30 MMOL/L (ref 22–29)
CREAT SERPL-MCNC: 0.7 MG/DL (ref 0.5–1)
EKG ATRIAL RATE: 56 BPM
EKG P AXIS: 72 DEGREES
EKG P-R INTERVAL: 168 MS
EKG Q-T INTERVAL: 436 MS
EKG QRS DURATION: 88 MS
EKG QTC CALCULATION (BAZETT): 420 MS
EKG R AXIS: 44 DEGREES
EKG T AXIS: 38 DEGREES
EKG VENTRICULAR RATE: 56 BPM
GFR AFRICAN AMERICAN: >60
GFR NON-AFRICAN AMERICAN: >60 ML/MIN/1.73
GLUCOSE BLD-MCNC: 106 MG/DL (ref 74–99)
HCT VFR BLD CALC: 38.2 % (ref 34–48)
HEMOGLOBIN: 12.3 G/DL (ref 11.5–15.5)
INR BLD: 1
MCH RBC QN AUTO: 31.9 PG (ref 26–35)
MCHC RBC AUTO-ENTMCNC: 32.2 % (ref 32–34.5)
MCV RBC AUTO: 99 FL (ref 80–99.9)
PDW BLD-RTO: 11.9 FL (ref 11.5–15)
PLATELET # BLD: 349 E9/L (ref 130–450)
PMV BLD AUTO: 9.4 FL (ref 7–12)
POTASSIUM SERPL-SCNC: 4.1 MMOL/L (ref 3.5–5)
PROTHROMBIN TIME: 10.9 SEC (ref 9.3–12.4)
RBC # BLD: 3.86 E12/L (ref 3.5–5.5)
SODIUM BLD-SCNC: 140 MMOL/L (ref 132–146)
WBC # BLD: 6.5 E9/L (ref 4.5–11.5)

## 2020-01-09 PROCEDURE — 2580000003 HC RX 258: Performed by: PHYSICIAN ASSISTANT

## 2020-01-09 PROCEDURE — 6360000002 HC RX W HCPCS: Performed by: NEUROLOGICAL SURGERY

## 2020-01-09 PROCEDURE — 85610 PROTHROMBIN TIME: CPT

## 2020-01-09 PROCEDURE — 2580000003 HC RX 258: Performed by: NEUROLOGICAL SURGERY

## 2020-01-09 PROCEDURE — 3700000000 HC ANESTHESIA ATTENDED CARE: Performed by: NEUROLOGICAL SURGERY

## 2020-01-09 PROCEDURE — 7100000010 HC PHASE II RECOVERY - FIRST 15 MIN: Performed by: NEUROLOGICAL SURGERY

## 2020-01-09 PROCEDURE — 2709999900 HC NON-CHARGEABLE SUPPLY: Performed by: NEUROLOGICAL SURGERY

## 2020-01-09 PROCEDURE — 36415 COLL VENOUS BLD VENIPUNCTURE: CPT

## 2020-01-09 PROCEDURE — 93005 ELECTROCARDIOGRAM TRACING: CPT | Performed by: PHYSICIAN ASSISTANT

## 2020-01-09 PROCEDURE — 2500000003 HC RX 250 WO HCPCS: Performed by: NEUROLOGICAL SURGERY

## 2020-01-09 PROCEDURE — 63688 REV/RMV IMP SP NPG/R DTCH CN: CPT | Performed by: NEUROLOGICAL SURGERY

## 2020-01-09 PROCEDURE — 3600000003 HC SURGERY LEVEL 3 BASE: Performed by: NEUROLOGICAL SURGERY

## 2020-01-09 PROCEDURE — 85027 COMPLETE CBC AUTOMATED: CPT

## 2020-01-09 PROCEDURE — 80048 BASIC METABOLIC PNL TOTAL CA: CPT

## 2020-01-09 PROCEDURE — 93010 ELECTROCARDIOGRAM REPORT: CPT | Performed by: INTERNAL MEDICINE

## 2020-01-09 PROCEDURE — 6360000002 HC RX W HCPCS

## 2020-01-09 PROCEDURE — 7100000011 HC PHASE II RECOVERY - ADDTL 15 MIN: Performed by: NEUROLOGICAL SURGERY

## 2020-01-09 PROCEDURE — 2500000003 HC RX 250 WO HCPCS

## 2020-01-09 PROCEDURE — 3700000001 HC ADD 15 MINUTES (ANESTHESIA): Performed by: NEUROLOGICAL SURGERY

## 2020-01-09 PROCEDURE — 7100000001 HC PACU RECOVERY - ADDTL 15 MIN: Performed by: NEUROLOGICAL SURGERY

## 2020-01-09 PROCEDURE — 6360000002 HC RX W HCPCS: Performed by: PHYSICIAN ASSISTANT

## 2020-01-09 PROCEDURE — 7100000000 HC PACU RECOVERY - FIRST 15 MIN: Performed by: NEUROLOGICAL SURGERY

## 2020-01-09 PROCEDURE — 3600000013 HC SURGERY LEVEL 3 ADDTL 15MIN: Performed by: NEUROLOGICAL SURGERY

## 2020-01-09 RX ORDER — MEPERIDINE HYDROCHLORIDE 50 MG/ML
12.5 INJECTION INTRAMUSCULAR; INTRAVENOUS; SUBCUTANEOUS EVERY 5 MIN PRN
Status: DISCONTINUED | OUTPATIENT
Start: 2020-01-09 | End: 2020-01-09 | Stop reason: HOSPADM

## 2020-01-09 RX ORDER — SODIUM CHLORIDE 0.9 % (FLUSH) 0.9 %
10 SYRINGE (ML) INJECTION EVERY 12 HOURS SCHEDULED
Status: DISCONTINUED | OUTPATIENT
Start: 2020-01-09 | End: 2020-01-09 | Stop reason: HOSPADM

## 2020-01-09 RX ORDER — ONDANSETRON 2 MG/ML
4 INJECTION INTRAMUSCULAR; INTRAVENOUS EVERY 6 HOURS PRN
Status: CANCELLED | OUTPATIENT
Start: 2020-01-09

## 2020-01-09 RX ORDER — SODIUM CHLORIDE 0.9 % (FLUSH) 0.9 %
10 SYRINGE (ML) INJECTION PRN
Status: CANCELLED | OUTPATIENT
Start: 2020-01-09

## 2020-01-09 RX ORDER — GLYCOPYRROLATE 1 MG/5 ML
SYRINGE (ML) INTRAVENOUS PRN
Status: DISCONTINUED | OUTPATIENT
Start: 2020-01-09 | End: 2020-01-09 | Stop reason: SDUPTHER

## 2020-01-09 RX ORDER — ROCURONIUM BROMIDE 10 MG/ML
INJECTION, SOLUTION INTRAVENOUS PRN
Status: DISCONTINUED | OUTPATIENT
Start: 2020-01-09 | End: 2020-01-09 | Stop reason: SDUPTHER

## 2020-01-09 RX ORDER — PROMETHAZINE HYDROCHLORIDE 25 MG/ML
6.25 INJECTION, SOLUTION INTRAMUSCULAR; INTRAVENOUS EVERY 10 MIN PRN
Status: DISCONTINUED | OUTPATIENT
Start: 2020-01-09 | End: 2020-01-09 | Stop reason: HOSPADM

## 2020-01-09 RX ORDER — CLINDAMYCIN HYDROCHLORIDE 300 MG/1
300 CAPSULE ORAL 3 TIMES DAILY
Qty: 21 CAPSULE | Refills: 0 | Status: SHIPPED | OUTPATIENT
Start: 2020-01-09 | End: 2020-01-16

## 2020-01-09 RX ORDER — BUPIVACAINE HYDROCHLORIDE 2.5 MG/ML
INJECTION, SOLUTION EPIDURAL; INFILTRATION; INTRACAUDAL PRN
Status: DISCONTINUED | OUTPATIENT
Start: 2020-01-09 | End: 2020-01-09 | Stop reason: ALTCHOICE

## 2020-01-09 RX ORDER — NEOSTIGMINE METHYLSULFATE 1 MG/ML
INJECTION, SOLUTION INTRAVENOUS PRN
Status: DISCONTINUED | OUTPATIENT
Start: 2020-01-09 | End: 2020-01-09 | Stop reason: SDUPTHER

## 2020-01-09 RX ORDER — SODIUM CHLORIDE 9 MG/ML
INJECTION, SOLUTION INTRAVENOUS CONTINUOUS
Status: DISCONTINUED | OUTPATIENT
Start: 2020-01-09 | End: 2020-01-09 | Stop reason: HOSPADM

## 2020-01-09 RX ORDER — DEXAMETHASONE SODIUM PHOSPHATE 10 MG/ML
INJECTION INTRAMUSCULAR; INTRAVENOUS PRN
Status: DISCONTINUED | OUTPATIENT
Start: 2020-01-09 | End: 2020-01-09 | Stop reason: SDUPTHER

## 2020-01-09 RX ORDER — SODIUM CHLORIDE 0.9 % (FLUSH) 0.9 %
10 SYRINGE (ML) INJECTION PRN
Status: DISCONTINUED | OUTPATIENT
Start: 2020-01-09 | End: 2020-01-09 | Stop reason: HOSPADM

## 2020-01-09 RX ORDER — SENNA PLUS 8.6 MG/1
1 TABLET ORAL 2 TIMES DAILY
Qty: 60 TABLET | Refills: 0 | Status: SHIPPED | OUTPATIENT
Start: 2020-01-09 | End: 2021-01-08

## 2020-01-09 RX ORDER — HYDROCODONE BITARTRATE AND ACETAMINOPHEN 5; 325 MG/1; MG/1
1 TABLET ORAL PRN
Status: DISCONTINUED | OUTPATIENT
Start: 2020-01-09 | End: 2020-01-09 | Stop reason: HOSPADM

## 2020-01-09 RX ORDER — SODIUM CHLORIDE 0.9 % (FLUSH) 0.9 %
10 SYRINGE (ML) INJECTION EVERY 12 HOURS SCHEDULED
Status: CANCELLED | OUTPATIENT
Start: 2020-01-09

## 2020-01-09 RX ORDER — MORPHINE SULFATE 2 MG/ML
1 INJECTION, SOLUTION INTRAMUSCULAR; INTRAVENOUS EVERY 5 MIN PRN
Status: DISCONTINUED | OUTPATIENT
Start: 2020-01-09 | End: 2020-01-09 | Stop reason: HOSPADM

## 2020-01-09 RX ORDER — ONDANSETRON 2 MG/ML
INJECTION INTRAMUSCULAR; INTRAVENOUS PRN
Status: DISCONTINUED | OUTPATIENT
Start: 2020-01-09 | End: 2020-01-09 | Stop reason: SDUPTHER

## 2020-01-09 RX ORDER — FENTANYL CITRATE 50 UG/ML
INJECTION, SOLUTION INTRAMUSCULAR; INTRAVENOUS PRN
Status: DISCONTINUED | OUTPATIENT
Start: 2020-01-09 | End: 2020-01-09 | Stop reason: SDUPTHER

## 2020-01-09 RX ORDER — HYDROCODONE BITARTRATE AND ACETAMINOPHEN 5; 325 MG/1; MG/1
2 TABLET ORAL PRN
Status: DISCONTINUED | OUTPATIENT
Start: 2020-01-09 | End: 2020-01-09 | Stop reason: HOSPADM

## 2020-01-09 RX ORDER — PROPOFOL 10 MG/ML
INJECTION, EMULSION INTRAVENOUS PRN
Status: DISCONTINUED | OUTPATIENT
Start: 2020-01-09 | End: 2020-01-09 | Stop reason: SDUPTHER

## 2020-01-09 RX ORDER — LIDOCAINE HYDROCHLORIDE 20 MG/ML
INJECTION, SOLUTION INTRAVENOUS PRN
Status: DISCONTINUED | OUTPATIENT
Start: 2020-01-09 | End: 2020-01-09 | Stop reason: SDUPTHER

## 2020-01-09 RX ORDER — SENNA AND DOCUSATE SODIUM 50; 8.6 MG/1; MG/1
1 TABLET, FILM COATED ORAL 2 TIMES DAILY
Status: CANCELLED | OUTPATIENT
Start: 2020-01-09

## 2020-01-09 RX ORDER — DOCUSATE SODIUM 100 MG/1
100 CAPSULE, LIQUID FILLED ORAL 2 TIMES DAILY
Status: CANCELLED | OUTPATIENT
Start: 2020-01-09

## 2020-01-09 RX ORDER — MORPHINE SULFATE 2 MG/ML
2 INJECTION, SOLUTION INTRAMUSCULAR; INTRAVENOUS EVERY 5 MIN PRN
Status: DISCONTINUED | OUTPATIENT
Start: 2020-01-09 | End: 2020-01-09 | Stop reason: HOSPADM

## 2020-01-09 RX ORDER — LIDOCAINE HYDROCHLORIDE AND EPINEPHRINE 10; 10 MG/ML; UG/ML
INJECTION, SOLUTION INFILTRATION; PERINEURAL PRN
Status: DISCONTINUED | OUTPATIENT
Start: 2020-01-09 | End: 2020-01-09 | Stop reason: ALTCHOICE

## 2020-01-09 RX ORDER — HYDROCODONE BITARTRATE AND ACETAMINOPHEN 5; 325 MG/1; MG/1
1 TABLET ORAL EVERY 4 HOURS PRN
Qty: 42 TABLET | Refills: 0 | Status: SHIPPED | OUTPATIENT
Start: 2020-01-09 | End: 2020-02-03 | Stop reason: SDUPTHER

## 2020-01-09 RX ADMIN — VANCOMYCIN HYDROCHLORIDE 1000 MG: 1 INJECTION, POWDER, LYOPHILIZED, FOR SOLUTION INTRAVENOUS at 08:28

## 2020-01-09 RX ADMIN — LIDOCAINE HYDROCHLORIDE 60 MG: 20 INJECTION, SOLUTION INTRAVENOUS at 09:19

## 2020-01-09 RX ADMIN — ROCURONIUM BROMIDE 25 MG: 10 INJECTION, SOLUTION INTRAVENOUS at 09:19

## 2020-01-09 RX ADMIN — SODIUM CHLORIDE: 9 INJECTION, SOLUTION INTRAVENOUS at 07:56

## 2020-01-09 RX ADMIN — PHENYLEPHRINE HYDROCHLORIDE 100 MCG: 10 INJECTION INTRAVENOUS at 09:57

## 2020-01-09 RX ADMIN — FENTANYL CITRATE 50 MCG: 50 INJECTION, SOLUTION INTRAMUSCULAR; INTRAVENOUS at 09:19

## 2020-01-09 RX ADMIN — Medication 0.6 MG: at 10:04

## 2020-01-09 RX ADMIN — DEXAMETHASONE SODIUM PHOSPHATE 10 MG: 10 INJECTION INTRAMUSCULAR; INTRAVENOUS at 09:19

## 2020-01-09 RX ADMIN — ONDANSETRON HYDROCHLORIDE 4 MG: 2 INJECTION, SOLUTION INTRAMUSCULAR; INTRAVENOUS at 09:54

## 2020-01-09 RX ADMIN — PROPOFOL 150 MG: 10 INJECTION, EMULSION INTRAVENOUS at 09:19

## 2020-01-09 RX ADMIN — Medication 3 MG: at 10:04

## 2020-01-09 ASSESSMENT — PAIN SCALES - GENERAL
PAINLEVEL_OUTOF10: 0

## 2020-01-09 ASSESSMENT — PULMONARY FUNCTION TESTS
PIF_VALUE: 21
PIF_VALUE: 1
PIF_VALUE: 22
PIF_VALUE: 22
PIF_VALUE: 20
PIF_VALUE: 22
PIF_VALUE: 2
PIF_VALUE: 16
PIF_VALUE: 14
PIF_VALUE: 23
PIF_VALUE: 16
PIF_VALUE: 22
PIF_VALUE: 2
PIF_VALUE: 1
PIF_VALUE: 23
PIF_VALUE: 22
PIF_VALUE: 1
PIF_VALUE: 16
PIF_VALUE: 23
PIF_VALUE: 3
PIF_VALUE: 23
PIF_VALUE: 16
PIF_VALUE: 2
PIF_VALUE: 21
PIF_VALUE: 26
PIF_VALUE: 23
PIF_VALUE: 20
PIF_VALUE: 26
PIF_VALUE: 1
PIF_VALUE: 22
PIF_VALUE: 21
PIF_VALUE: 3
PIF_VALUE: 22
PIF_VALUE: 23
PIF_VALUE: 21
PIF_VALUE: 20
PIF_VALUE: 20
PIF_VALUE: 16
PIF_VALUE: 22
PIF_VALUE: 21
PIF_VALUE: 22
PIF_VALUE: 2
PIF_VALUE: 15
PIF_VALUE: 21
PIF_VALUE: 18
PIF_VALUE: 1
PIF_VALUE: 20
PIF_VALUE: 16
PIF_VALUE: 21
PIF_VALUE: 22
PIF_VALUE: 16
PIF_VALUE: 20
PIF_VALUE: 22
PIF_VALUE: 15
PIF_VALUE: 2
PIF_VALUE: 22
PIF_VALUE: 26
PIF_VALUE: 1

## 2020-01-09 ASSESSMENT — PAIN DESCRIPTION - DESCRIPTORS: DESCRIPTORS: CONSTANT

## 2020-01-09 ASSESSMENT — PAIN - FUNCTIONAL ASSESSMENT: PAIN_FUNCTIONAL_ASSESSMENT: 0-10

## 2020-01-09 NOTE — ANESTHESIA PRE PROCEDURE
Department of Anesthesiology  Preprocedure Note       Name:  Dakotah Porter   Age:  78 y.o.  :  1940                                          MRN:  [id number]         Date:  2020      Surgeon: Sudha Reina):  Rowdy Rodney MD    Procedure: REVISION OF SPINAL CORD STIMULATOR  BATTERY AND POCKET--KODAK TABLE (N/A )    Medications prior to admission:   Prior to Admission medications    Medication Sig Start Date End Date Taking? Authorizing Provider   HYDROcodone-acetaminophen (NORCO) 5-325 MG per tablet Take 1 tablet by mouth 2 times daily as needed for Pain for up to 30 days. Intended supply: 30 days 19 Yes Khadra Ware, DO   carvedilol (COREG) 12.5 MG tablet Take 1 tablet by mouth 2 times daily (with meals) 10/28/19  Yes Uriel Colunga DO   hydrochlorothiazide (HYDRODIURIL) 25 MG tablet Take 1 tablet by mouth daily 10/28/19  Yes Uriel Colunga DO   losartan (COZAAR) 100 MG tablet Take 1 tablet by mouth daily 10/28/19  Yes Uriel Colunga DO   tiZANidine (ZANAFLEX) 4 MG tablet Take 1 tablet by mouth 3 times daily 10/28/19  Yes Uriel Colunga DO   citalopram (CELEXA) 10 MG tablet Take 1 tablet by mouth daily 10/28/19  Yes Uriel Colunga DO   clopidogrel (PLAVIX) 75 MG tablet Take 1 tablet by mouth daily 10/28/19  Yes Uriel Colunga DO   simvastatin (ZOCOR) 10 MG tablet Take 1 tablet by mouth daily 10/28/19  Yes Uriel Colunga DO   gabapentin (NEURONTIN) 300 MG capsule Take 1 capsule by mouth 3 times daily for 360 days. Patient taking differently: Take 300 mg by mouth 5 times daily.   10/28/19 10/22/20 Yes Uriel Colunga DO   dicyclomine (BENTYL) 10 MG capsule Take 1 capsule by mouth 4 times daily 19  Yes Kimmy Vicente MD   Handicap Placard MISC by Does not apply route Dx  Arthritis     5 yrs 19   Rob Colunga DO   sucralfate (CARAFATE) 1 GM tablet Take 1 tablet by mouth 4 times daily 19   Uriel Colunga DO   pantoprazole (PROTONIX) 40 MG tablet [employer] performed by Deirdre Recinos MD at [facility] N/A 8/28/2019    EGD BIOPSY performed by Olivia Chen MD at [address] History:    Social History     Tobacco Use    Smoking status: Never Smoker    Smokeless tobacco: Never Used   Substance Use Topics    Alcohol use: Yes     Alcohol/week: 1.0 standard drinks     Types: 1 Glasses of wine per week     Comment: rare                                Counseling given: Not Answered      Vital Signs (Current): There were no vitals filed for this visit. BP Readings from Last 3 Encounters:   12/17/19 (!) 172/100   12/13/19 112/72   11/15/19 124/72       NPO Status:  1/8/20 2200                                                                               BMI:   Wt Readings from Last 3 Encounters:   12/17/19 133 lb (60.3 kg)   12/13/19 131 lb (59.4 kg)   11/15/19 129 lb (58.5 kg)     There is no height or weight on file to calculate BMI.    CBC:   Lab Results   Component Value Date    WBC 7.7 08/06/2019    RBC 3.71 08/06/2019    HGB 12.1 08/06/2019    HCT 35.7 08/06/2019    MCV 96.2 08/06/2019    RDW 11.5 08/06/2019     08/06/2019       CMP:   Lab Results   Component Value Date     08/06/2019    K 4.1 08/06/2019    CL 91 08/06/2019    CO2 27 08/06/2019    BUN 21 08/06/2019    CREATININE 0.8 08/06/2019    GFRAA >60 08/06/2019    LABGLOM >60 08/06/2019    GLUCOSE 105 08/06/2019    GLUCOSE 111 01/25/2011    PROT 7.2 08/06/2019    CALCIUM 9.7 08/06/2019    BILITOT 0.5 08/06/2019    ALKPHOS 59 08/06/2019    AST 18 08/06/2019    ALT 12 08/06/2019       POC Tests: No results for input(s): POCGLU, POCNA, POCK, POCCL, POCBUN, POCHEMO, POCHCT in the last 72 hours.     Coags:   Lab Results   Component Value Date    PROTIME 11.5 05/14/2019    PROTIME 9.8 01/25/2011    INR 1.0 05/14/2019    APTT 25.5 09/09/2012       HCG (If Applicable): No results found for: PREGTESTUR, PREGSERUM, HCG, HCGQUANT     ABGs: No results found for: PHART, PO2ART, MPB4VCZ, UFM9JKK, BEART, [id number]     Type & Screen (If Applicable):  No results found for: LABABO, LABRH     EKG 4/30/19  Sinus bradycardia  Incomplete right bundle branch block  Otherwise normal ECG    Anesthesia Evaluation  Patient summary reviewed and Nursing notes reviewed no history of anesthetic complications:   Airway: Mallampati: II  TM distance: >3 FB   Neck ROM: full  Mouth opening: > = 3 FB Dental:    (+) upper dentures      Pulmonary:Negative Pulmonary ROS breath sounds clear to auscultation  (+) decreased breath sounds,                             Cardiovascular:    (+) hypertension:, hyperlipidemia      ECG reviewed  Rhythm: regular  Rate: normal           Beta Blocker:  Dose within 24 Hrs         Neuro/Psych:   (+) CVA: no interval change, neuromuscular disease (Chronic pain syndrome, right sided sciatica):, TIA, depression/anxiety             GI/Hepatic/Renal:   (+) GERD: well controlled,           Endo/Other:    (+) : arthritis: OA., .                  ROS comment: Osteoporosis  Shingles R abd  Diverticulitis  Tinnitus Abdominal:           Vascular: negative vascular ROS. Anesthesia Plan      general     ASA 3       Induction: intravenous. Anesthetic plan and risks discussed with patient. Use of blood products discussed with patient whom consented to blood products. Plan discussed with CRNA and attending. Anaid Hill RN   1/9/2020      Pt seen, examined, chart reviewed, plan discussed.   Brodie Bird  1/9/2020  9:25 AM

## 2020-01-09 NOTE — BRIEF OP NOTE
Brief Postoperative Note  ______________________________________________________________    Patient: Marimar Albert  YOB: 1940  MRN: [id number]  Date of Procedure: 1/9/2020    Pre-Op Diagnosis: SPINAL CORD STIMULATOR DISFUCTION    Post-Op Diagnosis: Same       Procedure(s):  REVISION OF SPINAL CORD STIMULATOR  BATTERY AND POCKET    Anesthesia: General    Surgeon(s):  Osmar Stroud MD    Assistant: Jeanette Shah    Estimated Blood Loss (mL): less than 50     Complications: None    Specimens:   * No specimens in log *    Implants:  * No implants in log *      Drains: * No LDAs found *    Findings: see dictated op note    Alex Sands MD  Date: 1/9/2020  Time: 10:38 AM

## 2020-01-09 NOTE — ANESTHESIA POSTPROCEDURE EVALUATION
Department of Anesthesiology  Postprocedure Note    Patient: Bre Perez  MRN: [id number]  YOB: 1940  Date of evaluation: 1/9/2020  Time:  11:09 AM     Procedure Summary     Date:  01/09/20 Room / Location:  [facility] OR  / [facility]    Anesthesia Start:  1304 Anesthesia Stop:      Procedure:  REVISION OF SPINAL CORD STIMULATOR  BATTERY AND POCKET--KODAK TABLE (N/A ) Diagnosis:  ([facility])    Surgeon:  Josee Yañez MD Responsible Provider:  Rachel Shannon MD    Anesthesia Type:  general ASA Status:  3          Anesthesia Type: general    Anthony Phase I: Anthony Score: 8    Anthony Phase II:      Last vitals: Reviewed and per EMR flowsheets.        Anesthesia Post Evaluation    Patient location during evaluation: PACU  Patient participation: complete - patient participated  Level of consciousness: awake  Pain score: 3  Airway patency: patent  Nausea & Vomiting: no nausea and no vomiting  Complications: no  Cardiovascular status: blood pressure returned to baseline  Respiratory status: acceptable  Hydration status: euvolemic

## 2020-01-10 NOTE — OP NOTE
[address]                  [address] [facility]  [facility]                                OPERATIVE REPORT    PATIENT NAME: Agnes Smith                   :        1940  MED REC NO:   [id number]                            ROOM:  ACCOUNT NO:   [de-identified]                           ADMIT DATE: 2020  PROVIDER:     Tracie Turk MD    DATE OF PROCEDURE:  2020    PREOPERATIVE DIAGNOSIS:  Spinal cord stimulator, implantable pulse  generator malpositioning. POSTOPERATIVE DIAGNOSIS:  Spinal cord stimulator, implantable pulse  generator malpositioning. OPERATIONS PERFORMED:  1. Exploration of left gluteal pocket for spinal cord stimulator  implantable pulse generator. 2.  Revision of the pocket. 3.  Replacement of implantable pulse generator and new pocket. ANESTHESIA:  Generalized endotracheal anesthesia. SURGEON:  Tracie Turk MD    ASSISTANT:  Marisabel Griffiths PA-C    COMPLICATIONS:  None. ESTIMATED BLOOD LOSS:  50 mL. SPECIMEN:  None. OPERATIVE INDICATIONS:  The patient is a 26-year-old lady who underwent  placement of spinal cord stimulator with left gluteal implantable pulse  generator. She noted that when she sat down, she was sitting right on  the implantable pulse generator, was causing her significant pain and it  was then determined that she would have revision of the pocket and  repositioning of the implantable pulse generator. After risks,  benefits, and alternatives were discussed with the patient, it was  determined that she would undergo the above-listed procedure. OPERATIVE PROCEDURE:  The patient was brought into the operating room. A timeout was performed where she was identified by her name, medical  record number, and the operative procedure which she was about to  undergo. Next, induction of generalized endotracheal anesthesia was  then commenced.   Upon completion of induction of generalized  endotracheal anesthesia, she received preoperative antibiotics. She was  then flipped into prone position on a Jono table. All pressure  points were padded. Thoracic, lumbar, and gluteal regions were prepped  and draped in the usual sterile fashion. Her previous incision for  implantable pulse generator was marked and it was prepped and draped in  the usual sterile fashion. Next, a #10 blade was used to make a skin  incision. I then used Metzenbaum scissors to then dissect through the  subcutaneous tissue. I then identified the wires from the spinal cord  stimulator that were going to the implantable pulse generator. I used  the Metzenbaum scissors to then dissect out the implantable pulse  generator. After this was done, I then explored the pocket. I  proceeded to then create a more superior pocket and I was then able to  turn the implantable pulse generator to 180 degrees and I was then able  to insert it into a more superior position pocket. I anchored it down  to the fascia using 2-0 silk suture. I then inspected the wound for any  evidence of bleeding. Adequate hemostasis was obtained with bipolar  cautery. I then irrigated the wound copiously with  antibiotic-impregnated saline and closed the wound in layers using 2-0  Vicryl for the subcutaneous layer and 4-0 Monocryl in a subcuticular  fashion for the skin. Dermabond was applied over the skin surface. A  dry sterile dressing was placed over this. The patient was then flipped  into supine position on her hospital bed, was extubated, and transported  to the postanesthesia care unit in stable condition. There were no  complications. Counts were correct. I was present for the entire case.         Anne Jiménez MD    D: 01/09/2020 10:38:39       T: 01/09/2020 19:33:42     [email or web address]  Job#: [id number]     Doc#: [id number]    CC:

## 2020-01-15 ENCOUNTER — OFFICE VISIT (OUTPATIENT)
Dept: PAIN MANAGEMENT | Age: 80
End: 2020-01-15
Payer: MEDICARE

## 2020-01-15 VITALS
SYSTOLIC BLOOD PRESSURE: 122 MMHG | DIASTOLIC BLOOD PRESSURE: 64 MMHG | TEMPERATURE: 97.7 F | OXYGEN SATURATION: 98 % | WEIGHT: 133 LBS | RESPIRATION RATE: 16 BRPM | HEIGHT: 63 IN | BODY MASS INDEX: 23.57 KG/M2 | HEART RATE: 72 BPM

## 2020-01-15 PROCEDURE — 99213 OFFICE O/P EST LOW 20 MIN: CPT | Performed by: PHYSICIAN ASSISTANT

## 2020-01-15 NOTE — PROGRESS NOTES
[address]  [address] [id number] [facility]    Follow up Note      Erlin Rubin     Date of Visit:  1/15/2020    CC:  Patient presents for follow up   Chief Complaint   Patient presents with    Follow-up     lt. lower back pain        HPI:    Pain is worse. Patient is post op day #6 s/p revision SCS. Patient reports feeling very uncomfortable when sitting and laying down. Appropriate analgesia with current medications regimen: no.    Change in quality of symptoms: no   Medication side effects:none. Recent diagnostic testing:none. Recent interventional procedures:none. She has been on anticoagulation medications to include Plavix. The patient  has not been on herbal supplements. The patient is not diabetic. Imaging:     Lumbar F/E films 2018 -   Posterior pedicle screw plates extend from S67 vertebral level to the   sacrum. Bony demineralization. Previous discectomy with loss of disc   space L1-2. Retrolisthesis L1-2 and anterolisthesis L4-5 S1. Multilevel disc space narrowing. No acute osseous finding. No   instability on flexion-extension views.      Lumbar MRI 2018 -   Posterior pedicle screw plates extend from J88 vertebral level to the   sacrum. Bony demineralization. Previous discectomy with loss of disc   space L1-2. Retrolisthesis L1-2 and anterolisthesis L4-5 S1. Multilevel disc space narrowing. No acute osseous finding.  No   instability on flexion-extension views.      Lumbar MRI 2016 -                   Potential Aberrant Drug-Related Behavior: no      Urine Drug Screenin2019 + hydrocodone and tramadol  2019 consistent  10/2019 consistent for hydrocodone and metabolite     OARRS report:  2019 consistent  2019 consistent  2019 consistent  2019 consistent  2019 consistent  2019 consistent  2019 consistent  2019 consistent  10/2019 consistent  2019 consistent  2019 consistent  2020 consistent (post op medication s/p surgery)       Past Medical History:   Diagnosis Date    Anxiety     CVA (cerebral vascular accident) ([address]     L FACIAL SENSATION    Depression     Diverticulitis     Hyperlipidemia     Hypertension     OA (osteoarthritis) of hip     OP (osteoporosis)     Shingles     R ABD    TIA (transient ischemic attack)     Tinnitus     Wears partial dentures     upper       Past Surgical History:   Procedure Laterality Date    BACK SURGERY      x3    CHOLECYSTECTOMY      COLONOSCOPY N/A 8/28/2019    COLONOSCOPY WITH BIOPSY performed by eMrcy Lewis MD at [facility] 9  8/28/2019    COLONOSCOPY POLYPECTOMY SNARE/COLD BIOPSY performed by Mercy Lewis MD at [address] [place] [address]      kuldip lense implant    HERNIA REPAIR  2018    HYSTERECTOMY      LUMBAR LAMINECTOMY      SPINAL CORD STIMULATOR IMPLANTATION N/A 5/6/2019    T8 SPINAL CORD STIMULATOR  TRIAL PLACEMENT--SUNITHA, KODAK TABLE, BOSTON SCIENTIFIC performed by Josee Yañez MD at [address] [place] [address] N/A 5/14/2019    PLACEMENT OF  PERMANENT SPINAL CORD STIMULATOR---XRAY, KODAK TABLE, BOSTON SCIENTIFIC performed by Josee Yañez MD at [address] [place] [address] N/A 1/9/2020    REVISION OF SPINAL CORD STIMULATOR  BATTERY AND POCKET performed by Josee Yañez MD at [address] N/A 8/28/2019    EGD BIOPSY performed by Mercy Lewis MD at [address] [place] [address]       Prior to Admission medications    Medication Sig Start Date End Date Taking? Authorizing Provider   HYDROcodone-acetaminophen (NORCO) 5-325 MG per tablet Take 1 tablet by mouth every 4 hours as needed for Pain for up to 7 days.  Intended supply: 30 days 1/9/20 1/16/20 Yes Henna Winn PA-C   clindamycin (CLEOCIN) 300 MG capsule Take 1 capsule by mouth 3 times daily for 7 days 1/9/20 1/16/20 Yes Henna Winn PA-C   senna (SENOKOT) 8.6 MG tablet Take 1 tablet by mouth 2 times daily 1/9/20 1/8/21 Yes Natasha Zaragoza PA-C   Handicap Placard MISC by Does not apply route Dx  Arthritis     5 yrs 12/16/19  Yes Uriel Colunga DO   carvedilol (COREG) 12.5 MG tablet Take 1 tablet by mouth 2 times daily (with meals) 10/28/19  Yes Uriel Colunga DO   hydrochlorothiazide (HYDRODIURIL) 25 MG tablet Take 1 tablet by mouth daily 10/28/19  Yes Uriel Colunga DO   losartan (COZAAR) 100 MG tablet Take 1 tablet by mouth daily 10/28/19  Yes Uriel Colunga DO   tiZANidine (ZANAFLEX) 4 MG tablet Take 1 tablet by mouth 3 times daily 10/28/19  Yes Uriel Colunga DO   citalopram (CELEXA) 10 MG tablet Take 1 tablet by mouth daily 10/28/19  Yes Uriel Colunga DO   clopidogrel (PLAVIX) 75 MG tablet Take 1 tablet by mouth daily 10/28/19  Yes Uriel Colunga DO   simvastatin (ZOCOR) 10 MG tablet Take 1 tablet by mouth daily 10/28/19  Yes Uriel Colunga DO   gabapentin (NEURONTIN) 300 MG capsule Take 1 capsule by mouth 3 times daily for 360 days. Patient taking differently: Take 300 mg by mouth 5 times daily.   10/28/19 10/22/20 Yes Uriel Colunga DO   dicyclomine (BENTYL) 10 MG capsule Take 1 capsule by mouth 4 times daily 8/13/19  Yes Kenny Augustin MD   sucralfate (CARAFATE) 1 GM tablet Take 1 tablet by mouth 4 times daily 8/6/19  Yes Uriel Colunga DO   pantoprazole (PROTONIX) 40 MG tablet TAKE 1 TABLET BY MOUTH ONCE DAILY ( TAKE B-12 1000MG AND D3 2000MG FOR GERD) 5/19/19  Yes Historical Provider, MD   vitamin B-12 (CYANOCOBALAMIN) 1000 MCG tablet Take 1,000 mcg by mouth daily   Yes Historical Provider, MD   vitamin D (D3-1000) 1000 units CAPS Take by mouth daily   Yes Historical Provider, MD       Allergies   Allergen Reactions    Pcn [Penicillins] Rash       Social History     Socioeconomic History    Marital status: Single     Spouse name: Not on file    Number of children: Not on file    Years of education: Not on file    Highest education level: Not on file   Occupational History    Not on file   Social Needs    Financial resource strain: Not on file    Food insecurity:     Worry: Not on file     Inability: Not on file    Transportation needs:     Medical: Not on file     Non-medical: Not on file   Tobacco Use    Smoking status: Never Smoker    Smokeless tobacco: Never Used   Substance and Sexual Activity    Alcohol use:  Yes     Alcohol/week: 1.0 standard drinks     Types: 1 Glasses of wine per week     Comment: rare    Drug use: No    Sexual activity: Not on file   Lifestyle    Physical activity:     Days per week: Not on file     Minutes per session: Not on file    Stress: Not on file   Relationships    Social connections:     Talks on phone: Not on file     Gets together: Not on file     Attends Restorationism service: Not on file     Active member of club or organization: Not on file     Attends meetings of clubs or organizations: Not on file     Relationship status: Not on file    Intimate partner violence:     Fear of current or ex partner: Not on file     Emotionally abused: Not on file     Physically abused: Not on file     Forced sexual activity: Not on file   Other Topics Concern    Not on file   Social History Narrative        CATARACTS    TINNITUS    GB OUT     [address] [facility]  [address]     CVA---F  52 GF  FROM CVA AT 46    FH HTN BRO    CATH NEG     OP    TICS    BOY[address]RIEND [address]  1940 [address] #2    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 BRADLEY---[employer] RAY    CH--5    RETIRED [employer] THEN WORKS Lost [employer]    CVA AFFECT L FACIAL SENSATION 2-08    TOMMY LUMBAR DISC OR ROB-----1-09    COLON 1-10----NEG BUT PREVIOUS TICS AND POLYP    TIA 1-11    SHINGLES R     MRI  WITH L-2-3 DISC AND NERVE----DR RIVAS----OR SET UP    ADMIT  WITH TIA----DR Mirza Baltazar DC ASA AND ADDED PLAVIX TO THE PERSANTINE    LUMBAR CHERYL OR DR RIVAS 10-12------------AGAIN DONE SEAMUS RIVAS     MOTHER  SUICIDE AGE 42    DAD  sitting     Extremities:    Tremors:None bilaterally upper and lower  Range of motion:Generally normal shoulders, pain with internal rotation of hips negative. Intact:Yes  Varicose veins:absent   Pulses:radial pulse 2+ left  Cyanosis:none  Edema:Normal  Left buttock with moderate swelling and ecchymosis. Incision without erythema or drainage. Neurological:    Sensory:normal to light touch   Motor:   Right Quadriceps5/5          Left Quadriceps4/5           Right Gastrocnemius5/5    Left Gastrocnemius4/5  Right Ant Tibialis5/5  Left Ant Tibialis5/5  [address]    Dermatology:    Skin:no unusual rashes, no skin lesions, no palpable subcutaneous nodules and good skin turgor    Impression:    LBP and RLE pain in L5 distribution with diffuse weakness of the LLE  Multiple prior lumbar fusion surgeries with Dr. Migdalia Isidro, most recent 9/2016 extending her fusion from T10-sacrum  On PLAVIX and ASA for Hx TIA's/CVA's  Has failed multiple surgeries, injections, PT, and medications  Had staged trial with Dr. Shannan Quezada 5/6/2019 and 5/14/2019, has had some improvement in her chronic pain thus far but does need a reprogramming, prior reprogramming has been helpful until she seems to lose the stimulation in the needed areas.                  Patient is s/p:  OPERATIONS PERFORMED:               1.  Exploration of left gluteal pocket for spinal cord stimulator                implantable pulse generator. 2.  Revision of the pocket. 3.  Replacement of implantable pulse generator and new        Pocket on 01/09/2020      OARRS reviewed 01/2020  Hold off on new script for Norco 5/325 BID #60, plan to wean off if able but for now not able to wean any further. Patient is still requiring higher dosing s/p surgery. She can call when she may return to her regular dose. She can call for refill if within the next 3-4 weeks as she was seen today and not given a script.   .    Continue gabapentin as currently

## 2020-01-18 RX ORDER — LOSARTAN POTASSIUM 100 MG/1
100 TABLET ORAL DAILY
Qty: 90 TABLET | Refills: 3 | Status: SHIPPED
Start: 2020-01-18 | End: 2020-02-12 | Stop reason: SDUPTHER

## 2020-02-02 RX ORDER — CITALOPRAM 10 MG/1
10 TABLET ORAL DAILY
Qty: 90 TABLET | Refills: 3 | Status: SHIPPED
Start: 2020-02-02 | End: 2021-02-23 | Stop reason: SDUPTHER

## 2020-02-03 ENCOUNTER — OFFICE VISIT (OUTPATIENT)
Dept: NEUROSURGERY | Age: 80
End: 2020-02-03

## 2020-02-03 PROCEDURE — 99024 POSTOP FOLLOW-UP VISIT: CPT | Performed by: PHYSICIAN ASSISTANT

## 2020-02-03 RX ORDER — METHYLPREDNISOLONE 4 MG/1
TABLET ORAL
Qty: 1 KIT | Refills: 0 | Status: SHIPPED | OUTPATIENT
Start: 2020-02-03 | End: 2020-02-09

## 2020-02-03 RX ORDER — HYDROCODONE BITARTRATE AND ACETAMINOPHEN 5; 325 MG/1; MG/1
1 TABLET ORAL EVERY 4 HOURS PRN
Qty: 42 TABLET | Refills: 0 | Status: SHIPPED
Start: 2020-02-03 | End: 2020-02-24 | Stop reason: SDUPTHER

## 2020-02-03 NOTE — PATIENT INSTRUCTIONS

## 2020-02-12 ENCOUNTER — OFFICE VISIT (OUTPATIENT)
Dept: PRIMARY CARE CLINIC | Age: 80
End: 2020-02-12
Payer: MEDICARE

## 2020-02-12 ENCOUNTER — HOSPITAL ENCOUNTER (OUTPATIENT)
Age: 80
Discharge: HOME | End: 2020-02-14
Payer: MEDICARE

## 2020-02-12 VITALS
SYSTOLIC BLOOD PRESSURE: 122 MMHG | DIASTOLIC BLOOD PRESSURE: 60 MMHG | WEIGHT: 133 LBS | BODY MASS INDEX: 23.56 KG/M2 | TEMPERATURE: 98.3 F

## 2020-02-12 PROBLEM — I65.23 BILATERAL CAROTID ARTERY STENOSIS: Status: ACTIVE | Noted: 2020-02-12

## 2020-02-12 PROBLEM — I10 ESSENTIAL HYPERTENSION: Status: ACTIVE | Noted: 2020-02-12

## 2020-02-12 PROBLEM — I73.00 RAYNAUD'S DISEASE WITHOUT GANGRENE: Status: ACTIVE | Noted: 2020-02-12

## 2020-02-12 LAB
ALBUMIN SERPL-MCNC: 4.3 G/DL (ref 3.5–5.2)
ALP BLD-CCNC: 76 U/L (ref 35–104)
ALT SERPL-CCNC: 21 U/L (ref 0–32)
ANION GAP SERPL CALCULATED.3IONS-SCNC: 16 MMOL/L (ref 7–16)
AST SERPL-CCNC: 21 U/L (ref 0–31)
BACTERIA: ABNORMAL /HPF
BASOPHILS ABSOLUTE: 0.06 E9/L (ref 0–0.2)
BASOPHILS RELATIVE PERCENT: 0.6 % (ref 0–2)
BILIRUB SERPL-MCNC: 0.5 MG/DL (ref 0–1.2)
BILIRUBIN URINE: NEGATIVE
BLOOD, URINE: ABNORMAL
BUN BLDV-MCNC: 17 MG/DL (ref 8–23)
CALCIUM SERPL-MCNC: 9.2 MG/DL (ref 8.6–10.2)
CHLORIDE BLD-SCNC: 91 MMOL/L (ref 98–107)
CHOLESTEROL, TOTAL: 129 MG/DL (ref 0–199)
CLARITY: CLEAR
CO2: 25 MMOL/L (ref 22–29)
COLOR: YELLOW
CREAT SERPL-MCNC: 0.8 MG/DL (ref 0.5–1)
EOSINOPHILS ABSOLUTE: 0.25 E9/L (ref 0.05–0.5)
EOSINOPHILS RELATIVE PERCENT: 2.7 % (ref 0–6)
GFR AFRICAN AMERICAN: >60
GFR NON-AFRICAN AMERICAN: >60 ML/MIN/1.73
GLUCOSE BLD-MCNC: 116 MG/DL (ref 74–99)
GLUCOSE URINE: NEGATIVE MG/DL
HBA1C MFR BLD: 5.5 % (ref 4–5.6)
HCT VFR BLD CALC: 37.9 % (ref 34–48)
HDLC SERPL-MCNC: 50 MG/DL
HEMOGLOBIN: 11.9 G/DL (ref 11.5–15.5)
IMMATURE GRANULOCYTES #: 0.06 E9/L
IMMATURE GRANULOCYTES %: 0.6 % (ref 0–5)
KETONES, URINE: NEGATIVE MG/DL
LDL CHOLESTEROL CALCULATED: 55 MG/DL (ref 0–99)
LEUKOCYTE ESTERASE, URINE: NEGATIVE
LYMPHOCYTES ABSOLUTE: 1.81 E9/L (ref 1.5–4)
LYMPHOCYTES RELATIVE PERCENT: 19.2 % (ref 20–42)
MCH RBC QN AUTO: 31.4 PG (ref 26–35)
MCHC RBC AUTO-ENTMCNC: 31.4 % (ref 32–34.5)
MCV RBC AUTO: 100 FL (ref 80–99.9)
MONOCYTES ABSOLUTE: 1.42 E9/L (ref 0.1–0.95)
MONOCYTES RELATIVE PERCENT: 15.1 % (ref 2–12)
NEUTROPHILS ABSOLUTE: 5.82 E9/L (ref 1.8–7.3)
NEUTROPHILS RELATIVE PERCENT: 61.8 % (ref 43–80)
NITRITE, URINE: NEGATIVE
PDW BLD-RTO: 12.6 FL (ref 11.5–15)
PH UA: 7 (ref 5–9)
PLATELET # BLD: 314 E9/L (ref 130–450)
PMV BLD AUTO: 9.5 FL (ref 7–12)
POTASSIUM SERPL-SCNC: 3.7 MMOL/L (ref 3.5–5)
PROTEIN UA: NEGATIVE MG/DL
RBC # BLD: 3.79 E12/L (ref 3.5–5.5)
RBC UA: ABNORMAL /HPF (ref 0–2)
SODIUM BLD-SCNC: 132 MMOL/L (ref 132–146)
SPECIFIC GRAVITY UA: 1.01 (ref 1–1.03)
T4 TOTAL: 5.9 MCG/DL (ref 4.5–11.7)
TOTAL PROTEIN: 6.9 G/DL (ref 6.4–8.3)
TRIGL SERPL-MCNC: 118 MG/DL (ref 0–149)
TSH SERPL DL<=0.05 MIU/L-ACNC: 1.68 UIU/ML (ref 0.27–4.2)
UROBILINOGEN, URINE: 0.2 E.U./DL
VITAMIN B-12: >2000 PG/ML (ref 211–946)
VITAMIN D 25-HYDROXY: 54 NG/ML (ref 30–100)
VLDLC SERPL CALC-MCNC: 24 MG/DL
WBC # BLD: 9.4 E9/L (ref 4.5–11.5)
WBC UA: ABNORMAL /HPF (ref 0–5)

## 2020-02-12 PROCEDURE — 85025 COMPLETE CBC W/AUTO DIFF WBC: CPT

## 2020-02-12 PROCEDURE — 82607 VITAMIN B-12: CPT

## 2020-02-12 PROCEDURE — 84436 ASSAY OF TOTAL THYROXINE: CPT

## 2020-02-12 PROCEDURE — 80061 LIPID PANEL: CPT

## 2020-02-12 PROCEDURE — 82306 VITAMIN D 25 HYDROXY: CPT

## 2020-02-12 PROCEDURE — 99214 OFFICE O/P EST MOD 30 MIN: CPT | Performed by: FAMILY MEDICINE

## 2020-02-12 PROCEDURE — 81001 URINALYSIS AUTO W/SCOPE: CPT

## 2020-02-12 PROCEDURE — 83036 HEMOGLOBIN GLYCOSYLATED A1C: CPT

## 2020-02-12 PROCEDURE — 84443 ASSAY THYROID STIM HORMONE: CPT

## 2020-02-12 PROCEDURE — 36415 COLL VENOUS BLD VENIPUNCTURE: CPT

## 2020-02-12 PROCEDURE — 80053 COMPREHEN METABOLIC PANEL: CPT

## 2020-02-12 RX ORDER — LOSARTAN POTASSIUM 50 MG/1
50 TABLET ORAL DAILY
Qty: 90 TABLET | Refills: 5 | Status: SHIPPED
Start: 2020-02-12 | End: 2021-04-27 | Stop reason: SDUPTHER

## 2020-02-12 ASSESSMENT — ENCOUNTER SYMPTOMS
BACK PAIN: 1
EYES NEGATIVE: 1
ALLERGIC/IMMUNOLOGIC NEGATIVE: 1
RESPIRATORY NEGATIVE: 1
GASTROINTESTINAL NEGATIVE: 1

## 2020-02-12 ASSESSMENT — PATIENT HEALTH QUESTIONNAIRE - PHQ9
SUM OF ALL RESPONSES TO PHQ QUESTIONS 1-9: 0
1. LITTLE INTEREST OR PLEASURE IN DOING THINGS: 0
2. FEELING DOWN, DEPRESSED OR HOPELESS: 0
SUM OF ALL RESPONSES TO PHQ9 QUESTIONS 1 & 2: 0
SUM OF ALL RESPONSES TO PHQ QUESTIONS 1-9: 0

## 2020-02-12 NOTE — PROGRESS NOTES
COLONOSCOPY  8/28/2019    COLONOSCOPY POLYPECTOMY SNARE/COLD BIOPSY performed by Mercy Lewis MD at [address]      kuldip lense implant    HERNIA REPAIR  2018    HYSTERECTOMY      LUMBAR LAMINECTOMY      SPINAL CORD STIMULATOR IMPLANTATION N/A 5/6/2019    T8 SPINAL CORD STIMULATOR  TRIAL PLACEMENT--SUNITHA, KODAK TABLE, BOSTON SCIENTIFIC performed by Josee Yañez MD at [address] [place] [address] 5/14/2019    PLACEMENT OF  PERMANENT SPINAL CORD STIMULATOR---XRAY, KODAK TABLE, BOSTON SCIENTIFIC performed by Josee Yañez MD at [address] [place] [address] N/A 1/9/2020    REVISION OF SPINAL CORD STIMULATOR  BATTERY AND POCKET performed by Josee Yañez MD at [facility] N/A 8/28/2019    EGD BIOPSY performed by Mercy Lewis MD at [address]:    02/12/20 0758   BP: 122/60   Temp: 98.3 °F (36.8 °C)   Weight: 133 lb (60.3 kg)       Objective:    Physical Exam  Vitals signs reviewed. Constitutional:       Appearance: She is well-developed. HENT:      Head: Normocephalic. Eyes:      Pupils: Pupils are equal, round, and reactive to light. Neck:      Musculoskeletal: Normal range of motion. Cardiovascular:      Rate and Rhythm: Normal rate and regular rhythm. Pulmonary:      Effort: Pulmonary effort is normal.      Breath sounds: Normal breath sounds. Abdominal:      Palpations: Abdomen is soft. Musculoskeletal:      Comments: Tender with palp   Para lubmar  Area      Device intact with no pain with palp   Skin:     General: Skin is warm. Neurological:      Mental Status: She is alert and oriented to person, place, and time. Psychiatric:         Behavior: Behavior normal.         Rosio was seen today for dizziness.     Diagnoses and all orders for this visit:    Essential hypertension    Strain of lumbar region, initial encounter    Contusion of buttock, initial encounter  -     XR LUMBAR SPINE (2-3 VIEWS); Future  -     XR PELVIS (1-2 VIEWS); Future  -     XR THORACIC SPINE (3 VIEWS); Future    Raynaud's disease without gangrene    Bilateral carotid artery stenosis  -     US CAROTID ARTERY BILATERAL; Future    Other orders  -     losartan (COZAAR) 50 MG tablet; Take 1 tablet by mouth daily        Comments: dec losartan 50 mg qd   May dec  hctz later       Us carotid    X ray   Spine    A great deal of time spent reviewing medications, diet, exercise, social issues. Also reviewing the chart before entering the room with patient and finishing charting after leaving patient's room. More than half of that time was spent face to face with the patient in counseling and coordinating care. Follow Up: Return in about 2 weeks (around 2/26/2020).      Seen by:  Guru Renteria DO

## 2020-02-13 ENCOUNTER — TELEPHONE (OUTPATIENT)
Dept: PRIMARY CARE CLINIC | Age: 80
End: 2020-02-13

## 2020-02-13 NOTE — TELEPHONE ENCOUNTER
Patient know that her laboratory studies were good and the x-ray did not show any fractures of the vertebrae or pelvis

## 2020-02-17 ENCOUNTER — OFFICE VISIT (OUTPATIENT)
Dept: NEUROSURGERY | Age: 80
End: 2020-02-17

## 2020-02-17 VITALS
TEMPERATURE: 97.6 F | BODY MASS INDEX: 23.57 KG/M2 | DIASTOLIC BLOOD PRESSURE: 80 MMHG | HEART RATE: 70 BPM | SYSTOLIC BLOOD PRESSURE: 124 MMHG | WEIGHT: 133 LBS | HEIGHT: 63 IN

## 2020-02-17 PROCEDURE — 99024 POSTOP FOLLOW-UP VISIT: CPT | Performed by: PHYSICIAN ASSISTANT

## 2020-02-24 RX ORDER — HYDROCODONE BITARTRATE AND ACETAMINOPHEN 5; 325 MG/1; MG/1
1 TABLET ORAL EVERY 4 HOURS PRN
Qty: 42 TABLET | Refills: 0 | Status: SHIPPED | OUTPATIENT
Start: 2020-02-24 | End: 2020-03-02

## 2020-02-24 NOTE — TELEPHONE ENCOUNTER
Syed Ricardo called in stating she is still having a lot of pain. She would like Norco called into [facility].  Pt# [phone]    Patrick Nelson)

## 2020-02-26 ENCOUNTER — OFFICE VISIT (OUTPATIENT)
Dept: PRIMARY CARE CLINIC | Age: 80
End: 2020-02-26
Payer: MEDICARE

## 2020-02-26 PROCEDURE — 99214 OFFICE O/P EST MOD 30 MIN: CPT | Performed by: FAMILY MEDICINE

## 2020-02-26 RX ORDER — SIMVASTATIN 10 MG
10 TABLET ORAL DAILY
Qty: 90 TABLET | Refills: 3 | Status: SHIPPED
Start: 2020-02-26 | End: 2020-11-30 | Stop reason: SDUPTHER

## 2020-02-26 NOTE — PROGRESS NOTES
20  Name: Alisa Velasquez    : 1940    Sex: female    Age: 78 y.o. Subjective:  Chief Complaint: Patient is here for  Two week ck  Re   Lab and    back     bbck sigifredo after   Surgery moved the    Box on buttock   strss with  BF  knee or  So w ith lung  Ca  Bro with fx hip  Lab noted        Review of Systems   Constitutional: Negative. HENT: Negative. Eyes: Negative. Respiratory: Negative. Cardiovascular: Negative. Gastrointestinal: Negative. Endocrine: Negative. Genitourinary: Negative. Musculoskeletal: Positive for arthralgias and back pain. Skin: Negative. Allergic/Immunologic: Negative. Neurological: Negative. Hematological: Negative. Psychiatric/Behavioral: Negative. Current Outpatient Medications:     simvastatin (ZOCOR) 10 MG tablet, Take 1 tablet by mouth daily, Disp: 90 tablet, Rfl: 3    HYDROcodone-acetaminophen (NORCO) 5-325 MG per tablet, Take 1 tablet by mouth every 4 hours as needed for Pain for up to 7 days. Intended supply: 30 days, Disp: 42 tablet, Rfl: 0    losartan (COZAAR) 50 MG tablet, Take 1 tablet by mouth daily, Disp: 90 tablet, Rfl: 5    citalopram (CELEXA) 10 MG tablet, Take 1 tablet by mouth daily, Disp: 90 tablet, Rfl: 3    senna (SENOKOT) 8.6 MG tablet, Take 1 tablet by mouth 2 times daily, Disp: 60 tablet, Rfl: 0    Handicap Placard MISC, by Does not apply route Dx  Arthritis     5 yrs, Disp: 1 each, Rfl: 0    carvedilol (COREG) 12.5 MG tablet, Take 1 tablet by mouth 2 times daily (with meals), Disp: 180 tablet, Rfl: 3    hydrochlorothiazide (HYDRODIURIL) 25 MG tablet, Take 1 tablet by mouth daily, Disp: 90 tablet, Rfl: 3    tiZANidine (ZANAFLEX) 4 MG tablet, Take 1 tablet by mouth 3 times daily, Disp: 270 tablet, Rfl: 3    clopidogrel (PLAVIX) 75 MG tablet, Take 1 tablet by mouth daily, Disp: 90 tablet, Rfl: 3    gabapentin (NEURONTIN) 300 MG capsule, Take 1 capsule by mouth 3 times daily for 360 days. (Patient taking differently: Take 300 mg by mouth 5 times daily. ), Disp: 270 capsule, Rfl: 3    dicyclomine (BENTYL) 10 MG capsule, Take 1 capsule by mouth 4 times daily, Disp: 120 capsule, Rfl: 3    sucralfate (CARAFATE) 1 GM tablet, Take 1 tablet by mouth 4 times daily, Disp: 120 tablet, Rfl: 3    pantoprazole (PROTONIX) 40 MG tablet, TAKE 1 TABLET BY MOUTH ONCE DAILY ( TAKE B-12 1000MG AND D3 2000MG FOR GERD), Disp: , Rfl: 12    vitamin B-12 (CYANOCOBALAMIN) 1000 MCG tablet, Take 1,000 mcg by mouth daily, Disp: , Rfl:     vitamin D (D3-1000) 1000 units CAPS, Take by mouth daily, Disp: , Rfl:   Allergies   Allergen Reactions    Pcn [Penicillins] Rash     Social History     Socioeconomic History    Marital status: Single     Spouse name: Not on file    Number of children: Not on file    Years of education: Not on file    Highest education level: Not on file   Occupational History    Not on file   Social Needs    Financial resource strain: Not on file    Food insecurity:     Worry: Not on file     Inability: Not on file    Transportation needs:     Medical: Not on file     Non-medical: Not on file   Tobacco Use    Smoking status: Never Smoker    Smokeless tobacco: Never Used   Substance and Sexual Activity    Alcohol use:  Yes     Alcohol/week: 1.0 standard drinks     Types: 1 Glasses of wine per week     Comment: rare    Drug use: No    Sexual activity: Not on file   Lifestyle    Physical activity:     Days per week: Not on file     Minutes per session: Not on file    Stress: Not on file   Relationships    Social connections:     Talks on phone: Not on file     Gets together: Not on file     Attends Denominational service: Not on file     Active member of club or organization: Not on file     Attends meetings of clubs or organizations: Not on file     Relationship status: Not on file    Intimate partner violence:     Fear of current or ex partner: Not on file     Emotionally abused: Not on file Physically abused: Not on file     Forced sexual activity: Not on file   Other Topics Concern    Not on file   Social History Narrative        CATARACTS    TINNITUS    GB OUT     C [facility] AT 43 FROM SUICIDE    FH CVA---F  52 GF  FROM CVA AT 46    FH HTN BRO    CATH NEG     OP    TICS    BOYFRIEND NHAN Sanders  1940 Page #2    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 BRADLEY---CAVALEIR X RAY    CH--5   Girl struthers   Two boys kinsman    Two out of town    [facility] [address]    CVA AFFECT L FACIAL SENSATION     [facility] OR ROB-----1-    COLON 1-10----NEG BUT PREVIOUS TICS AND POLYP    TIA     SHINGLES R ABD     MRI  WITH L-2-3 DISC AND NERVE----DR RIVAS----OR SET UP    ADMIT  WITH TIA----DR Lyn Kidd DC ASA AND ADDED PLAVIX TO THE PERSANTINE    LUMBAR CHERYL OR DR RIVAS 10-12------------AGAIN DONE SEAMUS RIVAS 16    MOTHER  SUICIDE AGE 43    DAD  AT 52 CVA    FIRST HUS SUICIDE AT 46    X RAY 11=13 WITH NEW L-1 -2 NARROWING AND RETROLISTHESIS----SHOTS WITH DR Iglesias Records    ANX--DEP 1-14    OA HIPS DR CHARLTON---    DIET DM 4=16    LUMBAR DISC SURGERY 16 DR RIVAS    LEFT INGUINAL HERNIA WITH MESH DR BUNCH     LUCIO EVLAURA BROTHERS     EVAL DR ALFONSO [place]--- ADIVSED ENT---PT SAYS HE TOLD HER NOT GET NEBULZIER---AND    REFUSESD FOLLOW UP    T-8 SPINAL CORD STIMULATOR TRIAL 19--------implanted permanent dr Mike Barber  ---device moved on buttock   1-20    PAIN MEDS WITH DR Yousif Prime PAIN [address]    Son with  Lung ca and brain mets  2-20----- [place]      Past Medical History:   Diagnosis Date    Anxiety     CVA (cerebral vascular accident) ([address]     L FACIAL SENSATION    Depression     Diverticulitis     Hyperlipidemia     Hypertension     OA (osteoarthritis) of hip     OP (osteoporosis)     Shingles     R ABD    TIA (transient ischemic attack)     Tinnitus     Wears partial dentures upper     No family history on file. Past Surgical History:   Procedure Laterality Date    BACK SURGERY      x3    CHOLECYSTECTOMY      COLONOSCOPY N/A 8/28/2019    COLONOSCOPY WITH BIOPSY performed by Comfort Armenta MD at [address] COLONOSCOPY  8/28/2019    COLONOSCOPY POLYPECTOMY SNARE/COLD BIOPSY performed by Comfort Armenta MD at [address]      kuldip lense implant    HERNIA REPAIR  2018    HYSTERECTOMY      LUMBAR LAMINECTOMY      SPINAL CORD STIMULATOR IMPLANTATION N/A 5/6/2019    T8 SPINAL CORD STIMULATOR  TRIAL PLACEMENT--SUNITHA, KODAK TABLE, BOSTON SCIENTIFIC performed by Jones Lai MD at [address] [place] [address] 5/14/2019    PLACEMENT OF  PERMANENT SPINAL CORD STIMULATOR---XRAY, KODAK TABLE, BOSTON SCIENTIFIC performed by Jones Lai MD at [address] [place] [address] N/A 1/9/2020    REVISION OF SPINAL CORD STIMULATOR  BATTERY AND POCKET performed by Jones Lai MD at [address] N/A 8/28/2019    EGD BIOPSY performed by Comfort Armenta MD at [address]:    02/26/20 1102   BP: 128/82   Pulse: 62   Resp: 16   Temp: 98.3 °F (36.8 °C)   SpO2: 97%   Weight: 134 lb (60.8 kg)   Height: 5' 3\" (1.6 m)       Objective:    Physical Exam  Vitals signs reviewed. Constitutional:       Appearance: She is well-developed. HENT:      Head: Normocephalic. Eyes:      Pupils: Pupils are equal, round, and reactive to light. Neck:      Musculoskeletal: Normal range of motion. Cardiovascular:      Rate and Rhythm: Normal rate and regular rhythm. Pulmonary:      Effort: Pulmonary effort is normal.      Breath sounds: Normal breath sounds. Abdominal:      Palpations: Abdomen is soft. Musculoskeletal:      Comments: Marked decreased range of motion lumbar spine. Buttocks the face is stable with no erythema or pain with palpation   Skin:     General: Skin is warm.    Neurological:

## 2020-02-27 VITALS
DIASTOLIC BLOOD PRESSURE: 82 MMHG | SYSTOLIC BLOOD PRESSURE: 128 MMHG | TEMPERATURE: 98.3 F | WEIGHT: 134 LBS | BODY MASS INDEX: 23.74 KG/M2 | RESPIRATION RATE: 16 BRPM | OXYGEN SATURATION: 97 % | HEART RATE: 62 BPM | HEIGHT: 63 IN

## 2020-02-27 PROBLEM — M15.0 PRIMARY OSTEOARTHRITIS INVOLVING MULTIPLE JOINTS: Status: ACTIVE | Noted: 2020-02-27

## 2020-02-27 PROBLEM — M15.9 PRIMARY OSTEOARTHRITIS INVOLVING MULTIPLE JOINTS: Status: ACTIVE | Noted: 2020-02-27

## 2020-02-27 PROBLEM — M15.0 PRIMARY OSTEOARTHRITIS INVOLVING MULTIPLE JOINTS: Chronic | Status: ACTIVE | Noted: 2020-02-27

## 2020-02-27 PROBLEM — I10 ESSENTIAL HYPERTENSION: Chronic | Status: ACTIVE | Noted: 2020-02-12

## 2020-02-27 PROBLEM — I73.00 RAYNAUD'S DISEASE WITHOUT GANGRENE: Chronic | Status: ACTIVE | Noted: 2020-02-12

## 2020-02-27 PROBLEM — I65.23 BILATERAL CAROTID ARTERY STENOSIS: Chronic | Status: ACTIVE | Noted: 2020-02-12

## 2020-02-27 PROBLEM — M15.9 PRIMARY OSTEOARTHRITIS INVOLVING MULTIPLE JOINTS: Chronic | Status: ACTIVE | Noted: 2020-02-27

## 2020-02-27 ASSESSMENT — ENCOUNTER SYMPTOMS
RESPIRATORY NEGATIVE: 1
ALLERGIC/IMMUNOLOGIC NEGATIVE: 1
BACK PAIN: 1
GASTROINTESTINAL NEGATIVE: 1
EYES NEGATIVE: 1

## 2020-04-17 ENCOUNTER — VIRTUAL VISIT (OUTPATIENT)
Dept: PAIN MANAGEMENT | Age: 80
End: 2020-04-17
Payer: MEDICARE

## 2020-04-17 PROCEDURE — 99442 PR PHYS/QHP TELEPHONE EVALUATION 11-20 MIN: CPT | Performed by: PHYSICIAN ASSISTANT

## 2020-04-17 RX ORDER — HYDROCODONE BITARTRATE AND ACETAMINOPHEN 5; 325 MG/1; MG/1
1 TABLET ORAL PRN
COMMUNITY
End: 2020-04-17

## 2020-04-17 RX ORDER — HYDROCODONE BITARTRATE AND ACETAMINOPHEN 5; 325 MG/1; MG/1
1 TABLET ORAL 2 TIMES DAILY PRN
Qty: 60 TABLET | Refills: 0 | Status: SHIPPED
Start: 2020-04-17 | End: 2020-06-11 | Stop reason: SDUPTHER

## 2020-04-17 NOTE — PROGRESS NOTES
[address]  [address] [facility], [address] [place] [address] Johny  [phone]  Telephone follow up visit      Date of Visit:  4/17/2020    CC: Follow Up    Consent:  Telephone follow up due to Kala 19 pandemic   The patient and/or health care decision maker is aware that he/she may receive a bill for this telephone service, depending on his insurance coverage, and has provided verbal consent to proceed: Yes    I have considered the risks of abuse, dependence, addiction and diversion. My patient is aware that they will need a follow-up visit (in-person or virtually) at the appropriate time indicated for continued medications. Further, my patient is aware that when this acute crisis has lifted, they will be expected to return for an in-person visit and all elements of standard local and hospital guidelines in order to continue this medication. Patient location: Home   Provider Location:Home     HPI:  Pain is unchanged. Patient reports pain in her buttock. Reports that it is difficult to sleep. Appropriate analgesia with current medications regimen: yes. Change in quality of symptoms:no. Medication side effects:none. Recent diagnostic testing:none. Recent interventional procedures:none. She has been on anticoagulation medications to include Plavix. The patient  has not been on herbal supplements. The patient is not diabetic.     Imaging:     Lumbar F/E films 12/2018 -   Posterior pedicle screw plates extend from R72 vertebral level to the   sacrum. Bony demineralization. Previous discectomy with loss of disc   space L1-2. Retrolisthesis L1-2 and anterolisthesis L4-5 S1. Multilevel disc space narrowing. No acute osseous finding. No   instability on flexion-extension views.      Lumbar MRI 12/2018 -   Posterior pedicle screw plates extend from N72 vertebral level to the   sacrum. Bony demineralization. Previous discectomy with loss of disc   space L1-2.  Retrolisthesis Pocket on 01/09/2020      [place] reviewed 04/2020  Continue Norco 5/325 BID #60, plan to wean off if able but for now not able to wean any further. Having difficulty sleeping due to pain. Continue gabapentin as currently prescribed from outside provider, TID  Continue off tizanidine if able, can take prn if necessary.  Has refills.    Compounding pain cream not helpful  Continue aquatic PT - goes sometimes at [facility]. On hold due pandemic  Patient encouraged to stay active  Treatment plan discussed with the patient including medication side effects     Controlled Substance Monitoring:    Acute and Chronic Pain Monitoring:   RX Monitoring 4/17/2020   Attestation -   Periodic Controlled Substance Monitoring Possible medication side effects, risk of tolerance/dependence & alternative treatments discussed. ;No signs of potential drug abuse or diversion identified. ;Assessed functional status. ;Obtaining appropriate analgesic effect of treatment.                We discussed with the patient that combining opioids, benzodiazepines, alcohol, illicit drugs or sleep aids increases the risk of respiratory depression including death. We discussed that these medications may cause drowsiness, sedation or dizziness and have counseled the patient not to drive or operate machinery. We have discussed that these medications will be prescribed only by one provider. We have discussed with the patient about age related risk factors and have thoroughly discussed the importance of taking these medications as prescribed. The patient verbalizes understanding. Patient advised regarding steps to help prevent the spread of COVID-19   SOURCE - https://mery-alvin.info/. html     1-Stay home except to get medical care  2-Clean your hands often for atleast 20 secnds, avoid touching: Avoid touching your eyes, nose, and mouth with unwashed hands.   3-Seek medical attention: Seek prompt medical attention if your

## 2020-06-11 ENCOUNTER — OFFICE VISIT (OUTPATIENT)
Dept: PAIN MANAGEMENT | Age: 80
End: 2020-06-11
Payer: MEDICARE

## 2020-06-11 VITALS
HEIGHT: 63 IN | OXYGEN SATURATION: 99 % | RESPIRATION RATE: 16 BRPM | WEIGHT: 140 LBS | TEMPERATURE: 98.7 F | SYSTOLIC BLOOD PRESSURE: 126 MMHG | DIASTOLIC BLOOD PRESSURE: 70 MMHG | HEART RATE: 63 BPM | BODY MASS INDEX: 24.8 KG/M2

## 2020-06-11 PROCEDURE — 99213 OFFICE O/P EST LOW 20 MIN: CPT | Performed by: PHYSICIAN ASSISTANT

## 2020-06-11 RX ORDER — HYDROCODONE BITARTRATE AND ACETAMINOPHEN 5; 325 MG/1; MG/1
1 TABLET ORAL 2 TIMES DAILY PRN
Qty: 60 TABLET | Refills: 0 | Status: SHIPPED
Start: 2020-06-11 | End: 2020-07-16 | Stop reason: SDUPTHER

## 2020-06-18 RX ORDER — TIZANIDINE 4 MG/1
4 TABLET ORAL 3 TIMES DAILY
Qty: 270 TABLET | Refills: 3 | Status: SHIPPED
Start: 2020-06-18 | End: 2021-11-12

## 2020-07-16 ENCOUNTER — HOSPITAL ENCOUNTER (OUTPATIENT)
Age: 80
Discharge: HOME | End: 2020-07-18
Payer: MEDICARE

## 2020-07-16 ENCOUNTER — OFFICE VISIT (OUTPATIENT)
Dept: PAIN MANAGEMENT | Age: 80
End: 2020-07-16
Payer: MEDICARE

## 2020-07-16 VITALS
WEIGHT: 141 LBS | SYSTOLIC BLOOD PRESSURE: 179 MMHG | HEART RATE: 68 BPM | DIASTOLIC BLOOD PRESSURE: 90 MMHG | RESPIRATION RATE: 16 BRPM | TEMPERATURE: 97.6 F | OXYGEN SATURATION: 98 % | HEIGHT: 64 IN | BODY MASS INDEX: 24.07 KG/M2

## 2020-07-16 LAB — SPECIFIC GRAVITY UA: 1.01 (ref 1–1.03)

## 2020-07-16 PROCEDURE — 80307 DRUG TEST PRSMV CHEM ANLYZR: CPT

## 2020-07-16 PROCEDURE — G0480 DRUG TEST DEF 1-7 CLASSES: HCPCS

## 2020-07-16 PROCEDURE — 99213 OFFICE O/P EST LOW 20 MIN: CPT | Performed by: PHYSICIAN ASSISTANT

## 2020-07-16 PROCEDURE — 99213 OFFICE O/P EST LOW 20 MIN: CPT

## 2020-07-16 RX ORDER — HYDROCODONE BITARTRATE AND ACETAMINOPHEN 5; 325 MG/1; MG/1
1 TABLET ORAL 2 TIMES DAILY PRN
Qty: 60 TABLET | Refills: 0 | Status: SHIPPED
Start: 2020-07-16 | End: 2020-08-13 | Stop reason: SDUPTHER

## 2020-07-16 NOTE — PROGRESS NOTES
Do you currently have any of the following:    Fever: No  Headache:  No  Cough: No  Shortness of breath: No  Exposed to anyone with these symptoms: No                                                                                                                Herminio Garza presents to the [facility] on 7/16/2020. Kane Espino is complaining of pain tail bone and buttocks The pain is persistent. The pain is described as aching and dull. Pain is rated on her best day at a 6, on her worst day at a 10, and on average at a 6 on the VAS scale. She took her last dose of gabapentin and zanaflex last evening out of norco. Kane Espino does not have issues with constipation. Any procedures since your last visit: No, with  % relief. She is not on NSAIDS and  is  on anticoagulation medications to include Plavix and is managed by Scott Hester DO  . Pacemaker or defibrilator: No Physician managing device is .       BP (!) 179/90   Pulse 68   Temp 97.6 °F (36.4 °C) (Infrared)   Resp 16   Ht 5' 3.5\" (1.613 m)   Wt 141 lb (64 kg)   SpO2 98%   BMI 24.59 kg/m²      No LMP recorded. Patient has had a hysterectomy.

## 2020-07-16 NOTE — PROGRESS NOTES
[address] [facility]  [address]    Follow up Note      Mykel Aguilera     Date of Visit:  2020    CC:  Patient presents for follow up   Chief Complaint   Patient presents with    Follow-up     tail bone and buttocks       HPI:    Pain is unchanged. Reports that she is no better after the surgery. She c/o coccyx pain that radiates across her buttocks. Was on a very long car ride recently. Having a lot of stress due to 2 sick children. Appropriate analgesia with current medications regimen: Yes. Change in quality of symptoms: no   Medication side effects:none. Recent diagnostic testing:none. Recent interventional procedures:none. She has been on anticoagulation medications to include Plavix. The patient  has not been on herbal supplements. The patient is not diabetic. Imaging:     Lumbar F/E films 2018 -   Posterior pedicle screw plates extend from U55 vertebral level to the   sacrum. Bony demineralization. Previous discectomy with loss of disc   space L1-2. Retrolisthesis L1-2 and anterolisthesis L4-5 S1. Multilevel disc space narrowing. No acute osseous finding. No   instability on flexion-extension views.      Lumbar MRI 2018 -   Posterior pedicle screw plates extend from P65 vertebral level to the   sacrum. Bony demineralization. Previous discectomy with loss of disc   space L1-2. Retrolisthesis L1-2 and anterolisthesis L4-5 S1. Multilevel disc space narrowing. No acute osseous finding.  No   instability on flexion-extension views.      Lumbar MRI 2016 -                   Potential Aberrant Drug-Related Behavior: no      Urine Drug Screenin2019 + hydrocodone and tramadol  2019 consistent  10/2019 consistent for hydrocodone and metabolite     OARRS report:  2019 consistent to 2019 consistent (post op medication s/p surgery)  2020 consistent  2020 consistent       Past Medical History:   Diagnosis Date    Anxiety     CVA [facility]cerebral vascular accident) ([facility] [address]     L FACIAL SENSATION    Depression     Diverticulitis     Hyperlipidemia     Hypertension     OA (osteoarthritis) of hip     OP (osteoporosis)     Shingles     R ABD    TIA (transient ischemic attack)     Tinnitus     Wears partial dentures     upper       Past Surgical History:   Procedure Laterality Date    BACK SURGERY      x3    CHOLECYSTECTOMY      COLONOSCOPY N/A 8/28/2019    COLONOSCOPY WITH BIOPSY performed by Bryan Jane MD at [facility] 9  8/28/2019    COLONOSCOPY POLYPECTOMY SNARE/COLD BIOPSY performed by Bryan Jane MD at [address] [place] [address]      kuldip lense implant    HERNIA REPAIR  2018    HYSTERECTOMY      LUMBAR LAMINECTOMY      SPINAL CORD STIMULATOR IMPLANTATION N/A 5/6/2019    T8 SPINAL CORD STIMULATOR  TRIAL PLACEMENT--SUNITHA, KODAK TABLE, BOSTON SCIENTIFIC performed by Mayi Blunt MD at [address] [place] [address] N/A 5/14/2019    PLACEMENT OF  PERMANENT SPINAL CORD STIMULATOR---XRAY, KODAK TABLE, BOSTON SCIENTIFIC performed by Mayi Blunt MD at [address] [place] [address] N/A 1/9/2020    REVISION OF SPINAL CORD STIMULATOR  BATTERY AND POCKET performed by Mayi Blunt MD at [address] N/A 8/28/2019    EGD BIOPSY performed by Bryan Jane MD at [address] [facility]       Prior to Admission medications    Medication Sig Start Date End Date Taking?  Authorizing Provider   tiZANidine (ZANAFLEX) 4 MG tablet Take 1 tablet by mouth 3 times daily 6/18/20  Yes Uriel Colunga DO   simvastatin (ZOCOR) 10 MG tablet Take 1 tablet by mouth daily 2/26/20  Yes Uriel Colunga DO   losartan (COZAAR) 50 MG tablet Take 1 tablet by mouth daily 2/12/20  Yes Uriel Colunga DO   citalopram (CELEXA) 10 MG tablet Take 1 tablet by mouth daily 2/2/20  Yes Uriel Colunga DO   senna (SENOKOT) 8.6 MG tablet Take 1 tablet by mouth 2 times daily 1/9/20 1/8/21 Yes Donel Bloch, PA-C   Handicap Placard MISC by Does not apply route Dx  Arthritis     5 yrs 12/16/19  Yes Uriel Colunga DO   carvedilol (COREG) 12.5 MG tablet Take 1 tablet by mouth 2 times daily (with meals) 10/28/19  Yes Uriel Colunga DO   hydrochlorothiazide (HYDRODIURIL) 25 MG tablet Take 1 tablet by mouth daily 10/28/19  Yes Uriel Colunga DO   clopidogrel (PLAVIX) 75 MG tablet Take 1 tablet by mouth daily 10/28/19  Yes Uriel Colunga DO   gabapentin (NEURONTIN) 300 MG capsule Take 1 capsule by mouth 3 times daily for 360 days. Patient taking differently: Take 300 mg by mouth 5 times daily. 10/28/19 10/22/20 Yes Uriel Colunga DO   dicyclomine (BENTYL) 10 MG capsule Take 1 capsule by mouth 4 times daily 8/13/19  Yes Arliss Bernheim, MD   sucralfate (CARAFATE) 1 GM tablet Take 1 tablet by mouth 4 times daily 8/6/19  Yes Uriel Colunga DO   pantoprazole (PROTONIX) 40 MG tablet TAKE 1 TABLET BY MOUTH ONCE DAILY ( TAKE B-12 1000MG AND D3 2000MG FOR GERD) 5/19/19  Yes Historical Provider, MD   vitamin B-12 (CYANOCOBALAMIN) 1000 MCG tablet Take 1,000 mcg by mouth daily   Yes Historical Provider, MD   vitamin D (D3-1000) 1000 units CAPS Take by mouth daily   Yes Historical Provider, MD       Allergies   Allergen Reactions    Pcn [Penicillins] Rash       Social History     Socioeconomic History    Marital status: Single     Spouse name: Not on file    Number of children: Not on file    Years of education: Not on file    Highest education level: Not on file   Occupational History    Not on file   Social Needs    Financial resource strain: Not on file    Food insecurity     Worry: Not on file     Inability: Not on file    Transportation needs     Medical: Not on file     Non-medical: Not on file   Tobacco Use    Smoking status: Never Smoker    Smokeless tobacco: Never Used   Substance and Sexual Activity    Alcohol use:  Yes     Alcohol/week: 1.0 standard drinks Types: 1 Glasses of wine per week     Comment: rare    Drug use: No    Sexual activity: Not Currently   Lifestyle    Physical activity     Days per week: Not on file     Minutes per session: Not on file    Stress: Not on file   Relationships    Social connections     Talks on phone: Not on file     Gets together: Not on file     Attends Rastafari service: Not on file     Active member of club or organization: Not on file     Attends meetings of clubs or organizations: Not on file     Relationship status: Not on file    Intimate partner violence     Fear of current or ex partner: Not on file     Emotionally abused: Not on file     Physically abused: Not on file     Forced sexual activity: Not on file   Other Topics Concern    Not on file   Social History Narrative        CATARACTS    TINNITUS    GB OUT     C [address] [facility] [place]  [address]    FH CVA---F  52 GF  FROM CVA AT 46    FH HTN BRO    CATH NEG     OP    TICS    BOYFRIEND [address] [facility]  1940 [address]2    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 BRADLEY---CAVALEIR X RAY    CH--5   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    Two out of town    RETIRED [employer] THEN WORKS [employer]    CVA AFFECT L FACIAL SENSATION  [facility] Dr RIVAS-----1-09    COLON 1-10----NEG BUT PREVIOUS TICS AND POLYP    TIA -11    SHINGLES R ABD -    MRI  WITH L-2-3 DISC AND NERVE----DR RIVAS----OR SET UP    ADMIT  WITH TIA----DR Alejandro Walton DC ASA AND ADDED PLAVIX TO THE PERSANTINE    LUMBAR CHERYL OR DR RIVAS 10-12------------AGAIN DONE SEAMUS RIVAS 16    MOTHER  SUICIDE AGE 43    DAD  AT 52 CVA    FIRST HUS SUICIDE AT 46    X RAY 11=13 WITH NEW L-1 -2 NARROWING AND RETROLISTHESIS----SHOTS WITH DR Chapman Babinski    ANX--DEP 1-14    OA HIPS DR CHARLTON---    DIET DM 4=16    LUMBAR DISC SURGERY -16 DR RIVAS    LEFT INGUINAL HERNIA WITH MESH DR BUNCH     ATHEROSCLEROIS EVAL DR RBOTHERS     EVAL DR ALFONSO NEWMAN--- ADIVSED ENT---PT SAYS HE TOLD HER NOT GET NEBULZIER---AND    REFUSESD FOLLOW UP    T-8 SPINAL CORD STIMULATOR TRIAL 5-8-19--------implanted permanent dr Randy Staples  5-19---device moved on buttock   1-20    PAIN MEDS WITH DR Meaghan Hansen PAIN [facility]    Son with  Lung ca and brain mets  2-20----- [place]       Family History   Problem Relation Age of Onset    Stroke Father        REVIEW OF SYSTEMS:     Milan Lema denies fever/chills, chest pain, shortness of breath, new bowel or bladder complaints. All other review of systems was negative. PHYSICAL EXAMINATION:      BP (!) 179/90   Pulse 68   Temp 97.6 °F (36.4 °C) (Infrared)   Resp 16   Ht 5' 3.5\" (1.613 m)   Wt 141 lb (64 kg)   SpO2 98%   BMI 24.59 kg/m²     General:      General appearance:   pleasant and well-hydrated. , in no discomfort and A & O x3  Build:Normal Weight  Function:Rises from a seated position with difficulty    HEENT:    Head:normocephalic and atraumatic  Pupils:regular, round and equal.  Sclera: icterus absent,    Lungs:    Breathing:Normal expansion. Clear to auscultation. No rales, rhonchi, or wheezing. Abdomen:    Shape:non-distended and normal  Tenderness:none  Guarding:none      Lumbar spine:    Range of motion:abnormal mildly Lateral bending, flexion, extension rotation bilateral and is painful. Extremities:    Tremors:None bilaterally upper and lower  Range of motion:Generally normal shoulders, pain with internal rotation of hips negative.   Intact:Yes  Varicose veins:absent   Cyanosis:none  Edema:Normal    Neurological:    Sensory:normal to light touch   Motor:   [address]    Dermatology:    Skin:no unusual rashes, no skin lesions, no palpable subcutaneous nodules and good skin turgor    Impression:    LBP and RLE pain in L5 distribution with diffuse weakness of the LLE  Multiple prior lumbar fusion surgeries with Dr. Patel Walls, most recent 9/2016 extending her fusion from T10-sacrum  On PLAVIX and ASA for Hx TIA's/CVA's  Has failed multiple surgeries, injections, PT, and medications  Had staged trial with Dr. Madelin Ledbetter 5/6/2019 and 5/14/2019, has had some improvement in her chronic pain thus far but does need a reprogramming, prior reprogramming has been helpful until she seems to lose the stimulation in the needed areas.                  Patient is s/p:  OPERATIONS PERFORMED:               1.  Exploration of left gluteal pocket for spinal cord stimulator                implantable pulse generator.                2.  Revision of the pocket.    3.  Replacement of implantable pulse generator and new Pocket on 01/09/2020. Patient reports no improvement at this time. She reports continued coccyx pain which radiates across her buttocks as well as right ankle pain. Discussed that she should f/u with the [employer] rep and NSG.     OARRS reviewed 07/2020  Continue Norco 5/325 BID #60, plan to wean off if able but for now not able to wean any further. Having difficulty sleeping due to pain. Continue gabapentin as currently prescribed from outside provider, TID  Continue off tizanidine if able, can take prn if necessary.  Has refills.    Compounding pain cream not helpful  [facility] PT - on hold due to pandemic  Patient encouraged to stay active  Treatment plan discussed with the patient including medication side effects     Controlled Substance Monitoring:    Acute and Chronic Pain Monitoring:   RX Monitoring 7/16/2020   Attestation -   Periodic Controlled Substance Monitoring Possible medication side effects, risk of tolerance/dependence & alternative treatments discussed. ;No signs of potential drug abuse or diversion identified. ;Assessed functional status. ;Obtaining appropriate analgesic effect of treatment.                          Plan:     We discussed with the patient that combining opioids, benzodiazepines, alcohol, illicit drugs or sleep aids increases the risk of respiratory depression including death.  We discussed that these medications may cause drowsiness, sedation or dizziness and have counseled the patient not to drive or operate machinery. We have discussed that these medications will be prescribed only by one provider. We have discussed with the patient about age related risk factors and have thoroughly discussed the importance of taking these medications as prescribed. The patient verbalizes understanding.

## 2020-07-20 LAB
6AM URINE: <10 NG/ML
7-AMINOCLONAZEPAM, URINE: <5 NG/ML
ALPHA-HYDROXYALPRAZOLAM, URINE: <5 NG/ML
ALPHA-HYDROXYMIDAZOLAM, URINE: <20 NG/ML
ALPRAZOLAM, URINE: <5 NG/ML
CHLORDIAZEPOXIDE, URINE: <20 NG/ML
CLONAZEPAM, URINE: <5 NG/ML
CODEINE, URINE: <20 NG/ML
DIAZEPAM, URINE: <20 NG/ML
HYDROCODONE, URINE: 583 NG/ML
HYDROMORPHONE, URINE: <20 NG/ML
LORAZEPAM, URINE: <20 NG/ML
MIDAZOLAM, URINE: <20 NG/ML
MORPHINE URINE: <20 NG/ML
NORDIAZEPAM, URINE: <20 NG/ML
NORHYDROCODONE, URINE: 276 NG/ML
NOROXYCODONE, URINE: <20 NG/ML
NOROXYMORPHONE, URINE: <20 NG/ML
OXAZEPAM, URINE: <20 NG/ML
OXYCODONE, URINE CONFIRMATION: <20 NG/ML
OXYMORPHONE, URINE: <20 NG/ML
TEMAZEPAM, URINE: <20 NG/ML

## 2020-07-22 LAB
Lab: NORMAL
REPORT: NORMAL
THIS TEST SENT TO: NORMAL

## 2020-08-07 ENCOUNTER — TELEPHONE (OUTPATIENT)
Dept: ADMINISTRATIVE | Age: 80
End: 2020-08-07

## 2020-08-07 ENCOUNTER — NURSE TRIAGE (OUTPATIENT)
Dept: OTHER | Facility: CLINIC | Age: 80
End: 2020-08-07

## 2020-08-07 NOTE — TELEPHONE ENCOUNTER
Pt called and said she has been having dizziness and light-headedness x 1 month. She has to sit down when she gets it. Spoke with nurse triage and transferred the call to the nurse.

## 2020-08-07 NOTE — TELEPHONE ENCOUNTER
Reason for Disposition   Dizziness not present now, but is a chronic symptom (recurrent or ongoing AND lasting > 4 weeks)    Answer Assessment - Initial Assessment Questions  1. DESCRIPTION: \"Describe your dizziness. \"      Will feel lightheaded at random times, comes and goes. Not currently lightheaded. 2. LIGHTHEADED: \"Do you feel lightheaded? \" (e.g., somewhat faint, woozy, weak upon standing)  Gets lightheaded, happens randomly and then goes awhile  3. VERTIGO: \"Do you feel like either you or the room is spinning or tilting? \" (i.e. vertigo)    4. SEVERITY: \"How bad is it? \"  \"Do you feel like you are going to faint? \" \"Can you stand and walk? \"    - MILD - walking normally    - MODERATE - interferes with normal activities (e.g., work, school)     - SEVERE - unable to stand, requires support to walk, feels like passing out now. No fainting  5. ONSET:  \"When did the dizziness begin? \"  Ongoing for over a month  6. AGGRAVATING FACTORS: \"Does anything make it worse? \" (e.g., standing, change in head position)     7. HEART RATE: \"Can you tell me your heart rate? \" \"How many beats in 15 seconds? \"  (Note: not all patients can do this)    No increase in heart rate  8. CAUSE: \"What do you think is causing the dizziness?\"    9. RECURRENT SYMPTOM: \"Have you had dizziness before? \" If so, ask: \"When was the last time? \" \"What happened that time? \"    10. OTHER SYMPTOMS: \"Do you have any other symptoms? \" (e.g., fever, chest pain, vomiting, diarrhea, bleeding)     No fever, no chest pain, no sob  11. PREGNANCY: \"Is there any chance you are pregnant? \" \"When was your last menstrual period? \"    Protocols used: [id number]    Received call from [facility]. Pt calling with ongoing intermittent lightheaded for over a month. Pt states will occur randomly. Will feel lightheaded, once she sits back down it will pass. No other symptoms. Recommend pt is seen within 2 weeks, call sooner if worsens.     Call soft transferred to [facility] in [address] to schedule appointment. Please do not reply to the triage nurse through this encounter. Any subsequent communication should be directly with the patient.

## 2020-08-10 ENCOUNTER — OFFICE VISIT (OUTPATIENT)
Dept: PRIMARY CARE CLINIC | Age: 80
End: 2020-08-10
Payer: MEDICARE

## 2020-08-10 ENCOUNTER — HOSPITAL ENCOUNTER (OUTPATIENT)
Age: 80
Discharge: HOME | End: 2020-08-12
Payer: MEDICARE

## 2020-08-10 VITALS
HEART RATE: 72 BPM | TEMPERATURE: 98.9 F | DIASTOLIC BLOOD PRESSURE: 78 MMHG | SYSTOLIC BLOOD PRESSURE: 128 MMHG | OXYGEN SATURATION: 97 % | WEIGHT: 142 LBS | BODY MASS INDEX: 24.76 KG/M2

## 2020-08-10 PROBLEM — R41.3 MEMORY IMPAIRMENT: Status: ACTIVE | Noted: 2020-08-10

## 2020-08-10 LAB
ALBUMIN SERPL-MCNC: 4.2 G/DL (ref 3.5–5.2)
ALP BLD-CCNC: 81 U/L (ref 35–104)
ALT SERPL-CCNC: 16 U/L (ref 0–32)
ANION GAP SERPL CALCULATED.3IONS-SCNC: 16 MMOL/L (ref 7–16)
AST SERPL-CCNC: 20 U/L (ref 0–31)
BACTERIA: ABNORMAL /HPF
BASOPHILS ABSOLUTE: 0.06 E9/L (ref 0–0.2)
BASOPHILS RELATIVE PERCENT: 0.6 % (ref 0–2)
BILIRUB SERPL-MCNC: 0.3 MG/DL (ref 0–1.2)
BILIRUBIN URINE: NEGATIVE
BLOOD, URINE: ABNORMAL
BUN BLDV-MCNC: 16 MG/DL (ref 8–23)
CALCIUM SERPL-MCNC: 10.1 MG/DL (ref 8.6–10.2)
CHLORIDE BLD-SCNC: 93 MMOL/L (ref 98–107)
CHOLESTEROL, TOTAL: 147 MG/DL (ref 0–199)
CLARITY: CLEAR
CO2: 26 MMOL/L (ref 22–29)
COLOR: YELLOW
CREAT SERPL-MCNC: 0.8 MG/DL (ref 0.5–1)
EOSINOPHILS ABSOLUTE: 0.26 E9/L (ref 0.05–0.5)
EOSINOPHILS RELATIVE PERCENT: 2.8 % (ref 0–6)
GFR AFRICAN AMERICAN: >60
GFR NON-AFRICAN AMERICAN: >60 ML/MIN/1.73
GLUCOSE BLD-MCNC: 102 MG/DL (ref 74–99)
GLUCOSE URINE: NEGATIVE MG/DL
HBA1C MFR BLD: 6 % (ref 4–5.6)
HCT VFR BLD CALC: 36.6 % (ref 34–48)
HDLC SERPL-MCNC: 52 MG/DL
HEMOGLOBIN: 12 G/DL (ref 11.5–15.5)
IMMATURE GRANULOCYTES #: 0.06 E9/L
IMMATURE GRANULOCYTES %: 0.6 % (ref 0–5)
KETONES, URINE: NEGATIVE MG/DL
LDL CHOLESTEROL CALCULATED: 60 MG/DL (ref 0–99)
LEUKOCYTE ESTERASE, URINE: ABNORMAL
LYMPHOCYTES ABSOLUTE: 2.97 E9/L (ref 1.5–4)
LYMPHOCYTES RELATIVE PERCENT: 32.1 % (ref 20–42)
MCH RBC QN AUTO: 32.3 PG (ref 26–35)
MCHC RBC AUTO-ENTMCNC: 32.8 % (ref 32–34.5)
MCV RBC AUTO: 98.4 FL (ref 80–99.9)
MONOCYTES ABSOLUTE: 0.9 E9/L (ref 0.1–0.95)
MONOCYTES RELATIVE PERCENT: 9.7 % (ref 2–12)
NEUTROPHILS ABSOLUTE: 5.01 E9/L (ref 1.8–7.3)
NEUTROPHILS RELATIVE PERCENT: 54.2 % (ref 43–80)
NITRITE, URINE: NEGATIVE
PDW BLD-RTO: 12 FL (ref 11.5–15)
PH UA: 7.5 (ref 5–9)
PLATELET # BLD: 346 E9/L (ref 130–450)
PMV BLD AUTO: 9.7 FL (ref 7–12)
POTASSIUM SERPL-SCNC: 5 MMOL/L (ref 3.5–5)
PROTEIN UA: NEGATIVE MG/DL
RBC # BLD: 3.72 E12/L (ref 3.5–5.5)
RBC UA: ABNORMAL /HPF (ref 0–2)
SODIUM BLD-SCNC: 135 MMOL/L (ref 132–146)
SPECIFIC GRAVITY UA: 1.01 (ref 1–1.03)
TOTAL PROTEIN: 7.1 G/DL (ref 6.4–8.3)
TRIGL SERPL-MCNC: 177 MG/DL (ref 0–149)
UROBILINOGEN, URINE: 0.2 E.U./DL
VITAMIN D 25-HYDROXY: 56 NG/ML (ref 30–100)
VLDLC SERPL CALC-MCNC: 35 MG/DL
WBC # BLD: 9.3 E9/L (ref 4.5–11.5)
WBC UA: ABNORMAL /HPF (ref 0–5)

## 2020-08-10 PROCEDURE — 80053 COMPREHEN METABOLIC PANEL: CPT

## 2020-08-10 PROCEDURE — 99214 OFFICE O/P EST MOD 30 MIN: CPT | Performed by: FAMILY MEDICINE

## 2020-08-10 PROCEDURE — 82306 VITAMIN D 25 HYDROXY: CPT

## 2020-08-10 PROCEDURE — 80061 LIPID PANEL: CPT

## 2020-08-10 PROCEDURE — 85025 COMPLETE CBC W/AUTO DIFF WBC: CPT

## 2020-08-10 PROCEDURE — 83036 HEMOGLOBIN GLYCOSYLATED A1C: CPT

## 2020-08-10 PROCEDURE — 36415 COLL VENOUS BLD VENIPUNCTURE: CPT

## 2020-08-10 PROCEDURE — 81001 URINALYSIS AUTO W/SCOPE: CPT

## 2020-08-10 ASSESSMENT — PATIENT HEALTH QUESTIONNAIRE - PHQ9
SUM OF ALL RESPONSES TO PHQ QUESTIONS 1-9: 0
SUM OF ALL RESPONSES TO PHQ QUESTIONS 1-9: 0
SUM OF ALL RESPONSES TO PHQ9 QUESTIONS 1 & 2: 0
2. FEELING DOWN, DEPRESSED OR HOPELESS: 0
1. LITTLE INTEREST OR PLEASURE IN DOING THINGS: 0

## 2020-08-10 ASSESSMENT — ENCOUNTER SYMPTOMS
RESPIRATORY NEGATIVE: 1
GASTROINTESTINAL NEGATIVE: 1
EYES NEGATIVE: 1
ALLERGIC/IMMUNOLOGIC NEGATIVE: 1

## 2020-08-10 NOTE — PROGRESS NOTES
8/10/20  Name: Eren Tran    : 1940    Sex: female    Age: [de-identified] y.o. Subjective:  Chief Complaint: Patient is here for ck  Re dizzy   Light heaed  forgetfull     Stress with 62 yr ol d son  of lung ca  Pt afraid to drive for fear of getting gloss-----   Wt up  8 lbs   Inactive   Back pain   Achy  Stiff           Review of Systems   Constitutional: Negative. HENT: Negative. Eyes: Negative. Respiratory: Negative. Cardiovascular: Negative. Gastrointestinal: Negative. Endocrine: Negative. Genitourinary: Negative. Musculoskeletal: Positive for arthralgias. Skin: Negative. Allergic/Immunologic: Negative. Neurological: Negative. Hematological: Negative. Psychiatric/Behavioral: Positive for confusion. The patient is nervous/anxious. Current Outpatient Medications:     HYDROcodone-acetaminophen (NORCO) 5-325 MG per tablet, Take 1 tablet by mouth 2 times daily as needed for Pain for up to 30 days. Intended supply: 30 days, Disp: 60 tablet, Rfl: 0    tiZANidine (ZANAFLEX) 4 MG tablet, Take 1 tablet by mouth 3 times daily, Disp: 270 tablet, Rfl: 3    simvastatin (ZOCOR) 10 MG tablet, Take 1 tablet by mouth daily, Disp: 90 tablet, Rfl: 3    losartan (COZAAR) 50 MG tablet, Take 1 tablet by mouth daily, Disp: 90 tablet, Rfl: 5    citalopram (CELEXA) 10 MG tablet, Take 1 tablet by mouth daily, Disp: 90 tablet, Rfl: 3    senna (SENOKOT) 8.6 MG tablet, Take 1 tablet by mouth 2 times daily, Disp: 60 tablet, Rfl: 0    carvedilol (COREG) 12.5 MG tablet, Take 1 tablet by mouth 2 times daily (with meals), Disp: 180 tablet, Rfl: 3    hydrochlorothiazide (HYDRODIURIL) 25 MG tablet, Take 1 tablet by mouth daily, Disp: 90 tablet, Rfl: 3    clopidogrel (PLAVIX) 75 MG tablet, Take 1 tablet by mouth daily, Disp: 90 tablet, Rfl: 3    gabapentin (NEURONTIN) 300 MG capsule, Take 1 capsule by mouth 3 times daily for 360 days.  (Patient taking differently: Take 300 mg by mouth 5 times daily. ), Disp: 270 capsule, Rfl: 3    dicyclomine (BENTYL) 10 MG capsule, Take 1 capsule by mouth 4 times daily, Disp: 120 capsule, Rfl: 3    sucralfate (CARAFATE) 1 GM tablet, Take 1 tablet by mouth 4 times daily, Disp: 120 tablet, Rfl: 3    pantoprazole (PROTONIX) 40 MG tablet, TAKE 1 TABLET BY MOUTH ONCE DAILY ( TAKE B-12 1000MG AND D3 2000MG FOR GERD), Disp: , Rfl: 12    vitamin B-12 (CYANOCOBALAMIN) 1000 MCG tablet, Take 1,000 mcg by mouth daily, Disp: , Rfl:     vitamin D (D3-1000) 1000 units CAPS, Take by mouth daily, Disp: , Rfl:   Allergies   Allergen Reactions    Pcn [Penicillins] Rash     Social History     Socioeconomic History    Marital status: Single     Spouse name: Not on file    Number of children: Not on file    Years of education: Not on file    Highest education level: Not on file   Occupational History    Not on file   Social Needs    Financial resource strain: Not on file    Food insecurity     Worry: Not on file     Inability: Not on file    Transportation needs     Medical: Not on file     Non-medical: Not on file   Tobacco Use    Smoking status: Never Smoker    Smokeless tobacco: Never Used   Substance and Sexual Activity    Alcohol use:  Yes     Alcohol/week: 1.0 standard drinks     Types: 1 Glasses of wine per week     Comment: rare    Drug use: No    Sexual activity: Not Currently   Lifestyle    Physical activity     Days per week: Not on file     Minutes per session: Not on file    Stress: Not on file   Relationships    Social connections     Talks on phone: Not on file     Gets together: Not on file     Attends Sabianism service: Not on file     Active member of club or organization: Not on file     Attends meetings of clubs or organizations: Not on file     Relationship status: Not on file    Intimate partner violence     Fear of current or ex partner: Not on file     Emotionally abused: Not on file     Physically abused: Not on file     Forced sexual activity: Not on file   Other Topics Concern    Not on file   Social History Narrative        CATARACTS    TINNITUS    GB OUT     C [address] [facility] [place] AT 43 FROM SUICIDE    FH CVA---F  52 GF  FROM CVA AT 46    FH HTN BRO    CATH NEG     OP    TICS    BOYFRIEND NHAN Matthew  1940 [address]2    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 BRADLEY---CAVALEIR X RAY    CH--5   Girl struthers   Two boys kinsman    Two out of town    [facility] [employer] [address]    CVA AFFECT L FACIAL SENSATION     [facility] OR [facility]-----    COLON 1-10----NEG BUT PREVIOUS TICS AND POLYP    TIA     SHINGLES R ABD     MRI  WITH L-2-3 DISC AND NERVE----DR RIVSA----OR SET UP    ADMIT  WITH TIA----DR Bennett Gave DC ASA AND ADDED PLAVIX TO THE PERSANTINE    LUMBAR CHERYL OR DR RIVAS 10-12------------AGAIN DONE SEAMUS RIVAS 16    MOTHER  SUICIDE AGE 43    DAD  AT 52 CVA    FIRST HUS SUICIDE AT 46    X RAY 11=13 WITH NEW L-1 -2 NARROWING AND RETROLISTHESIS----SHOTS WITH DR Sarina Lancaster    ANX--DEP 1-14    OA HIPS DR CHARLTON---    DIET DM 4=16    LUMBAR DISC SURGERY 16 DR RIVAS    LEFT INGUINAL HERNIA WITH MESH DR BUNCH     ATHEROSCLEROIS EVAL DR BROTHERS     EVAL DR ALFONSO NEWMAN--- ADIVSED ENT---PT SAYS HE TOLD HER NOT GET NEBULZIER---AND    REFUSESD FOLLOW UP    T-8 SPINAL CORD STIMULATOR TRIAL 19--------implanted permanent dr Maribel Aggarwal  ---device moved on buttock   1-20    PAIN MEDS WITH DR Bui Jackson PAIN [address]    Son with  Lung ca and brain mets  2-20----- [place]      Past Medical History:   Diagnosis Date    Anxiety     CVA (cerebral vascular accident) ([address]     L FACIAL SENSATION    Depression     Diverticulitis     Hyperlipidemia     Hypertension     OA (osteoarthritis) of hip     OP (osteoporosis)     Shingles     R ABD    TIA (transient ischemic attack)     Tinnitus     Wears partial dentures     upper     Family History   Problem Relation Age of Onset    Stroke Father       Past Surgical History:   Procedure Laterality Date    BACK SURGERY      x3    CHOLECYSTECTOMY      COLONOSCOPY N/A 8/28/2019    COLONOSCOPY WITH BIOPSY performed by Fabian Pina MD at [address] COLONOSCOPY  8/28/2019    COLONOSCOPY POLYPECTOMY SNARE/COLD BIOPSY performed by Fabian Pina MD at [facility]      kuldip lense implant    HERNIA REPAIR  2018    HYSTERECTOMY      LUMBAR LAMINECTOMY      SPINAL CORD STIMULATOR IMPLANTATION N/A 5/6/2019    T8 SPINAL CORD STIMULATOR  TRIAL PLACEMENT--SUNITHA, KODAK TABLE, BOSTON SCIENTIFIC performed by Sanjuanita Maurer MD at [address] [place] [address] 5/14/2019    PLACEMENT OF  PERMANENT SPINAL CORD STIMULATOR---XRAY, KODAK TABLE, BOSTON SCIENTIFIC performed by Sanjuanita Maurer MD at [address] [facility] N/A 1/9/2020    REVISION OF SPINAL CORD STIMULATOR  BATTERY AND POCKET performed by Sanjuanita Maurer MD at [address] N/A 8/28/2019    EGD BIOPSY performed by Fabian Pina MD at [address]:    08/10/20 1423   BP: 128/78   Pulse: 72   Temp: 98.9 °F (37.2 °C)   SpO2: 97%   Weight: 142 lb (64.4 kg)       Objective:    Physical Exam  Vitals signs reviewed. Constitutional:       Appearance: She is well-developed. HENT:      Head: Normocephalic. Eyes:      Pupils: Pupils are equal, round, and reactive to light. Neck:      Musculoskeletal: Normal range of motion. Cardiovascular:      Rate and Rhythm: Normal rate and regular rhythm. Pulmonary:      Effort: Pulmonary effort is normal.      Breath sounds: Normal breath sounds. Abdominal:      Palpations: Abdomen is soft. Musculoskeletal:      Comments: Marked  Dec rom lubmar spine   Skin:     General: Skin is warm. Neurological:      Mental Status: She is alert and oriented to person, place, and time.    Psychiatric:         Behavior: Behavior normal.         Atlee Lung was seen today for dizziness. Diagnoses and all orders for this visit:    Chronic pain syndrome    Mixed hyperlipidemia    Essential hypertension    Primary osteoarthritis involving multiple joints    Memory impairment  -     Nadeen Odell MD, Neurology, [facility]        Comments: lab appt  Dr Daniel Esteban     Sx  Call  Hm isuses    Ck   bp daily at home  rom exer taugh tfor arthritis      Low fat  Diet. Tommas Baptise Tommas Baptise A great deal of time spent reviewing medications, diet, exercise, social issues. Also reviewing the chart before entering the room with patient and finishing charting after leaving patient's room. More than half of that time was spent face to face with the patient in counseling and coordinating care.       Follow Up: Return in about 4 months (around 12/10/2020) for see   referral.     Seen by:  Asad Cobos DO

## 2020-08-11 ENCOUNTER — TELEPHONE (OUTPATIENT)
Dept: PRIMARY CARE CLINIC | Age: 80
End: 2020-08-11

## 2020-08-13 ENCOUNTER — OFFICE VISIT (OUTPATIENT)
Dept: PAIN MANAGEMENT | Age: 80
End: 2020-08-13
Payer: MEDICARE

## 2020-08-13 VITALS
HEIGHT: 63 IN | OXYGEN SATURATION: 93 % | SYSTOLIC BLOOD PRESSURE: 122 MMHG | TEMPERATURE: 97.4 F | WEIGHT: 142 LBS | RESPIRATION RATE: 16 BRPM | HEART RATE: 60 BPM | BODY MASS INDEX: 25.16 KG/M2 | DIASTOLIC BLOOD PRESSURE: 80 MMHG

## 2020-08-13 PROCEDURE — 99213 OFFICE O/P EST LOW 20 MIN: CPT | Performed by: PHYSICIAN ASSISTANT

## 2020-08-13 RX ORDER — HYDROCODONE BITARTRATE AND ACETAMINOPHEN 5; 325 MG/1; MG/1
1 TABLET ORAL 2 TIMES DAILY PRN
Qty: 60 TABLET | Refills: 0 | Status: SHIPPED
Start: 2020-08-15 | End: 2020-09-10 | Stop reason: SDUPTHER

## 2020-08-13 NOTE — PROGRESS NOTES
[facility]  [address]    Follow up Note      Yohannes Davila     Date of Visit:  2020    CC:  Patient presents for follow up   Chief Complaint   Patient presents with    Follow-up     across buttocks and down right leg toes on right foot       HPI:    Pain is unchanged. She reports no changes with her pain. Reports that her son recently passed away. Appropriate analgesia with current medications regimen: Yes. Change in quality of symptoms: no   Medication side effects:none. Recent diagnostic testing:none. Recent interventional procedures:none. She has been on anticoagulation medications to include Plavix. The patient  has not been on herbal supplements. The patient is not diabetic. Imaging:     Lumbar F/E films 2018 -   Posterior pedicle screw plates extend from G78 vertebral level to the   sacrum. Bony demineralization. Previous discectomy with loss of disc   space L1-2. Retrolisthesis L1-2 and anterolisthesis L4-5 S1. Multilevel disc space narrowing. No acute osseous finding. No   instability on flexion-extension views.      Lumbar MRI 2018 -   Posterior pedicle screw plates extend from D52 vertebral level to the   sacrum. Bony demineralization. Previous discectomy with loss of disc   space L1-2. Retrolisthesis L1-2 and anterolisthesis L4-5 S1. Multilevel disc space narrowing. No acute osseous finding.  No   instability on flexion-extension views.      Lumbar MRI 2016 -                   Potential Aberrant Drug-Related Behavior: no      Urine Drug Screenin2019 + hydrocodone and tramadol  2019 consistent  10/2019 consistent for hydrocodone and metabolite  2020 Consistent     OARRS report:  2019 consistent to 2019 consistent (post op medication s/p surgery)  2020 consistent  2020 consistent  2020 consistent       Past Medical History:   Diagnosis Date    Anxiety     CVA (cerebral vascular accident) ([address]     L FACIAL SENSATION    Depression     Diverticulitis     Hyperlipidemia     Hypertension     OA (osteoarthritis) of hip     OP (osteoporosis)     Shingles     R ABD    TIA (transient ischemic attack)     Tinnitus     Wears partial dentures     upper       Past Surgical History:   Procedure Laterality Date    BACK SURGERY      x3    CHOLECYSTECTOMY      COLONOSCOPY N/A 8/28/2019    COLONOSCOPY WITH BIOPSY performed by Fabian Pian MD at [address] COLONOSCOPY  8/28/2019    COLONOSCOPY POLYPECTOMY SNARE/COLD BIOPSY performed by Fabian Pina MD at [facility]      kuldip lense implant    HERNIA REPAIR  2018    HYSTERECTOMY      LUMBAR LAMINECTOMY      SPINAL CORD STIMULATOR IMPLANTATION N/A 5/6/2019    T8 SPINAL CORD STIMULATOR  TRIAL PLACEMENT--SUNITHA, KODAK TABLE, BOSTON SCIENTIFIC performed by Sanjuanita Maurer MD at [address] [place] [address] N/A 5/14/2019    PLACEMENT OF  PERMANENT SPINAL CORD STIMULATOR---XRAY, KODAK TABLE, BOSTON [employer]ENTIFIC performed by Sanjuanita Maurer MD at [address] [place] [address] N/A 1/9/2020    REVISION OF SPINAL CORD STIMULATOR  BATTERY AND POCKET performed by Sanjuanita Maurer MD at [address] N/A 8/28/2019    EGD BIOPSY performed by Fabian Pina MD at [facility]       Prior to Admission medications    Medication Sig Start Date End Date Taking? Authorizing Provider   HYDROcodone-acetaminophen (NORCO) 5-325 MG per tablet Take 1 tablet by mouth 2 times daily as needed for Pain for up to 30 days.  Intended supply: 30 days 8/15/20 9/14/20 Yes SEBASTIÁN Hsu   tiZANidine (ZANAFLEX) 4 MG tablet Take 1 tablet by mouth 3 times daily 6/18/20  Yes Uriel Colunga DO   simvastatin (ZOCOR) 10 MG tablet Take 1 tablet by mouth daily 2/26/20  Yes Uriel Colunga DO   losartan (COZAAR) 50 MG tablet Take 1 tablet by mouth daily 2/12/20  Yes Tamara Salcedo DO citalopram (CELEXA) 10 MG tablet Take 1 tablet by mouth daily 2/2/20  Yes Uriel Colunga DO   senna (SENOKOT) 8.6 MG tablet Take 1 tablet by mouth 2 times daily 1/9/20 1/8/21 Yes Henna Ruiz PA-C   carvedilol (COREG) 12.5 MG tablet Take 1 tablet by mouth 2 times daily (with meals) 10/28/19  Yes Uriel Colunga DO   hydrochlorothiazide (HYDRODIURIL) 25 MG tablet Take 1 tablet by mouth daily 10/28/19  Yes Uriel Colunga DO   clopidogrel (PLAVIX) 75 MG tablet Take 1 tablet by mouth daily 10/28/19  Yes Uriel Colunga DO   gabapentin (NEURONTIN) 300 MG capsule Take 1 capsule by mouth 3 times daily for 360 days. Patient taking differently: Take 300 mg by mouth 5 times daily.   10/28/19 10/22/20 Yes Uriel Colunga DO   dicyclomine (BENTYL) 10 MG capsule Take 1 capsule by mouth 4 times daily 8/13/19  Yes Rosi Rinne, MD   sucralfate (CARAFATE) 1 GM tablet Take 1 tablet by mouth 4 times daily 8/6/19  Yes Uriel Colunga DO   pantoprazole (PROTONIX) 40 MG tablet TAKE 1 TABLET BY MOUTH ONCE DAILY ( TAKE B-12 1000MG AND D3 2000MG FOR GERD) 5/19/19  Yes Historical Provider, MD   vitamin B-12 (CYANOCOBALAMIN) 1000 MCG tablet Take 1,000 mcg by mouth daily   Yes Historical Provider, MD   vitamin D (D3-1000) 1000 units CAPS Take by mouth daily   Yes Historical Provider, MD       Allergies   Allergen Reactions    Pcn [Penicillins] Rash       Social History     Socioeconomic History    Marital status: Single     Spouse name: Not on file    Number of children: Not on file    Years of education: Not on file    Highest education level: Not on file   Occupational History    Not on file   Social Needs    Financial resource strain: Not on file    Food insecurity     Worry: Not on file     Inability: Not on file    Transportation needs     Medical: Not on file     Non-medical: Not on file   Tobacco Use    Smoking status: Never Smoker    Smokeless tobacco: Never Used   Substance and Sexual Activity    Alcohol use:  Yes     Alcohol/week: 1.0 standard drinks     Types: 1 Glasses of wine per week     Comment: rare    Drug use: No    Sexual activity: Not Currently   Lifestyle    Physical activity     Days per week: Not on file     Minutes per session: Not on file    Stress: Not on file   Relationships    Social connections     Talks on phone: Not on file     Gets together: Not on file     Attends Confucianist service: Not on file     Active member of club or organization: Not on file     Attends meetings of clubs or organizations: Not on file     Relationship status: Not on file    Intimate partner violence     Fear of current or ex partner: Not on file     Emotionally abused: Not on file     Physically abused: Not on file     Forced sexual activity: Not on file   Other Topics Concern    Not on file   Social History Narrative        CATARACTS    TINNITUS    GB OUT     C [address] [facility]  [address]     CVA---F  52 GF  FROM CVA AT 46    FH HTN BRO    CATH NEG     OP    TICS    BOYFRIEND NHAN [address] Pauline Nath  1940 Page #2    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 BRADLEY---CAVALEIR X RAY    CH--5   Girl struthers   Two boys kinsman    Two out of town    RETIRED [employer] THEN WORKS [employer]    CVA AFFECT L FACIAL SENSATION  [facility] Dr RIVAS-----1-09    COLON 1-10----NEG BUT PREVIOUS TICS AND POLYP    TIA -11    SHINGLES R ABD 2-12    MRI  WITH L-2-3 DISC AND NERVE----DR RIVAS----OR SET UP    ADMIT  WITH TIA----DR Becca Arzola DC ASA AND ADDED PLAVIX TO THE PERSANTINE    LUMBAR CHERYL OR DR RIVAS 10-12------------AGAIN DONE SEAMUS RIVAS 16    MOTHER  SUICIDE AGE 43    DAD  AT 52 CVA    FIRST HUS SUICIDE AT 46    X RAY 11=13 WITH NEW L-1 -2 NARROWING AND RETROLISTHESIS----SHOTS WITH DR Cheyanne Lou    ANX--DEP 1-14    OA HIPS DR CHARLTON---    DIET DM 4=16    LUMBAR DISC SURGERY -16  [facility]    LEFT INGUINAL HERNIA WITH MESH DR Vee [facility]  ATHEROSCLEROIS EVAL DR BROTHERS 8-17    EVAL DR ALFONSO NEWMAN---7-18 ADIVSED ENT---PT SAYS HE TOLD HER NOT GET NEBULZIER---AND    REFUSESD FOLLOW UP    T-8 SPINAL CORD STIMULATOR TRIAL 5-8-19--------implanted permanent dr Maribel Aggarwal  5-19---device moved on buttock   1-20    PAIN MEDS WITH DR Bui Toledo PAIN MEKA SANDRA ABARCA [facility]    Son with  Lung ca and brain mets  2-20----- [place]       Family History   Problem Relation Age of Onset    Stroke Father        REVIEW OF SYSTEMS:     [facility] denies fever/chills, chest pain, shortness of breath, new bowel or bladder complaints. All other review of systems was negative. PHYSICAL EXAMINATION:      /80   Pulse 60   Temp 97.4 °F (36.3 °C) (Infrared)   Resp 16   Ht 5' 3\" (1.6 m)   Wt 142 lb (64.4 kg)   SpO2 93%   BMI 25.15 kg/m²     General:      General appearance:   pleasant and well-hydrated. , in no discomfort and A & O x3  Build:Normal Weight  Function:Rises from a seated position with difficulty    HEENT:    Head:normocephalic and atraumatic  Pupils:regular, round and equal.  Sclera: icterus absent,    Lungs:    Breathing:Normal expansion. Clear to auscultation. No rales, rhonchi, or wheezing. Abdomen:    Shape:non-distended and normal  Tenderness:none  Guarding:none      Lumbar spine:    Range of motion:abnormal mildly Lateral bending, flexion, extension rotation bilateral and is painful. Extremities:    Tremors:None bilaterally upper and lower  Range of motion:Generally normal shoulders, pain with internal rotation of hips negative.   Intact:Yes  Varicose veins:absent   Cyanosis:none  Edema:Normal    Neurological:    Sensory:normal to light touch   Motor:   [address]    Dermatology:    Skin:no unusual rashes, no skin lesions, no palpable subcutaneous nodules and good skin turgor    Impression:    LBP and RLE pain in L5 distribution with diffuse weakness of the LLE  Multiple prior lumbar fusion surgeries with Dr. Seble Stock, most recent 9/2016 extending her fusion from T10-sacrum  On PLAVIX and ASA for Hx TIA's/CVA's  Has failed multiple surgeries, injections, PT, and medications  Had staged trial with Dr. Everardo Blackburn 5/6/2019 and 5/14/2019, has had some improvement in her chronic pain thus far but does need a reprogramming, prior reprogramming has been helpful until she seems to lose the stimulation in the needed areas.                  Patient is s/p:  OPERATIONS PERFORMED:               1.  Exploration of left gluteal pocket for spinal cord stimulator                implantable pulse generator.                2.  Revision of the pocket.    3.  Replacement of implantable pulse generator and new Pocket on 01/09/2020. Patient reports no improvement at this time. She reports continued coccyx pain which radiates across her buttocks as well as right ankle pain. Discussed that she should f/u with the [employer] rep and NSG. I will arrange to have the rep here next visit. Son    Mohamud Dominguez reviewed 08/2020  Continue Norco 5/325 BID #60, plan to wean off if able but for now not able to wean any further. Having difficulty sleeping due to pain. Continue gabapentin as currently prescribed from outside provider, TID  Continue off tizanidine if able, can take prn if necessary.  Has refills.    Compounding pain cream not helpful  [facility] PT - on hold due to pandemic  Patient encouraged to stay active  Treatment plan discussed with the patient including medication side effects       Controlled Substance Monitoring:    Acute and Chronic Pain Monitoring:   RX Monitoring 8/13/2020   Attestation -   Periodic Controlled Substance Monitoring Possible medication side effects, risk of tolerance/dependence & alternative treatments discussed. ;No signs of potential drug abuse or diversion identified. ;Assessed functional status. ;Obtaining appropriate analgesic effect of treatment.                        Plan:     We discussed with the patient that combining opioids, benzodiazepines, alcohol, illicit drugs or sleep aids increases the risk of respiratory depression including death. We discussed that these medications may cause drowsiness, sedation or dizziness and have counseled the patient not to drive or operate machinery. We have discussed that these medications will be prescribed only by one provider. We have discussed with the patient about age related risk factors and have thoroughly discussed the importance of taking these medications as prescribed. The patient verbalizes understanding.

## 2020-08-13 NOTE — PROGRESS NOTES
Do you currently have any of the following:    Fever: No  Headache:  No  Cough: No  Shortness of breath: No  Exposed to anyone with these symptoms: No                                                                                                                Aretha Lynne presents to the [facility] on 8/13/2020. Yusuf De León is complaining of pain buttocks and down right leg tingling in right toes. The pain is constant. The pain is described as aching, dull, tender and numb. Pain is rated on her best day at a 6, on her worst day at a 8, and on average at a 6 on the VAS scale. She took her last dose of Norco, Neurontin and zanaflex,  . Yusuf De León does not have issues with constipation. Any procedures since your last visit: No, with  % relief. She is not on NSAIDS and  is  on anticoagulation medications to include Plavix and is managed by Isrrael Verma DO  . Pacemaker or defibrilator: No Physician managing device is . /80   Pulse 60   Temp 97.4 °F (36.3 °C) (Infrared)   Resp 16   Ht 5' 3\" (1.6 m)   Wt 142 lb (64.4 kg)   SpO2 93%   BMI 25.15 kg/m²      No LMP recorded. Patient has had a hysterectomy.

## 2020-08-31 ENCOUNTER — OFFICE VISIT (OUTPATIENT)
Dept: PRIMARY CARE CLINIC | Age: 80
End: 2020-08-31
Payer: MEDICARE

## 2020-08-31 ENCOUNTER — OFFICE VISIT (OUTPATIENT)
Dept: NEUROLOGY | Age: 80
End: 2020-08-31
Payer: MEDICARE

## 2020-08-31 ENCOUNTER — HOSPITAL ENCOUNTER (OUTPATIENT)
Age: 80
Discharge: HOME | End: 2020-09-02
Payer: MEDICARE

## 2020-08-31 ENCOUNTER — TELEPHONE (OUTPATIENT)
Dept: NEUROLOGY | Age: 80
End: 2020-08-31

## 2020-08-31 VITALS
BODY MASS INDEX: 25.15 KG/M2 | TEMPERATURE: 97.6 F | HEART RATE: 66 BPM | SYSTOLIC BLOOD PRESSURE: 128 MMHG | DIASTOLIC BLOOD PRESSURE: 82 MMHG | OXYGEN SATURATION: 98 % | WEIGHT: 142 LBS

## 2020-08-31 VITALS
TEMPERATURE: 96.6 F | WEIGHT: 143 LBS | DIASTOLIC BLOOD PRESSURE: 82 MMHG | BODY MASS INDEX: 25.34 KG/M2 | HEART RATE: 69 BPM | HEIGHT: 63 IN | OXYGEN SATURATION: 99 % | RESPIRATION RATE: 16 BRPM | SYSTOLIC BLOOD PRESSURE: 138 MMHG

## 2020-08-31 PROBLEM — R41.3 MEMORY IMPAIRMENT: Chronic | Status: ACTIVE | Noted: 2020-08-10

## 2020-08-31 LAB — TSH SERPL DL<=0.05 MIU/L-ACNC: 1.54 UIU/ML (ref 0.27–4.2)

## 2020-08-31 PROCEDURE — 83921 ORGANIC ACID SINGLE QUANT: CPT

## 2020-08-31 PROCEDURE — 36415 COLL VENOUS BLD VENIPUNCTURE: CPT

## 2020-08-31 PROCEDURE — 99213 OFFICE O/P EST LOW 20 MIN: CPT | Performed by: FAMILY MEDICINE

## 2020-08-31 PROCEDURE — 84425 ASSAY OF VITAMIN B-1: CPT

## 2020-08-31 PROCEDURE — 99205 OFFICE O/P NEW HI 60 MIN: CPT | Performed by: PSYCHIATRY & NEUROLOGY

## 2020-08-31 PROCEDURE — 84443 ASSAY THYROID STIM HORMONE: CPT

## 2020-08-31 ASSESSMENT — ENCOUNTER SYMPTOMS
EYES NEGATIVE: 1
TROUBLE SWALLOWING: 0
VOMITING: 0
PHOTOPHOBIA: 0
ALLERGIC/IMMUNOLOGIC NEGATIVE: 1
RESPIRATORY NEGATIVE: 1
GASTROINTESTINAL NEGATIVE: 1
NAUSEA: 0
SHORTNESS OF BREATH: 0
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
## 2020-08-31 NOTE — TELEPHONE ENCOUNTER
[employer] spinal cord stimulator  Model # [id number] WaveWriter  Lead Model # [id number] [employer]

## 2020-08-31 NOTE — PROGRESS NOTES
20  Name: Dai Tobias    : 1940    Sex: female    Age: [de-identified] y.o. Subjective:  Chief Complaint: Patient is here for discuss rigo william       She liked  Dr Stephen Bautista and herre to get his lab done and  reivw with me the visit    For mri and cognitive testing soon      Review of Systems   Constitutional: Negative. HENT: Negative. Eyes: Negative. Respiratory: Negative. Cardiovascular: Negative. Gastrointestinal: Negative. Endocrine: Negative. Genitourinary: Negative. Skin: Negative. Allergic/Immunologic: Negative. Neurological: Negative. Poor short term memory   Hematological: Negative. Psychiatric/Behavioral: Negative. Current Outpatient Medications:     HYDROcodone-acetaminophen (NORCO) 5-325 MG per tablet, Take 1 tablet by mouth 2 times daily as needed for Pain for up to 30 days. Intended supply: 30 days, Disp: 60 tablet, Rfl: 0    tiZANidine (ZANAFLEX) 4 MG tablet, Take 1 tablet by mouth 3 times daily, Disp: 270 tablet, Rfl: 3    simvastatin (ZOCOR) 10 MG tablet, Take 1 tablet by mouth daily, Disp: 90 tablet, Rfl: 3    losartan (COZAAR) 50 MG tablet, Take 1 tablet by mouth daily, Disp: 90 tablet, Rfl: 5    citalopram (CELEXA) 10 MG tablet, Take 1 tablet by mouth daily, Disp: 90 tablet, Rfl: 3    senna (SENOKOT) 8.6 MG tablet, Take 1 tablet by mouth 2 times daily, Disp: 60 tablet, Rfl: 0    carvedilol (COREG) 12.5 MG tablet, Take 1 tablet by mouth 2 times daily (with meals), Disp: 180 tablet, Rfl: 3    hydrochlorothiazide (HYDRODIURIL) 25 MG tablet, Take 1 tablet by mouth daily, Disp: 90 tablet, Rfl: 3    clopidogrel (PLAVIX) 75 MG tablet, Take 1 tablet by mouth daily, Disp: 90 tablet, Rfl: 3    gabapentin (NEURONTIN) 300 MG capsule, Take 1 capsule by mouth 3 times daily for 360 days.  (Patient taking differently: Take 300 mg by mouth 5 times daily. ), Disp: 270 capsule, Rfl: 3    dicyclomine (BENTYL) 10 MG capsule, Take 1 capsule by mouth 4 times daily, Disp: 120 capsule, Rfl: 3    sucralfate (CARAFATE) 1 GM tablet, Take 1 tablet by mouth 4 times daily, Disp: 120 tablet, Rfl: 3    pantoprazole (PROTONIX) 40 MG tablet, TAKE 1 TABLET BY MOUTH ONCE DAILY ( TAKE B-12 1000MG AND D3 2000MG FOR GERD), Disp: , Rfl: 12    vitamin B-12 (CYANOCOBALAMIN) 1000 MCG tablet, Take 1,000 mcg by mouth daily, Disp: , Rfl:     vitamin D (D3-1000) 1000 units CAPS, Take by mouth daily, Disp: , Rfl:   Allergies   Allergen Reactions    Pcn [Penicillins] Rash     Social History     Socioeconomic History    Marital status: Single     Spouse name: Not on file    Number of children: Not on file    Years of education: Not on file    Highest education level: Not on file   Occupational History    Not on file   Social Needs    Financial resource strain: Not on file    Food insecurity     Worry: Not on file     Inability: Not on file    Transportation needs     Medical: Not on file     Non-medical: Not on file   Tobacco Use    Smoking status: Never Smoker    Smokeless tobacco: Never Used   Substance and Sexual Activity    Alcohol use: Yes     Comment: rare    Drug use: No    Sexual activity: Not Currently   Lifestyle    Physical activity     Days per week: Not on file     Minutes per session: Not on file    Stress: Not on file   Relationships    Social connections     Talks on phone: Not on file     Gets together: Not on file     Attends Religion service: Not on file     Active member of club or organization: Not on file     Attends meetings of clubs or organizations: Not on file     Relationship status: Not on file    Intimate partner violence     Fear of current or ex partner: Not on file     Emotionally abused: Not on file     Physically abused: Not on file     Forced sexual activity: Not on file   Other Topics Concern    Not on file   Social History Narrative        CATARACTS    TINNITUS    WHITNEY [place] [facility] 8/28/2019    COLONOSCOPY WITH BIOPSY performed by Graham Agosto MD at [address] COLONOSCOPY  8/28/2019    COLONOSCOPY POLYPECTOMY SNARE/COLD BIOPSY performed by Graham Agosto MD at [address]      kuldip lense implant    HERNIA REPAIR  2018    HYSTERECTOMY      LUMBAR LAMINECTOMY      SPINAL CORD STIMULATOR IMPLANTATION N/A 5/6/2019    T8 SPINAL CORD STIMULATOR  TRIAL PLACEMENT--SUNITHA, KODAK [employer], [employer] performed by Jose Carlos Veras MD at [address] [place] [address] 5/14/2019    PLACEMENT OF  PERMANENT SPINAL CORD STIMULATOR---XRAY, KODAK TABLE, [employer] performed by Jose Carlos Veras MD at [address] [place] [address] N/A 1/9/2020    REVISION OF SPINAL CORD STIMULATOR  BATTERY AND POCKET performed by Jose Carlos Veras MD at [address] N/A 8/28/2019    EGD BIOPSY performed by Graham Agosto MD at [address]:    08/31/20 0942   BP: 128/82   Pulse: 66   Temp: 97.6 °F (36.4 °C)   SpO2: 98%   Weight: 142 lb (64.4 kg)       Objective:    Physical Exam  Vitals signs reviewed. Constitutional:       Appearance: She is well-developed. HENT:      Head: Normocephalic. Eyes:      Pupils: Pupils are equal, round, and reactive to light. Neck:      Musculoskeletal: Normal range of motion. Cardiovascular:      Rate and Rhythm: Normal rate and regular rhythm. Pulmonary:      Effort: Pulmonary effort is normal.      Breath sounds: Normal breath sounds. Abdominal:      Palpations: Abdomen is soft. Musculoskeletal: Normal range of motion. Skin:     General: Skin is warm. Neurological:      Mental Status: She is alert and oriented to person, place, and time. Comments: Poor short term memory   Psychiatric:         Behavior: Behavior normal.         Antonio Fabian was seen today for discuss medications.     Diagnoses and all orders for this visit:    Memory impairment    Essential hypertension    Chronic pain syndrome        Comments: awit mri and neuro testing   A great deal of time spent reviewing medications, diet, exercise, social issues. Also reviewing the chart before entering the room with patient and finishing charting after leaving patient's room. More than half of that time was spent face to face with the patient in counseling and coordinating care. Follow Up: Return for reg.      Seen by:  Kathy Walker DO

## 2020-09-03 LAB — METHYLMALONIC ACID: 0.28 UMOL/L (ref 0–0.4)

## 2020-09-04 LAB — VITAMIN B1 WHOLE BLOOD: 96 NMOL/L (ref 70–180)

## 2020-09-08 ENCOUNTER — TELEPHONE (OUTPATIENT)
Dept: NEUROLOGY | Age: 80
End: 2020-09-08

## 2020-09-08 RX ORDER — CLOPIDOGREL BISULFATE 75 MG/1
75 TABLET ORAL DAILY
Qty: 90 TABLET | Refills: 3 | Status: SHIPPED
Start: 2020-09-08 | End: 2021-07-02 | Stop reason: SDUPTHER

## 2020-09-08 NOTE — TELEPHONE ENCOUNTER
----- Message from Xu Tam MD sent at 9/8/2020  8:28 AM EDT -----  Please inform the patient that her blood work has been normal so far

## 2020-09-10 ENCOUNTER — OFFICE VISIT (OUTPATIENT)
Dept: PAIN MANAGEMENT | Age: 80
End: 2020-09-10
Payer: MEDICARE

## 2020-09-10 VITALS
DIASTOLIC BLOOD PRESSURE: 60 MMHG | WEIGHT: 142 LBS | HEART RATE: 58 BPM | RESPIRATION RATE: 16 BRPM | BODY MASS INDEX: 25.16 KG/M2 | TEMPERATURE: 97.1 F | HEIGHT: 63 IN | SYSTOLIC BLOOD PRESSURE: 102 MMHG

## 2020-09-10 PROCEDURE — 99213 OFFICE O/P EST LOW 20 MIN: CPT | Performed by: PHYSICIAN ASSISTANT

## 2020-09-10 RX ORDER — HYDROCODONE BITARTRATE AND ACETAMINOPHEN 5; 325 MG/1; MG/1
1 TABLET ORAL 2 TIMES DAILY PRN
Qty: 60 TABLET | Refills: 0 | Status: SHIPPED
Start: 2020-09-14 | End: 2020-10-08 | Stop reason: SDUPTHER

## 2020-09-10 NOTE — PROGRESS NOTES
[facility]  [address] [facility], [address]    Follow up Note      Torsten Guzmán     Date of Visit:  9/10/2020    CC:  Patient presents for follow up   Chief Complaint   Patient presents with    Lower Back Pain       HPI:    Pain is unchanged. She reports no changes with her pain. Rep here today making some adjustments. Appropriate analgesia with current medications regimen: Yes. Change in quality of symptoms: no   Medication side effects:none. Recent diagnostic testing:none. Recent interventional procedures:none. She has been on anticoagulation medications to include Plavix. The patient  has not been on herbal supplements. The patient is not diabetic. Imaging:     Lumbar F/E films 2018 -   Posterior pedicle screw plates extend from F21 vertebral level to the   sacrum. Bony demineralization. Previous discectomy with loss of disc   space L1-2. Retrolisthesis L1-2 and anterolisthesis L4-5 S1. Multilevel disc space narrowing. No acute osseous finding. No   instability on flexion-extension views.      Lumbar MRI 2018 -   Posterior pedicle screw plates extend from I95 vertebral level to the   sacrum. Bony demineralization. Previous discectomy with loss of disc   space L1-2. Retrolisthesis L1-2 and anterolisthesis L4-5 S1. Multilevel disc space narrowing. No acute osseous finding.  No   instability on flexion-extension views.      Lumbar MRI 2016 -                   Potential Aberrant Drug-Related Behavior: no      Urine Drug Screenin2019 + hydrocodone and tramadol  2019 consistent  10/2019 consistent for hydrocodone and metabolite  2020 Consistent     OARRS report:  2019 consistent to 2019 consistent (post op medication s/p surgery)  2020 consistent to 2020 consistent       Past Medical History:   Diagnosis Date    Anxiety     CVA (cerebral vascular accident) ([facility] [address]     L FACIAL SENSATION    Depression     Diverticulitis     Hyperlipidemia     Hypertension     OA (osteoarthritis) of hip     OP (osteoporosis)     Shingles     R ABD    TIA (transient ischemic attack)     Tinnitus     Wears partial dentures     upper       Past Surgical History:   Procedure Laterality Date    BACK SURGERY      x3    CHOLECYSTECTOMY      COLONOSCOPY N/A 8/28/2019    COLONOSCOPY WITH BIOPSY performed by Monse Mark MD at [address] COLONOSCOPY  8/28/2019    COLONOSCOPY POLYPECTOMY SNARE/COLD BIOPSY performed by Monse Mark MD at [address] [facility] 635      kuldip lense implant    HERNIA REPAIR  2018    HYSTERECTOMY      LUMBAR LAMINECTOMY      SPINAL CORD STIMULATOR IMPLANTATION N/A 5/6/2019    T8 SPINAL CORD STIMULATOR  TRIAL PLACEMENT--SUNITHA, KODAK TABLE, BOSTON SCIENTIFIC performed by Delmy Montoya MD at [facility] N/A 5/14/2019    PLACEMENT OF  PERMANENT SPINAL CORD STIMULATOR---XRAY, KODAK TABLE, BOSTON SCIENTIFIC performed by Delmy Montoya MD at [facility] N/A 1/9/2020    REVISION OF SPINAL CORD STIMULATOR  BATTERY AND POCKET performed by Delmy Montoya MD at [place] [address] N/A 8/28/2019    EGD BIOPSY performed by Monse Mark MD at [address] [place] [address]       Prior to Admission medications    Medication Sig Start Date End Date Taking? Authorizing Provider   clopidogrel (PLAVIX) 75 MG tablet Take 1 tablet by mouth daily 9/8/20   Uriel Colunga DO   HYDROcodone-acetaminophen (NORCO) 5-325 MG per tablet Take 1 tablet by mouth 2 times daily as needed for Pain for up to 30 days.  Intended supply: 30 days 8/15/20 9/14/20  SEBASTIÁN Mcallister   tiZANidine (ZANAFLEX) 4 MG tablet Take 1 tablet by mouth 3 times daily 6/18/20   Uriel Colunga DO   simvastatin (ZOCOR) 10 MG tablet Take 1 tablet by mouth daily 2/26/20   Uriel Colunga DO   losartan (COZAAR) 50 MG tablet Take 1 tablet by mouth daily 2/12/20   Derrell RUBY DO Keanu   citalopram (CELEXA) 10 MG tablet Take 1 tablet by mouth daily 2/2/20   Mando Luong TUNG Colunga DO   senna (SENOKOT) 8.6 MG tablet Take 1 tablet by mouth 2 times daily 1/9/20 1/8/21  Patric Gomez PA-C   carvedilol (COREG) 12.5 MG tablet Take 1 tablet by mouth 2 times daily (with meals) 10/28/19   Uriel Colunga DO   hydrochlorothiazide (HYDRODIURIL) 25 MG tablet Take 1 tablet by mouth daily 10/28/19   Uriel Colunga DO   gabapentin (NEURONTIN) 300 MG capsule Take 1 capsule by mouth 3 times daily for 360 days. Patient taking differently: Take 300 mg by mouth 5 times daily.   10/28/19 10/22/20  Uriel Colunga DO   dicyclomine (BENTYL) 10 MG capsule Take 1 capsule by mouth 4 times daily 8/13/19   Dajuan Guevara MD   sucralfate (CARAFATE) 1 GM tablet Take 1 tablet by mouth 4 times daily 8/6/19   Uriel Colunga DO   pantoprazole (PROTONIX) 40 MG tablet TAKE 1 TABLET BY MOUTH ONCE DAILY ( TAKE B-12 1000MG AND D3 2000MG FOR GERD) 5/19/19   Historical Provider, MD   vitamin B-12 (CYANOCOBALAMIN) 1000 MCG tablet Take 1,000 mcg by mouth daily    Historical Provider, MD   vitamin D (D3-1000) 1000 units CAPS Take by mouth daily    Historical Provider, MD       Allergies   Allergen Reactions    Pcn [Penicillins] Rash       Social History     Socioeconomic History    Marital status: Single     Spouse name: Not on file    Number of children: Not on file    Years of education: Not on file    Highest education level: Not on file   Occupational History    Not on file   Social Needs    Financial resource strain: Not on file    Food insecurity     Worry: Not on file     Inability: Not on file    Transportation needs     Medical: Not on file     Non-medical: Not on file   Tobacco Use    Smoking status: Never Smoker    Smokeless tobacco: Never Used   Substance and Sexual Activity    Alcohol use: Yes     Comment: rare    Drug use: No    Sexual activity: Not Currently   Lifestyle    Physical activity Days per week: Not on file     Minutes per session: Not on file    Stress: Not on file   Relationships    Social connections     Talks on phone: Not on file     Gets together: Not on file     Attends Jewish service: Not on file     Active member of club or organization: Not on file     Attends meetings of clubs or organizations: Not on file     Relationship status: Not on file    Intimate partner violence     Fear of current or ex partner: Not on file     Emotionally abused: Not on file     Physically abused: Not on file     Forced sexual activity: Not on file   Other Topics Concern    Not on file   Social History Narrative        CATARACTS    TINNITUS    GB OUT     C [address] [facility]  [address]     CVA---F  52 GF  FROM CVA AT 46    FH HTN BRO    CATH NEG     OP    TICS    BOYFRIEND [address] [facility]  1940 [address]    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 BRADLEY---CAVALEIR X RAY    CH--5   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 Dr RIVAS-----09    COLON 1-10----NEG BUT PREVIOUS TICS AND POLYP    TIA -11    SHINGLES R ABD -12    MRI  WITH L-2-3 DISC AND NERVE----DR RIVAS----OR SET UP    ADMIT  WITH TIA----DR Bravo Dill DC ASA AND ADDED PLAVIX TO THE PERSANTINE    LUMBAR CHERYL OR DR RIVAS 10-12------------AGAIN DONE SEAMUS RIVAS     MOTHER  SUICIDE AGE 43    DAD  AT 52 CVA    FIRST HUS SUICIDE AT 46    X RAY 11=13 WITH NEW L-1 -2 NARROWING AND RETROLISTHESIS----SHOTS WITH DR Aura Ocampo    ANX--DEP 1-14    OA HIPS DR CHARLTON---    DIET DM 4=16    LUMBAR DISC SURGERY 16 DR RIVAS    LEFT INGUINAL HERNIA WITH MESH DR BUNCH 17    ATHEROSCLEROIS EVAL DR BROTHERS     EVAL DR ALFONSO NEWMAN--- ADIVSED ENT---PT SAYS HE TOLD HER NOT GET NEBULZIER---AND    REFUSESD FOLLOW UP    T-8 SPINAL CORD STIMULATOR TRIAL 9-8-04--------implanted prescribed. The patient verbalizes understanding.

## 2020-09-10 NOTE — PROGRESS NOTES
Do you currently have any of the following:    Fever: No  Headache:  No  Cough: No  Shortness of breath: No  Exposed to anyone with these symptoms: Sarah                                                                                                                Primoia Andrae presents to the [facility] on 9/10/2020. Clinton Harris is complaining of pain down right leg. . The pain is intermittent. The pain is described as aching. Pain is rated on her best day at a 4, on her worst day at a 6, and on average at a 5 on the VAS scale. She took her last dose of [place] at [place] [facility]. Clinton Harris does not have issues with constipation. Any procedures since your last visit: No,     She is not on NSAIDS and  is  on anticoagulation medications to include Plavix and is managed by Herminio Manuel DO  . Pacemaker or defibrilator: No Physician managing device is NA.       /60   Pulse 58   Temp 97.1 °F (36.2 °C) (Infrared)   Resp 16   Ht 5' 3\" (1.6 m)   Wt 142 lb (64.4 kg)   BMI 25.15 kg/m²      No LMP recorded. Patient has had a hysterectomy.

## 2020-09-11 ENCOUNTER — HOSPITAL ENCOUNTER (OUTPATIENT)
Dept: MRI IMAGING | Age: 80
Discharge: HOME | End: 2020-09-13
Payer: MEDICARE

## 2020-09-11 PROCEDURE — 70551 MRI BRAIN STEM W/O DYE: CPT

## 2020-09-16 ENCOUNTER — TELEPHONE (OUTPATIENT)
Dept: NEUROLOGY | Age: 80
End: 2020-09-16

## 2020-09-16 NOTE — TELEPHONE ENCOUNTER
Left message for patient and informed her that the MRI brain shows age-related shrinkage and white spots.

## 2020-10-06 ENCOUNTER — TELEPHONE (OUTPATIENT)
Dept: NEUROLOGY | Age: 80
End: 2020-10-06

## 2020-10-06 NOTE — TELEPHONE ENCOUNTER
Left message to see if patient having trouble scheduling with [facility]. Per [facility], they attempted to contact her several times without a response.  Call back number and [facility] number given

## 2020-10-08 ENCOUNTER — OFFICE VISIT (OUTPATIENT)
Dept: PAIN MANAGEMENT | Age: 80
End: 2020-10-08
Payer: MEDICARE

## 2020-10-08 VITALS
BODY MASS INDEX: 25.16 KG/M2 | OXYGEN SATURATION: 97 % | HEART RATE: 74 BPM | TEMPERATURE: 98.3 F | HEIGHT: 63 IN | SYSTOLIC BLOOD PRESSURE: 118 MMHG | WEIGHT: 142 LBS | RESPIRATION RATE: 16 BRPM | DIASTOLIC BLOOD PRESSURE: 80 MMHG

## 2020-10-08 PROCEDURE — 99213 OFFICE O/P EST LOW 20 MIN: CPT | Performed by: PHYSICIAN ASSISTANT

## 2020-10-08 RX ORDER — HYDROCODONE BITARTRATE AND ACETAMINOPHEN 5; 325 MG/1; MG/1
1 TABLET ORAL 2 TIMES DAILY PRN
Qty: 60 TABLET | Refills: 0 | Status: SHIPPED
Start: 2020-10-14 | End: 2020-11-23 | Stop reason: SDUPTHER

## 2020-10-08 NOTE — PROGRESS NOTES
Do you currently have any of the following:    Fever: No  Headache:  No  Cough: No  Shortness of breath: No  Exposed to anyone with these symptoms: No                                                                                                                Faheem Little presents to the [facility] on 10/8/2020. Fernando Oscar is complaining of pain in lower back. . The pain is constant. The pain is described as aching and throbbing. Pain is rated on her best day at a 7, on her worst day at a 10, and on average at a 8 on the VAS scale. She took her last dose of Norco last evening. Sammi Mc does have issues with constipation. Any procedures since your last visit: No,     She is not on NSAIDS and  is  on anticoagulation medications to include Plavix and is managed by Cinthya Thornton DO  . Pacemaker or defibrilator: No Physician managing device is NA.       /80   Pulse 74   Temp 98.3 °F (36.8 °C) (Infrared)   Resp 16   Ht 5' 3\" (1.6 m)   Wt 142 lb (64.4 kg)   SpO2 97%   BMI 25.15 kg/m²      No LMP recorded. Patient has had a hysterectomy.

## 2020-10-08 NOTE — PROGRESS NOTES
[address] [facility]  [address]    Follow up Note      Faheem Little     Date of Visit:  10/8/2020    CC:  Patient presents for follow up   Chief Complaint   Patient presents with    Lower Back Pain       HPI:    Pain is unchanged. No new changes. Appropriate analgesia with current medications regimen: Yes. Change in quality of symptoms: no   Medication side effects:none. Recent diagnostic testing:none. Recent interventional procedures:none. She has been on anticoagulation medications to include Plavix. The patient  has not been on herbal supplements. The patient is not diabetic. Imaging:     Lumbar F/E films 2018 -   Posterior pedicle screw plates extend from N09 vertebral level to the   sacrum. Bony demineralization. Previous discectomy with loss of disc   space L1-2. Retrolisthesis L1-2 and anterolisthesis L4-5 S1. Multilevel disc space narrowing. No acute osseous finding. No   instability on flexion-extension views.      Lumbar MRI 2018 -   Posterior pedicle screw plates extend from A59 vertebral level to the   sacrum. Bony demineralization. Previous discectomy with loss of disc   space L1-2. Retrolisthesis L1-2 and anterolisthesis L4-5 S1. Multilevel disc space narrowing. No acute osseous finding.  No   instability on flexion-extension views.      Lumbar MRI 2016 -                   Potential Aberrant Drug-Related Behavior: no      Urine Drug Screenin2019 + hydrocodone and tramadol  2019 consistent  10/2019 consistent for hydrocodone and metabolite  2020 Consistent     OARRS report:  2019 consistent to 2019 consistent (post op medication s/p surgery)  2020 consistent to 10/2020 consistent       Past Medical History:   Diagnosis Date    Anxiety     CVA (cerebral vascular accident) ([facility] [address]     L FACIAL SENSATION    Depression     Diverticulitis     Hyperlipidemia     Hypertension     OA (osteoarthritis) of hip  OP (osteoporosis)     Shingles     R ABD    TIA (transient ischemic attack)     Tinnitus     Wears partial dentures     upper       Past Surgical History:   Procedure Laterality Date    BACK SURGERY      x3    CHOLECYSTECTOMY      COLONOSCOPY N/A 8/28/2019    COLONOSCOPY WITH BIOPSY performed by Anisa Bustamante MD at [address] COLONOSCOPY  8/28/2019    COLONOSCOPY POLYPECTOMY SNARE/COLD BIOPSY performed by Anisa Bustamante MD at [address] [facility] 635      kuldip lense implant    HERNIA REPAIR  2018    HYSTERECTOMY      LUMBAR LAMINECTOMY      SPINAL CORD STIMULATOR IMPLANTATION N/A 5/6/2019    T8 SPINAL CORD STIMULATOR  TRIAL PLACEMENT--SUNITHA, KODAK TABLE, BOSTON SCIENTIFIC performed by Princess Angelo MD at [address] [place] [address] N/A 5/14/2019    PLACEMENT OF  PERMANENT SPINAL CORD STIMULATOR---XRAY, KODAK TABLE, BOSTON SCIENTIFIC performed by Princess Angelo MD at [address] [place] [address] N/A 1/9/2020    REVISION OF SPINAL CORD STIMULATOR  BATTERY AND POCKET performed by Princess Angelo MD at [facility] N/A 8/28/2019    EGD BIOPSY performed by Anisa Bustamante MD at [address] [place] [address]       Prior to Admission medications    Medication Sig Start Date End Date Taking? Authorizing Provider   HYDROcodone-acetaminophen (NORCO) 5-325 MG per tablet Take 1 tablet by mouth 2 times daily as needed for Pain for up to 30 days.  Intended supply: 30 days 10/14/20 11/13/20 Yes SEBASTIÁN Pyle   clopidogrel (PLAVIX) 75 MG tablet Take 1 tablet by mouth daily 9/8/20  Yes Uriel Colunga DO   tiZANidine (ZANAFLEX) 4 MG tablet Take 1 tablet by mouth 3 times daily 6/18/20  Yes Uriel Colunga DO   simvastatin (ZOCOR) 10 MG tablet Take 1 tablet by mouth daily 2/26/20  Yes Uriel Colunga DO   losartan (COZAAR) 50 MG tablet Take 1 tablet by mouth daily 2/12/20  Yes Uriel Colunga DO   citalopram (CELEXA) 10 MG tablet Take 1 tablet by mouth daily 2/2/20  Yes Uriel Colunga DO   senna (SENOKOT) 8.6 MG tablet Take 1 tablet by mouth 2 times daily 1/9/20 1/8/21 Yes Henna Ruiz PA-C   carvedilol (COREG) 12.5 MG tablet Take 1 tablet by mouth 2 times daily (with meals) 10/28/19  Yes Uriel Colunga DO   hydrochlorothiazide (HYDRODIURIL) 25 MG tablet Take 1 tablet by mouth daily 10/28/19  Yes Uriel Colunga DO   gabapentin (NEURONTIN) 300 MG capsule Take 1 capsule by mouth 3 times daily for 360 days. Patient taking differently: Take 300 mg by mouth 5 times daily.   10/28/19 10/22/20 Yes Uriel Colunga DO   dicyclomine (BENTYL) 10 MG capsule Take 1 capsule by mouth 4 times daily 8/13/19  Yes Rehana Merino MD   sucralfate (CARAFATE) 1 GM tablet Take 1 tablet by mouth 4 times daily 8/6/19  Yes Uriel Colunga DO   pantoprazole (PROTONIX) 40 MG tablet TAKE 1 TABLET BY MOUTH ONCE DAILY ( TAKE B-12 1000MG AND D3 2000MG FOR GERD) 5/19/19  Yes Historical Provider, MD   vitamin B-12 (CYANOCOBALAMIN) 1000 MCG tablet Take 1,000 mcg by mouth daily   Yes Historical Provider, MD   vitamin D (D3-1000) 1000 units CAPS Take by mouth daily   Yes Historical Provider, MD       Allergies   Allergen Reactions    Pcn [Penicillins] Rash       Social History     Socioeconomic History    Marital status: Single     Spouse name: Not on file    Number of children: Not on file    Years of education: Not on file    Highest education level: Not on file   Occupational History    Not on file   Social Needs    Financial resource strain: Not on file    Food insecurity     Worry: Not on file     Inability: Not on file    Transportation needs     Medical: Not on file     Non-medical: Not on file   Tobacco Use    Smoking status: Never Smoker    Smokeless tobacco: Never Used   Substance and Sexual Activity    Alcohol use: Yes     Comment: rare    Drug use: No    Sexual activity: Not Currently   Lifestyle    Physical activity     Days per week: Not on file     Minutes per session: Not on file    Stress: Not on file   Relationships    Social connections     Talks on phone: Not on file     Gets together: Not on file     Attends Oriental orthodox service: Not on file     Active member of club or organization: Not on file     Attends meetings of clubs or organizations: Not on file     Relationship status: Not on file    Intimate partner violence     Fear of current or ex partner: Not on file     Emotionally abused: Not on file     Physically abused: Not on file     Forced sexual activity: Not on file   Other Topics Concern    Not on file   Social History Narrative        CATARACTS    TINNITUS    GB OUT     C [address] [facility]  [address]     CVA---F  52 GF  FROM CVA AT 46    FH HTN BRO    CATH NEG     OP    TICS    BOYFRIEND [address]  1940 [address]    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 BRADLEY---CAVALEIR X RAY    CH--5   Girl struthers   Two boys [place]    Two out of town    RETIRED [employer] THEN WORKS [employer]    CVA AFFECT L FACIAL SENSATION  [facility] Dr RIVAS-----1-09    COLON 1-10----NEG BUT PREVIOUS TICS AND POLYP    TIA -11    SHINGLES R ABD 2-12    MRI  WITH L-2-3 DISC AND NERVE----DR RIVAS----OR SET UP    ADMIT 12 WITH TIA----DR Corinne Grippe DC ASA AND ADDED PLAVIX TO THE PERSANTINE    LUMBAR CHERYL OR DR RIVAS 10-12------------AGAIN DONE SEAMUS RIVAS     MOTHER  SUICIDE AGE 43    DAD  AT 52 CVA    FIRST HUS SUICIDE AT 46    X RAY 11=13 WITH NEW L-1 -2 NARROWING AND RETROLISTHESIS----SHOTS WITH DR Brooks Estimable    ANX--DEP 1-14    OA HIPS DR CHARLTON---    DIET DM 4=16    LUMBAR DISC SURGERY -16 DR RIVAS    LEFT INGUINAL HERNIA WITH MESH DR BUNCH 17    ATHEROSCLEROIS EVAL DR BROTHERS     EVAL DR ALFONSO NEWMAN--- ADIVSED ENT---PT SAYS HE TOLD HER NOT GET NEBULZIER---AND    REFUSESD FOLLOW UP    T-8 SPINAL CORD STIMULATOR TRIAL 19--------implanted permanent dr Melquiades Finn in her chronic pain thus far but does need a reprogramming, prior reprogramming has been helpful until she seems to lose the stimulation in the needed areas.                  Patient is s/p:  OPERATIONS PERFORMED:               1.  Exploration of left gluteal pocket for spinal cord stimulator                implantable pulse generator.                2.  Revision of the pocket.    3.  Replacement of implantable pulse generator and new Pocket on 01/09/2020. Rep here today and she reports some improvement after adjustment.      OARRS reviewed 10/2020  Continue Norco 5/325 BID #60, plan to wean off if able but for now not able to wean any further. Continue gabapentin as currently prescribed from outside provider, TID  Continue off tizanidine if able, can take prn if necessary.  Has refills.    Compounding pain cream not helpful  [facility] - on hold at this time. Patient encouraged to stay active  Treatment plan discussed with the patient including medication side effects     Controlled Substance Monitoring:    Acute and Chronic Pain Monitoring:   RX Monitoring 10/8/2020   Attestation -   Periodic Controlled Substance Monitoring Possible medication side effects, risk of tolerance/dependence & alternative treatments discussed. ;No signs of potential drug abuse or diversion identified. ;Assessed functional status. ;Obtaining appropriate analgesic effect of treatment.                          Plan:     We discussed with the patient that combining opioids, benzodiazepines, alcohol, illicit drugs or sleep aids increases the risk of respiratory depression including death. We discussed that these medications may cause drowsiness, sedation or dizziness and have counseled the patient not to drive or operate machinery. We have discussed that these medications will be prescribed only by one provider.  We have discussed with the patient about age related risk factors and have thoroughly discussed the importance of taking these medications as prescribed. The patient verbalizes understanding.

## 2020-11-09 ENCOUNTER — TELEPHONE (OUTPATIENT)
Dept: NEUROLOGY | Age: 80
End: 2020-11-09

## 2020-11-23 ENCOUNTER — TELEPHONE (OUTPATIENT)
Dept: PAIN MANAGEMENT | Age: 80
End: 2020-11-23

## 2020-11-23 RX ORDER — HYDROCODONE BITARTRATE AND ACETAMINOPHEN 5; 325 MG/1; MG/1
1 TABLET ORAL 2 TIMES DAILY PRN
Qty: 14 TABLET | Refills: 0 | Status: SHIPPED | OUTPATIENT
Start: 2020-11-23 | End: 2020-11-30

## 2020-11-23 NOTE — TELEPHONE ENCOUNTER
Rosio requested a refill of Norco   Last refilled on 10-15-20 for a 30 day supply  OARRS is consistent  Last office visit is 10-8-20  Future visit: Nothing scheduled  Reason for Refill: Out of medication

## 2020-11-30 RX ORDER — SIMVASTATIN 10 MG
10 TABLET ORAL DAILY
Qty: 90 TABLET | Refills: 3 | Status: SHIPPED
Start: 2020-11-30 | End: 2020-12-10 | Stop reason: SDUPTHER

## 2020-12-07 ENCOUNTER — VIRTUAL VISIT (OUTPATIENT)
Dept: PAIN MANAGEMENT | Age: 80
End: 2020-12-07
Payer: MEDICARE

## 2020-12-07 PROCEDURE — 99442 PR PHYS/QHP TELEPHONE EVALUATION 11-20 MIN: CPT | Performed by: PHYSICIAN ASSISTANT

## 2020-12-07 RX ORDER — HYDROCODONE BITARTRATE AND ACETAMINOPHEN 5; 325 MG/1; MG/1
1 TABLET ORAL 2 TIMES DAILY PRN
Qty: 60 TABLET | Refills: 0 | Status: SHIPPED
Start: 2020-12-07 | End: 2020-12-10

## 2020-12-07 RX ORDER — CARVEDILOL 12.5 MG/1
12.5 TABLET ORAL 2 TIMES DAILY WITH MEALS
Qty: 180 TABLET | Refills: 3 | Status: SHIPPED
Start: 2020-12-07 | End: 2020-12-10 | Stop reason: SDUPTHER

## 2020-12-07 NOTE — PROGRESS NOTES
PATTIE RUGGIERO [facility] - BE[facility]AVIOR[facility] HE[facility] SERVICES Pain Management  [facility]    Telephone follow up visit      Date of Visit:  12/7/2020    CC: Follow Up    Consent:  Telephone follow up due to Kala 19 pandemic   The patient and/or health care decision maker is aware that he/she may receive a bill for this telephone service, depending on his insurance coverage, and has provided verbal consent to proceed: Yes    I have considered the risks of abuse, dependence, addiction and diversion. My patient is aware that they will need a follow-up visit (in-person or virtually) at the appropriate time indicated for continued medications. Further, my patient is aware that when this acute crisis has lifted, they will be expected to return for an in-person visit and all elements of standard local and hospital guidelines in order to continue this medication. Patient location: Home   Provider Location:Office    HPI:  Pain is unchanged to right buttocks and right leg. Somewhat worse due to running out medications. Appropriate analgesia with current medications regimen: yes. Change in quality of symptoms:no. Medication side effects:none. Recent diagnostic testing:none. Recent interventional procedures:none. She has been on anticoagulation medications to include Plavix. The patient  has not been on herbal supplements. The patient is not diabetic.     Imaging:     Lumbar F/E films 12/2018 -   Posterior pedicle screw plates extend from Q75 vertebral level to the   sacrum. Bony demineralization. Previous discectomy with loss of disc   space L1-2. Retrolisthesis L1-2 and anterolisthesis L4-5 S1. Multilevel disc space narrowing. No acute osseous finding. No   instability on flexion-extension views.      Lumbar MRI 12/2018 -   Posterior pedicle screw plates extend from J02 vertebral level to the   sacrum. Bony demineralization. Previous discectomy with loss of disc   space L1-2.  Retrolisthesis L1-2 and anterolisthesis L4-5 S1.   Multilevel disc space narrowing. No acute osseous finding. No   instability on flexion-extension views.      Lumbar MRI 2016 -                   Potential Aberrant Drug-Related Behavior: no      Urine Drug Screenin2019 + hydrocodone and tramadol  2019 consistent  10/2019 consistent for hydrocodone and metabolite  2020 Consistent     OARRS report:  2019 consistent to 2019 consistent (post op medication s/p surgery)  2020 consistent to 2020 consistent     Past Medical History: Reviewed    Past Surgical History: Reviewed     Home Medications: Reviewed    Allergies: Reviewed     Social History: Reviewed     REVIEW OF SYSTEMS:     [employer] denies fever/chills, chest pain, shortness of breath, new bowel or bladder complaints. All other review of systems was negative. PHYSICAL EXAMINATION:     General:       A & O x3    Lungs:    Breathing:  No breathing abnormalities noted over the phone      Impression:    LBP and RLE pain in L5 distribution with diffuse weakness of the LLE  Multiple prior lumbar fusion surgeries with Dr. Irving Huynh, most recent 2016 extending her fusion from T10-sacrum  On PLAVIX and ASA for Hx TIA's/CVA's  Has failed multiple surgeries, injections, PT, and medications  Had staged trial with Dr. Martin Tomas 2019 and 2019, has had some improvement in her chronic pain thus far but does need a reprogramming, prior reprogramming has been helpful until she seems to lose the stimulation in the needed areas.                  Patient is s/p:  OPERATIONS PERFORMED:               1.  Exploration of left gluteal pocket for spinal cord stimulator                implantable pulse generator.                2.  Revision of the pocket.    3.  Replacement of implantable pulse generator and new Pocket on 2020.   Rep here today and she reports some improvement after adjustment.      OARRS reviewed 2020  Continue Norco 5/325 BID #60, plan to wean off if able but for now not able to wean any further.    Continue gabapentin as currently prescribed from outside provider, TID  Continue off tizanidine if able, can take prn if necessary.  Has refills.    Compounding pain cream not helpful  Aquatic PT - on hold at this time. Patient encouraged to stay active  Treatment plan discussed with the patient including medication side       Controlled Substance Monitoring:    Acute and Chronic Pain Monitoring:   RX Monitoring 12/7/2020   Attestation -   Periodic Controlled Substance Monitoring Possible medication side effects, risk of tolerance/dependence & alternative treatments discussed. ;No signs of potential drug abuse or diversion identified. ;Assessed functional status. ;Obtaining appropriate analgesic effect of treatment. We discussed with the patient that combining opioids, benzodiazepines, alcohol, illicit drugs or sleep aids increases the risk of respiratory depression including death. We discussed that these medications may cause drowsiness, sedation or dizziness and have counseled the patient not to drive or operate machinery. We have discussed that these medications will be prescribed only by one provider. We have discussed with the patient about age related risk factors and have thoroughly discussed the importance of taking these medications as prescribed. The patient verbalizes understanding. Patient advised regarding steps to help prevent the spread of COVID-19   SOURCE - https://mery-esquivel.info/. html     1-Stay home except to get medical care  2-Clean your hands often for atleast 20 secnds, avoid touching: Avoid touching your eyes, nose, and mouth with unwashed hands. 3-Seek medical attention: Seek prompt medical attention if your illness is worsening (e.g., difficulty breathing).   Call you doctor first.  3-Wear a facemask if you are sick   4-Cover your coughs and sneezes        I affirm this is a Patient Initiated Episode with an Established Patient who has not had a related appointment within my department in the past 7 days or scheduled within the next 24 hours.     Total Time: minutes: 11-20 minutes    Note: not billable if this call serves to triage the patient into an appointment for the relevant concern

## 2020-12-07 NOTE — PROGRESS NOTES
Jessica Cornejo was read the following message We want to confirm that, for purposes of billing, this is a virtual visit with your provider for which we will submit a claim for reimbursement with your insurance company. You will be responsible for any copays, coinsurance amounts or other amounts not covered by your insurance company. If you do not accept this, unfortunately we will not be able to schedule a virtual visit with the provider. Do you accept?  Rosio responded YES

## 2020-12-10 ENCOUNTER — OFFICE VISIT (OUTPATIENT)
Dept: PRIMARY CARE CLINIC | Age: 80
End: 2020-12-10
Payer: MEDICARE

## 2020-12-10 VITALS — BODY MASS INDEX: 25.69 KG/M2 | TEMPERATURE: 99.1 F | HEIGHT: 63 IN | WEIGHT: 145 LBS

## 2020-12-10 DIAGNOSIS — R73.03 PRE-DIABETES: ICD-10-CM

## 2020-12-10 DIAGNOSIS — I10 ESSENTIAL HYPERTENSION: Chronic | ICD-10-CM

## 2020-12-10 DIAGNOSIS — M81.0 AGE-RELATED OSTEOPOROSIS WITHOUT CURRENT PATHOLOGICAL FRACTURE: ICD-10-CM

## 2020-12-10 DIAGNOSIS — E03.9 ACQUIRED HYPOTHYROIDISM: ICD-10-CM

## 2020-12-10 LAB
ALBUMIN SERPL-MCNC: 4.4 G/DL (ref 3.5–5.2)
ALP BLD-CCNC: 66 U/L (ref 35–104)
ALT SERPL-CCNC: 21 U/L (ref 0–32)
ANION GAP SERPL CALCULATED.3IONS-SCNC: 12 MMOL/L (ref 7–16)
AST SERPL-CCNC: 26 U/L (ref 0–31)
BACTERIA: NORMAL /HPF
BASOPHILS ABSOLUTE: 0.04 E9/L (ref 0–0.2)
BASOPHILS RELATIVE PERCENT: 0.5 % (ref 0–2)
BILIRUB SERPL-MCNC: 0.4 MG/DL (ref 0–1.2)
BILIRUBIN URINE: NEGATIVE
BLOOD, URINE: ABNORMAL
BUN BLDV-MCNC: 14 MG/DL (ref 8–23)
CALCIUM SERPL-MCNC: 9.7 MG/DL (ref 8.6–10.2)
CHLORIDE BLD-SCNC: 101 MMOL/L (ref 98–107)
CHOLESTEROL, TOTAL: 177 MG/DL (ref 0–199)
CLARITY: CLEAR
CO2: 26 MMOL/L (ref 22–29)
COLOR: YELLOW
CREAT SERPL-MCNC: 0.8 MG/DL (ref 0.5–1)
EOSINOPHILS ABSOLUTE: 0.22 E9/L (ref 0.05–0.5)
EOSINOPHILS RELATIVE PERCENT: 2.6 % (ref 0–6)
GFR AFRICAN AMERICAN: >60
GFR NON-AFRICAN AMERICAN: >60 ML/MIN/1.73
GLUCOSE BLD-MCNC: 108 MG/DL (ref 74–99)
GLUCOSE URINE: NEGATIVE MG/DL
HBA1C MFR BLD: 5.7 % (ref 4–5.6)
HCT VFR BLD CALC: 39.4 % (ref 34–48)
HDLC SERPL-MCNC: 53 MG/DL
HEMOGLOBIN: 12.7 G/DL (ref 11.5–15.5)
IMMATURE GRANULOCYTES #: 0.05 E9/L
IMMATURE GRANULOCYTES %: 0.6 % (ref 0–5)
KETONES, URINE: NEGATIVE MG/DL
LDL CHOLESTEROL CALCULATED: 96 MG/DL (ref 0–99)
LEUKOCYTE ESTERASE, URINE: ABNORMAL
LYMPHOCYTES ABSOLUTE: 2.02 E9/L (ref 1.5–4)
LYMPHOCYTES RELATIVE PERCENT: 23.7 % (ref 20–42)
MCH RBC QN AUTO: 32.4 PG (ref 26–35)
MCHC RBC AUTO-ENTMCNC: 32.2 % (ref 32–34.5)
MCV RBC AUTO: 100.5 FL (ref 80–99.9)
MONOCYTES ABSOLUTE: 0.72 E9/L (ref 0.1–0.95)
MONOCYTES RELATIVE PERCENT: 8.4 % (ref 2–12)
NEUTROPHILS ABSOLUTE: 5.48 E9/L (ref 1.8–7.3)
NEUTROPHILS RELATIVE PERCENT: 64.2 % (ref 43–80)
NITRITE, URINE: NEGATIVE
PDW BLD-RTO: 12.1 FL (ref 11.5–15)
PH UA: 6.5 (ref 5–9)
PLATELET # BLD: 326 E9/L (ref 130–450)
PMV BLD AUTO: 9.7 FL (ref 7–12)
POTASSIUM SERPL-SCNC: 4.8 MMOL/L (ref 3.5–5)
PROTEIN UA: NEGATIVE MG/DL
RBC # BLD: 3.92 E12/L (ref 3.5–5.5)
RBC UA: NORMAL /HPF (ref 0–2)
SODIUM BLD-SCNC: 139 MMOL/L (ref 132–146)
SPECIFIC GRAVITY UA: 1.02 (ref 1–1.03)
TOTAL PROTEIN: 7.3 G/DL (ref 6.4–8.3)
TRIGL SERPL-MCNC: 138 MG/DL (ref 0–149)
TSH SERPL DL<=0.05 MIU/L-ACNC: 1.75 UIU/ML (ref 0.27–4.2)
UROBILINOGEN, URINE: 0.2 E.U./DL
VITAMIN D 25-HYDROXY: 68 NG/ML (ref 30–100)
VLDLC SERPL CALC-MCNC: 28 MG/DL
WBC # BLD: 8.5 E9/L (ref 4.5–11.5)
WBC UA: NORMAL /HPF (ref 0–5)

## 2020-12-10 PROCEDURE — G0439 PPPS, SUBSEQ VISIT: HCPCS | Performed by: FAMILY MEDICINE

## 2020-12-10 RX ORDER — SIMVASTATIN 10 MG
10 TABLET ORAL DAILY
Qty: 90 TABLET | Refills: 3 | Status: SHIPPED
Start: 2020-12-10 | End: 2021-09-10 | Stop reason: SDUPTHER

## 2020-12-10 RX ORDER — CARVEDILOL 12.5 MG/1
12.5 TABLET ORAL 2 TIMES DAILY WITH MEALS
Qty: 180 TABLET | Refills: 3 | Status: SHIPPED
Start: 2020-12-10 | End: 2021-11-12 | Stop reason: SDUPTHER

## 2020-12-10 NOTE — PATIENT INSTRUCTIONS
Personalized Preventive Plan for Sedrick Peters - 12/10/2020  Medicare offers a range of preventive health benefits. Some of the tests and screenings are paid in full while other may be subject to a deductible, co-insurance, and/or copay. Some of these benefits include a comprehensive review of your medical history including lifestyle, illnesses that may run in your family, and various assessments and screenings as appropriate. After reviewing your medical record and screening and assessments performed today your provider may have ordered immunizations, labs, imaging, and/or referrals for you. A list of these orders (if applicable) as well as your Preventive Care list are included within your After Visit Summary for your review. Other Preventive Recommendations:    · A preventive eye exam performed by an eye specialist is recommended every 1-2 years to screen for glaucoma; cataracts, macular degeneration, and other eye disorders. · A preventive dental visit is recommended every 6 months. · Try to get at least 150 minutes of exercise per week or 10,000 steps per day on a pedometer . · Order or download the FREE \"Exercise & Physical Activity: Your Everyday Guide\" from [employer]. Call 2-842.103.3959 or search [employer] online. · You need 1611-2207 mg of calcium and 5501-8119 IU of vitamin D per day. It is possible to meet your calcium requirement with diet alone, but a vitamin D supplement is usually necessary to meet this goal.  · When exposed to the sun, use a sunscreen that protects against both UVA and UVB radiation with an SPF of 30 or greater. Reapply every 2 to 3 hours or after sweating, drying off with a towel, or swimming. · Always wear a seat belt when traveling in a car. Always wear a helmet when riding a bicycle or motorcycle.

## 2020-12-10 NOTE — PROGRESS NOTES
Medicare Annual Wellness Visit  Name: Idania Bustos Date: 12/10/2020   MRN: [id number] Sex: Female   Age: [de-identified] y.o. Ethnicity: Non-/Non    : 1940 Race: [place] De [place] Cruz is here for Medicare AWV (4m)      Re back pain    bp  Chol      Low back pain  Right foot goes numb  For   Year   Tired  At   Time      No cp ro sob         Seeing pain doc    Virtual visit      Screenings for behavioral, psychosocial and functional/safety risks, and cognitive dysfunction are all negative except as indicated below. These results, as well as other patient data from the [employer] [address] form, are documented in Flowsheets linked to this Encounter. Allergies   Allergen Reactions    Pcn [Penicillins] Rash         Prior to Visit Medications    Medication Sig Taking? Authorizing Provider   simvastatin (ZOCOR) 10 MG tablet Take 1 tablet by mouth daily Yes Uriel Colunga DO   carvedilol (COREG) 12.5 MG tablet Take 1 tablet by mouth 2 times daily (with meals) Yes Uriel Colunga DO   clopidogrel (PLAVIX) 75 MG tablet Take 1 tablet by mouth daily  Uriel Colunga DO   tiZANidine (ZANAFLEX) 4 MG tablet Take 1 tablet by mouth 3 times daily  Uriel Colunga DO   losartan (COZAAR) 50 MG tablet Take 1 tablet by mouth daily  Uriel Colunga DO   citalopram (CELEXA) 10 MG tablet Take 1 tablet by mouth daily  Uriel Colunga DO   senna (SENOKOT) 8.6 MG tablet Take 1 tablet by mouth 2 times daily  Henna Ruiz PA-C   hydrochlorothiazide (HYDRODIURIL) 25 MG tablet Take 1 tablet by mouth daily  Uriel Colunga DO   gabapentin (NEURONTIN) 300 MG capsule Take 1 capsule by mouth 3 times daily for 360 days. Patient taking differently: Take 300 mg by mouth 5 times daily.    Uriel Colunga DO   dicyclomine (BENTYL) 10 MG capsule Take 1 capsule by mouth 4 times daily  Makeda Reynolds MD   pantoprazole (PROTONIX) 40 MG tablet TAKE 1 TABLET BY MOUTH ONCE DAILY ( TAKE B-12 1000MG AND D3 2000MG FOR GERD)  Historical Provider, MD   vitamin B-12 (CYANOCOBALAMIN) 1000 MCG tablet Take 1,000 mcg by mouth daily  Historical Provider, MD   vitamin D (D3-1000) 1000 units CAPS Take by mouth daily  Historical Provider, MD         Past Medical History:   Diagnosis Date    Anxiety     CVA (cerebral vascular accident) ([address]     L FACIAL SENSATION    Depression     Diverticulitis     Hyperlipidemia     Hypertension     OA (osteoarthritis) of hip     OP (osteoporosis)     Shingles     R ABD    TIA (transient ischemic attack)     Tinnitus     Wears partial dentures     upper       Past Surgical History:   Procedure Laterality Date    BACK SURGERY      x3    CHOLECYSTECTOMY      COLONOSCOPY N/A 8/28/2019    COLONOSCOPY WITH BIOPSY performed by Verena Todd MD at [address] COLONOSCOPY  8/28/2019    COLONOSCOPY POLYPECTOMY SNARE/COLD BIOPSY performed by Verena Todd MD at [address] [place] [address] 63      kuldip lense implant    HERNIA REPAIR  2018    HYSTERECTOMY      LUMBAR LAMINECTOMY      SPINAL CORD STIMULATOR IMPLANTATION N/A 5/6/2019    T8 SPINAL CORD STIMULATOR  TRIAL PLACEMENT--SUNITHA, KODAK TABLE, [employer] performed by Geoff Zimmerman MD at [address] [place] [address] N/A 5/14/2019    PLACEMENT OF  PERMANENT SPINAL CORD STIMULATOR---XRAY, MeroArte TABLE, BOST[employer]ENT[employer] performed by Geoff Zimmerman MD at [address] [place] [address] N/A 1/9/2020    REVISION OF SPINAL CORD STIMULATOR  BATTERY AND POCKET performed by Geoff Zimmerman MD at [address] [facility] ENDOSCOPY N/A 8/28/2019    EGD BIOPSY performed by Verena Todd MD at [facility] ENDOSCOPY         Family History   Problem Relation Age of Onset    Stroke Father        CareTeam (Including outside providers/suppliers regularly involved in providing care):   Patient Care Team:  Amy Myers DO as PCP - [facility] Dalton Rinaldi DO as PCP - [facility] Empaneled Provider    Wt Readings from Last 3 Encounters:   12/10/20 145 lb (65.8 kg)   10/08/20 142 lb (64.4 kg)   09/10/20 142 lb (64.4 kg)     Vitals:    12/10/20 0859   Temp: 99.1 °F (37.3 °C)   TempSrc: Oral   Weight: 145 lb (65.8 kg)   Height: 5' 3\" (1.6 m)     Body mass index is 25.69 kg/m². Based upon direct observation of the patient, evaluation of cognition reveals recent and remote memory intact. General Appearance: alert and oriented to person, place and time, well developed and well- nourished, in no acute distress  Skin: warm and dry, no rash or erythema  Head: normocephalic and atraumatic  Eyes: pupils equal, round, and reactive to light, extraocular eye movements intact, conjunctivae normal  ENT: tympanic membrane, external ear and ear canal normal bilaterally, nose without deformity, nasal mucosa and turbinates normal without polyps  Neck: supple and non-tender without mass, no thyromegaly or thyroid nodules, no cervical lymphadenopathy  Pulmonary/Chest: clear to auscultation bilaterally- no wheezes, rales or rhonchi, normal air movement, no respiratory distress  Cardiovascular: normal rate, regular rhythm, normal S1 and S2, no murmurs, rubs, clicks, or gallops, distal pulses intact, no carotid bruits  Abdomen: soft, non-tender, non-distended, normal bowel sounds, no masses or organomegaly  Extremities: no cyanosis, clubbing or edema  Musculoskeletal: marked  Dec rom  lubamr spine  Neurologic: reflexes normal and symmetric, no cranial nerve deficit, gait, coordination and speech normal    Patient's complete Health Risk Assessment and screening values have been reviewed and are found in Flowsheets. The following problems were reviewed today and where indicated follow up appointments were made and/or referrals ordered. Positive Risk Factor Screenings with Interventions:     No Positive Risk Factors identified today.     Personalized Preventive Plan   Current Health Maintenance Status  Immunization History Administered Date(s) Administered    Influenza Virus Vaccine 10/18/2010, 09/13/2013, 10/02/2014, 10/14/2015, 11/04/2016    Influenza, High Dose (Fluzone 65 yrs and older) 10/09/2018    Influenza, Clover Hamburger, IM, (6 mo and older Fluzone, Flulaval, Fluarix and 3 yrs and older Afluria) 10/22/2020    Influenza, Triv, inactivated, subunit, adjuvanted, IM (Fluad 65 yrs and older) 09/30/2017, 10/24/2019    Pneumococcal Conjugate 13-valent ([id number]) 10/09/2018    Pneumococcal Polysaccharide ([id number]) 10/24/2019        Health Maintenance   Topic Date Due    DTaP/Tdap/Td vaccine (1 - Tdap) 05/29/1959    Shingles Vaccine (1 of 2) 05/29/1990    Annual Wellness Visit (AWV)  06/20/2019    Lipid screen  08/10/2021    Potassium monitoring  08/10/2021    Creatinine monitoring  08/10/2021    DEXA (modify frequency per FRAX score)  Completed    Flu vaccine  Completed    Pneumococcal 65+ years Vaccine  Completed    Hepatitis A vaccine  Aged Out    Hib vaccine  Aged Out    Meningococcal (ACWY) vaccine  Aged Out     Recommendations for [employer] Payment Due: see orders and patient instructions/AVS.  . Recommended screening schedule for the next 5-10 years is provided to the patient in written form: see Patient Instructions/AVS.    Bud Monroy was seen today for medicare awv. Diagnoses and all orders for this visit:    Routine general medical examination at a health care facility    Mixed hyperlipidemia  -     simvastatin (ZOCOR) 10 MG tablet; Take 1 tablet by mouth daily    Essential hypertension  -     carvedilol (COREG) 12.5 MG tablet; Take 1 tablet by mouth 2 times daily (with meals)  -     CBC Auto Differential; Future  -     Comprehensive Metabolic Panel; Future  -     Lipid Panel; Future  -     Urinalysis; Future    Primary osteoarthritis involving multiple joints    Chronic pain syndrome    Pre-diabetes  -     Hemoglobin A1C; Future    Acquired hypothyroidism  -     TSH without Reflex;  Future  -     T4; Future    Age-related osteoporosis without current pathological fracture  -     Vitamin D 25 Hydroxy; Future            Diet exer  Lab today   rom exer taguht      lwo fat diet    fuwith pain doc      cotn statin    Ck bp home    Qd     A great deal of time spent reviewing medications, diet, exercise, social issues. Also reviewing the chart before entering the room with patient and finishing charting after leaving patient's room. More than half of that time was spent face to face with the patient in counseling and coordinating care.       6 molab b efore

## 2020-12-11 LAB — T4 TOTAL: 6.5 MCG/DL (ref 4.5–11.7)

## 2020-12-14 ENCOUNTER — TELEPHONE (OUTPATIENT)
Dept: PRIMARY CARE CLINIC | Age: 80
End: 2020-12-14

## 2021-01-12 ENCOUNTER — VIRTUAL VISIT (OUTPATIENT)
Dept: PAIN MANAGEMENT | Age: 81
End: 2021-01-12
Payer: MEDICARE

## 2021-01-12 DIAGNOSIS — G89.29 CHRONIC BILATERAL LOW BACK PAIN WITH RIGHT-SIDED SCIATICA: Primary | ICD-10-CM

## 2021-01-12 DIAGNOSIS — M54.41 CHRONIC BILATERAL LOW BACK PAIN WITH RIGHT-SIDED SCIATICA: Primary | ICD-10-CM

## 2021-01-12 DIAGNOSIS — M54.16 LUMBAR RADICULOPATHY: ICD-10-CM

## 2021-01-12 DIAGNOSIS — F45.1 SOMATIC SYMPTOM DISORDER, PERSISTENT, SEVERE, WITH PREDOMINANT PAIN: ICD-10-CM

## 2021-01-12 DIAGNOSIS — Z91.81 AT HIGH RISK FOR FALLS: ICD-10-CM

## 2021-01-12 DIAGNOSIS — M96.1 LUMBAR POST-LAMINECTOMY SYNDROME: ICD-10-CM

## 2021-01-12 DIAGNOSIS — G89.4 CHRONIC PAIN SYNDROME: ICD-10-CM

## 2021-01-12 PROCEDURE — 99442 PR PHYS/QHP TELEPHONE EVALUATION 11-20 MIN: CPT | Performed by: PHYSICIAN ASSISTANT

## 2021-01-12 RX ORDER — HYDROCODONE BITARTRATE AND ACETAMINOPHEN 5; 325 MG/1; MG/1
1 TABLET ORAL 2 TIMES DAILY PRN
Qty: 60 TABLET | Refills: 0 | Status: SHIPPED
Start: 2021-01-12 | End: 2021-02-10 | Stop reason: SDUPTHER

## 2021-01-12 NOTE — TELEPHONE ENCOUNTER
Pt is requesting a refill for gabapentin. There are 2 sets of directions in her chart, one says to take 300 mg tid and the other says to take 300 mg 5 times a day. Pt is very confused and is not sure how she was taking the medication. I spoke to the pharmacy and the last script that was sent in was in October 2019 for 300 mg tid but they said that the pt never picked the script up. Pain management advised pt to contact our office to see if you can refill to help with her foot and leg pain. Do you prefer to see pt since it has been so long.

## 2021-01-12 NOTE — PROGRESS NOTES
Namrata Lam was read the following message We want to confirm that, for purposes of billing, this is a virtual visit with your provider for which we will submit a claim for reimbursement with your insurance company. You will be responsible for any copays, coinsurance amounts or other amounts not covered by your insurance company. If you do not accept this, unfortunately we will not be able to schedule a virtual visit with the provider. Do you accept?  Rosio responded YES

## 2021-01-12 NOTE — PROGRESS NOTES
[facility]  [facility]    Telephone follow up visit      Date of Visit:  1/12/2021    CC: Follow Up    Consent:  Telephone follow up due to Kala 19 pandemic   The patient and/or health care decision maker is aware that he/she may receive a bill for this telephone service, depending on his insurance coverage, and has provided verbal consent to proceed: Yes    I have considered the risks of abuse, dependence, addiction and diversion. My patient is aware that they will need a follow-up visit (in-person or virtually) at the appropriate time indicated for continued medications. Further, my patient is aware that when this acute crisis has lifted, they will be expected to return for an in-person visit and all elements of standard local and hospital guidelines in order to continue this medication. Patient location: Home   Provider Location:Office    HPI:  Pain is worse due to running out of norco.   Reports that it helps a lot. Appropriate analgesia with current medications regimen: yes. Change in quality of symptoms:no. Medication side effects:none. Recent diagnostic testing:none. Recent interventional procedures:none. She has been on anticoagulation medications to include Plavix. The patient  has not been on herbal supplements. The patient is not diabetic.     Imaging:     Lumbar F/E films 12/2018 -   Posterior pedicle screw plates extend from W46 vertebral level to the   sacrum. Bony demineralization. Previous discectomy with loss of disc   space L1-2. Retrolisthesis L1-2 and anterolisthesis L4-5 S1. Multilevel disc space narrowing. No acute osseous finding. No   instability on flexion-extension views.      Lumbar MRI 12/2018 -   Posterior pedicle screw plates extend from S86 vertebral level to the   sacrum. Bony demineralization. Previous discectomy with loss of disc   space L1-2. Retrolisthesis L1-2 and anterolisthesis L4-5 S1.    Multilevel disc space narrowing. No acute osseous finding. No   instability on flexion-extension views.      Lumbar MRI 2016 -                   Potential Aberrant Drug-Related Behavior: no      Urine Drug Screenin2019 + hydrocodone and tramadol  2019 consistent  10/2019 consistent for hydrocodone and metabolite  2020 Consistent     OARRS report:  2019 consistent to 2019 consistent (post op medication s/p surgery)  2020 consistent to 2021 consistent     Past Medical History: Reviewed    Past Surgical History: Reviewed     Home Medications: Reviewed    Allergies: Reviewed     Social History: Reviewed     REVIEW OF SYSTEMS:     Alray Sessions denies fever/chills, chest pain, shortness of breath, new bowel or bladder complaints. All other review of systems was negative. PHYSICAL EXAMINATION:     General:       A & O x3    Lungs:    Breathing:  No breathing abnormalities noted over the phone      Impression:    LBP and RLE pain in L5 distribution with diffuse weakness of the LLE  Multiple prior lumbar fusion surgeries with Dr. Tia Hanna, most recent 2016 extending her fusion from T10-sacrum  On PLAVIX and ASA for Hx TIA's/CVA's  Has failed multiple surgeries, injections, PT, and medications  Had staged trial with Dr. Gale Mccain 2019 and 2019, has had some improvement in her chronic pain thus far but does need a reprogramming, prior reprogramming has been helpful until she seems to lose the stimulation in the needed areas.                  Patient is s/p:  OPERATIONS PERFORMED:               1.  Exploration of left gluteal pocket for spinal cord stimulator                implantable pulse generator.                2.  Revision of the pocket.    3.  Replacement of implantable pulse generator and new Pocket on   2020.   Rep here today and she reports some improvement   after adjustment.      OARRS reviewed 2021  Continue Norco 5/325 BID #60, plan to wean off if able but for now not able to wean any further.    Continue gabapentin as currently prescribed from outside provider, TID  Continue off tizanidine if able, can take prn if necessary.  Has refills.    Compounding pain cream - ineffective  Aquatic PT - on hold at this time. Patient encouraged to stay active  Treatment plan discussed with the patient including medication side     Controlled Substance Monitoring:    Acute and Chronic Pain Monitoring:   RX Monitoring 1/12/2021   Attestation -   Periodic Controlled Substance Monitoring Possible medication side effects, risk of tolerance/dependence & alternative treatments discussed. ;No signs of potential drug abuse or diversion identified. ;Assessed functional status. ;Obtaining appropriate analgesic effect of treatment. We discussed with the patient that combining opioids, benzodiazepines, alcohol, illicit drugs or sleep aids increases the risk of respiratory depression including death. We discussed that these medications may cause drowsiness, sedation or dizziness and have counseled the patient not to drive or operate machinery. We have discussed that these medications will be prescribed only by one provider. We have discussed with the patient about age related risk factors and have thoroughly discussed the importance of taking these medications as prescribed. The patient verbalizes understanding. Patient advised regarding steps to help prevent the spread of COVID-19   SOURCE - https://mery-esquivel.info/. html     1-Stay home except to get medical care  2-Clean your hands often for atleast 20 secnds, avoid touching: Avoid touching your eyes, nose, and mouth with unwashed hands. 3-Seek medical attention: Seek prompt medical attention if your illness is worsening (e.g., difficulty breathing).   Call you doctor first.  3-Wear a facemask if you are sick   4-Cover your coughs and sneezes        I affirm this is a Patient Initiated Episode with an Established Patient who has not had a related appointment within my department in the past 7 days or scheduled within the next 24 hours.     Total Time: minutes: 11-20 minutes    Note: not billable if this call serves to triage the patient into an appointment for the relevant concern

## 2021-01-13 RX ORDER — GABAPENTIN 300 MG/1
300 CAPSULE ORAL
Qty: 450 CAPSULE | Refills: 3 | Status: SHIPPED
Start: 2021-01-13 | End: 2021-07-02 | Stop reason: SDUPTHER

## 2021-01-28 ENCOUNTER — IMMUNIZATION (OUTPATIENT)
Dept: PRIMARY CARE CLINIC | Age: 81
End: 2021-01-28
Payer: MEDICARE

## 2021-01-28 PROCEDURE — 0001A COVID-19, PFIZER VACCINE 30MCG/0.3ML DOSE: CPT | Performed by: NURSE PRACTITIONER

## 2021-01-28 PROCEDURE — 91300 COVID-19, PFIZER VACCINE 30MCG/0.3ML DOSE: CPT | Performed by: NURSE PRACTITIONER

## 2021-02-04 RX ORDER — MEMANTINE HYDROCHLORIDE 5 MG/1
5 TABLET ORAL 2 TIMES DAILY
Qty: 60 TABLET | Refills: 5 | Status: SHIPPED
Start: 2021-02-04 | End: 2021-04-27 | Stop reason: SDUPTHER

## 2021-02-09 NOTE — PROGRESS NOTES
[facility]  [address]    Follow up Note      Marcelle Shipman     Date of Visit:  2/10/2021    CC:  Patient presents for follow up   Chief Complaint   Patient presents with    Lower Back Pain       HPI:    Pain is unchanged. No new changes. Appropriate analgesia with current medications regimen: Yes. Change in quality of symptoms: no   Medication side effects:none. Recent diagnostic testing:none. Recent interventional procedures:none. She has been on anticoagulation medications to include Plavix. The patient  has not been on herbal supplements. The patient is not diabetic. Imaging:     Lumbar F/E films 2018 -   Posterior pedicle screw plates extend from A38 vertebral level to the   sacrum. Bony demineralization. Previous discectomy with loss of disc   space L1-2. Retrolisthesis L1-2 and anterolisthesis L4-5 S1. Multilevel disc space narrowing. No acute osseous finding. No   instability on flexion-extension views.      Lumbar MRI 2018 -   Posterior pedicle screw plates extend from G58 vertebral level to the   sacrum. Bony demineralization. Previous discectomy with loss of disc   space L1-2. Retrolisthesis L1-2 and anterolisthesis L4-5 S1. Multilevel disc space narrowing. No acute osseous finding.  No   instability on flexion-extension views.      Lumbar MRI 2016 -                   Potential Aberrant Drug-Related Behavior: no      Urine Drug Screenin2019 + hydrocodone and tramadol  2019 consistent  10/2019 consistent for hydrocodone and metabolite  2020 Consistent     OARRS report:  2019 consistent to 2019 consistent (post op medication s/p surgery)  2020 consistent to 2021 consistent    Opioid Agreement:  10/08/2020       Past Medical History:   Diagnosis Date    Anxiety     CVA [facility]cerebral vascular accident) ([facility] [address]     L FACIAL SENSATION    Depression     Diverticulitis     Hyperlipidemia     Hypertension  OA (osteoarthritis) of hip     OP (osteoporosis)     Shingles     R ABD    TIA (transient ischemic attack)     Tinnitus     Wears partial dentures     upper       Past Surgical History:   Procedure Laterality Date    BACK SURGERY      x3    CHOLECYSTECTOMY      COLONOSCOPY N/A 8/28/2019    COLONOSCOPY WITH BIOPSY performed by Manfred Christianson MD at [address] COLONOSCOPY  8/28/2019    COLONOSCOPY POLYPECTOMY SNARE/COLD BIOPSY performed by Manfred Christianson MD at [address] [facility] [address]      kuldip lense implant    HERNIA REPAIR  2018    HYSTERECTOMY      LUMBAR LAMINECTOMY      SPINAL CORD STIMULATOR IMPLANTATION N/A 5/6/2019    T8 SPINAL CORD STIMULATOR  TRIAL PLACEMENT--SUNITHA, KODAK TABLE, [employer] performed by Jony Shay MD at [address] [place] [address] N/A 5/14/2019    PLACEMENT OF  PERMANENT SPINAL CORD STIMULATOR---XRAY, KODAK TABLE, BOST[employer] performed by Jony Shay MD at [address] [place] [address] N/A 1/9/2020    REVISION OF SPINAL CORD STIMULATOR  BATTERY AND POCKET performed by Jony Shay MD at [address] N/A 8/28/2019    EGD BIOPSY performed by Manfred Christianson MD at [facility]       Prior to Admission medications    Medication Sig Start Date End Date Taking? Authorizing Provider   HYDROcodone-acetaminophen (NORCO) 5-325 MG per tablet Take 1 tablet by mouth 2 times daily as needed for Pain for up to 30 days. Intended supply: 30 days 2/11/21 3/13/21 Yes SEBASTIÁN Castro   memantine [facility]) 5 MG tablet Take 1 tablet by mouth 2 times daily For memory 2/4/21  Yes Uriel Colunga DO   gabapentin (NEURONTIN) 300 MG capsule Take 1 capsule by mouth 5 times daily for 360 days.  1/13/21 1/8/22 Yes Uriel Colunga DO   simvastatin (ZOCOR) 10 MG tablet Take 1 tablet by mouth daily 12/10/20  Yes Uriel Colunga DO   carvedilol (COREG) 12.5 MG tablet Take 1 tablet by mouth 2 times daily (with meals) 12/10/20  Yes Uriel Colunga DO   clopidogrel (PLAVIX) 75 MG tablet Take 1 tablet by mouth daily 9/8/20  Yes Uriel Colunga DO   tiZANidine (ZANAFLEX) 4 MG tablet Take 1 tablet by mouth 3 times daily 6/18/20  Yes Uriel Colunga DO   losartan (COZAAR) 50 MG tablet Take 1 tablet by mouth daily 2/12/20  Yes Uriel Colunga DO   citalopram (CELEXA) 10 MG tablet Take 1 tablet by mouth daily 2/2/20  Yes Uriel Colunga DO   hydrochlorothiazide (HYDRODIURIL) 25 MG tablet Take 1 tablet by mouth daily 10/28/19  Yes Uriel Colunga DO   dicyclomine (BENTYL) 10 MG capsule Take 1 capsule by mouth 4 times daily 8/13/19  Yes Alessandro Posada MD   pantoprazole (PROTONIX) 40 MG tablet TAKE 1 TABLET BY MOUTH ONCE DAILY ( TAKE B-12 1000MG AND D3 2000MG FOR GERD) 5/19/19  Yes Historical Provider, MD   vitamin B-12 (CYANOCOBALAMIN) 1000 MCG tablet Take 1,000 mcg by mouth daily    Historical Provider, MD   vitamin D (D3-1000) 1000 units CAPS Take by mouth daily    Historical Provider, MD       Allergies   Allergen Reactions    Pcn [Penicillins] Rash       Social History     Socioeconomic History    Marital status: Single     Spouse name: Not on file    Number of children: Not on file    Years of education: Not on file    Highest education level: Not on file   Occupational History    Not on file   Social Needs    Financial resource strain: Not on file    Food insecurity     Worry: Not on file     Inability: Not on file    Transportation needs     Medical: Not on file     Non-medical: Not on file   Tobacco Use    Smoking status: Never Smoker    Smokeless tobacco: Never Used   Substance and Sexual Activity    Alcohol use: Yes     Comment: rare    Drug use: No    Sexual activity: Not Currently   Lifestyle    Physical activity     Days per week: Not on file     Minutes per session: Not on file    Stress: Not on file   Relationships    Social connections     Talks on phone: Not on file     Gets together: Not on file     Attends Jewish service: Not on file     Active member of club or organization: Not on file     Attends meetings of clubs or organizations: Not on file     Relationship status: Not on file    Intimate partner violence     Fear of current or ex partner: Not on file     Emotionally abused: Not on file     Physically abused: Not on file     Forced sexual activity: Not on file   Other Topics Concern    Not on file   Social History Narrative        CATARACTS    TINNITUS    GB OUT     [address]     CVA---F  52 GF  FROM CVA AT 46    FH HTN BRO    CATH NEG     OP    TICS    BOYFRIEND [address]  1940 [address]    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 BRADLEY---CAVALEIR X RAY    CH--5   Girl struthers   Two boys kinsman    Two out of town    [facility] [place] [facility]    CVA AFFECT L FACIAL SENSATION  Sergei RIVAS------09    COLON 1-10----NEG BUT PREVIOUS TICS AND POLYP    TIA -    SHINGLES R ABD 2-12    MRI  WITH L-2-3 [facility] AND NERVE----DR RIVAS----OR SET UP    ADMIT  WITH TIA----DR Cherl Cabot DC ASA AND ADDED PLAVIX TO THE PERSANTINE    LUMBAR CHERYL OR DR RIVAS 10-12------------AGAIN DONE SEAMUS RIVAS 16    MOTHER  SUICIDE AGE 43    DAD  AT 52 CVA    FIRST HUS SUICIDE AT 52    X RAY 11=13 WITH NEW L-1 -2 NARROWING AND RETROLISTHESIS----SHOTS WITH DR Afshan Vincent    ANX--DEP 1-14    OA HIPS DR CHARLTON---    DIET DM 4=16    LUMBAR DISC SURGERY -16 DR RIVAS    LEFT INGUINAL HERNIA WITH MESH DR BUNCH     ATHEROSCLEROIS EVAL DR BROTHERS     EVAL DR ALFONSO NEWMAN--- ADIVSED ENT---PT SAYS HE TOLD HER NOT GET NEBULZIER---AND    REFUSESD FOLLOW UP    T-8 SPINAL CORD STIMULATOR TRIAL 19--------implanted permanent dr Saul Rodas  ---device moved on buttock   1-20    PAIN MEDS WITH DR Alphonso Mathew PAIN MEKA R [facility]    Son with  Lung ca and brain mets  [address] [place]    eval dr Dimitris Murray for dementia    Pain management----------------pain doc monthly       Family History   Problem Relation Age of Onset    Stroke Father        REVIEW OF SYSTEMS:     Rick Dakins denies fever/chills, chest pain, shortness of breath, new bowel or bladder complaints. All other review of systems was negative. PHYSICAL EXAMINATION:      /80   Pulse 77   Temp 97.1 °F (36.2 °C) (Infrared)   Resp 16   Ht 5' 3\" (1.6 m)   Wt 145 lb (65.8 kg)   SpO2 97%   BMI 25.69 kg/m²     General:      General appearance:   pleasant and well-hydrated. , in no discomfort and A & O x3  Build:Normal Weight  Function:Rises from a seated position with difficulty    HEENT:    Head:normocephalic and atraumatic  Pupils:regular, round and equal.  Sclera: icterus absent,    Lungs:    Breathing:Normal expansion. Clear to auscultation. No rales, rhonchi, or wheezing. Abdomen:    Shape:non-distended and normal  Tenderness:none  Guarding:none      Lumbar spine:    Range of motion:abnormal mildly Lateral bending, flexion, extension rotation bilateral and is painful. Extremities:    Tremors:None bilaterally upper and lower  Range of motion:Generally normal shoulders, pain with internal rotation of hips negative.   Intact:Yes  Varicose veins:absent   Cyanosis:none  Edema:Normal    Neurological:    Sensory:normal to light touch   Motor:   [address]    Dermatology:    Skin:no unusual rashes, no skin lesions, no palpable subcutaneous nodules and good skin turgor    Impression:      LBP and RLE pain in L5 distribution with diffuse weakness of the LLE  Multiple prior lumbar fusion surgeries with Dr. Julieta Velasco, most recent 9/2016 extending her fusion from T10-sacrum  On PLAVIX and ASA for Hx TIA's/CVA's  Has failed multiple surgeries, injections, PT, and medications  Had staged trial with Dr. Marlen Apple 5/6/2019 and 5/14/2019, has had some improvement in her chronic pain thus far but does need a reprogramming, prior reprogramming has been helpful until she seems to lose the stimulation in the needed areas.                  Patient is s/p:  OPERATIONS PERFORMED:               1.  Exploration of left gluteal pocket for spinal cord stimulator                implantable pulse generator.                2.  Revision of the pocket.    3.  Replacement of implantable pulse generator and new Pocket on   01/09/2020.  Rep here today and she reports some improvement   after adjustment.      OARRS reviewed 02/2021  UDS ordered today  Continue Norco 5/325 BID #60, plan to wean off if able but for now not able to wean any further.    Continue gabapentin as currently prescribed from outside provider, TID  Continue off tizanidine if able, can take prn if necessary.  Has refills.    Compounding pain cream - ineffective  Aquatic PT - on hold at this time. Patient encouraged to stay active  Treatment plan discussed with the patient including medication side        Controlled Substance Monitoring:    Acute and Chronic Pain Monitoring:   RX Monitoring 2/10/2021   Attestation -   Periodic Controlled Substance Monitoring Possible medication side effects, risk of tolerance/dependence & alternative treatments discussed. ;No signs of potential drug abuse or diversion identified. ;Assessed functional status. ;Obtaining appropriate analgesic effect of treatment. ;Random urine drug screen sent today.                        We discussed with the patient that combining opioids, benzodiazepines, alcohol, illicit drugs or sleep aids increases the risk of respiratory depression including death. We discussed that these medications may cause drowsiness, sedation or dizziness and have counseled the patient not to drive or operate machinery. We have discussed that these medications will be prescribed only by one provider. We have discussed with the patient about age related risk factors and have thoroughly discussed the importance of taking these medications as prescribed.  The patient

## 2021-02-10 ENCOUNTER — OFFICE VISIT (OUTPATIENT)
Dept: PAIN MANAGEMENT | Age: 81
End: 2021-02-10
Payer: MEDICARE

## 2021-02-10 VITALS
WEIGHT: 145 LBS | DIASTOLIC BLOOD PRESSURE: 80 MMHG | SYSTOLIC BLOOD PRESSURE: 136 MMHG | HEIGHT: 63 IN | OXYGEN SATURATION: 97 % | TEMPERATURE: 97.1 F | BODY MASS INDEX: 25.69 KG/M2 | HEART RATE: 77 BPM | RESPIRATION RATE: 16 BRPM

## 2021-02-10 DIAGNOSIS — M96.1 LUMBAR POST-LAMINECTOMY SYNDROME: ICD-10-CM

## 2021-02-10 DIAGNOSIS — M54.16 LUMBAR RADICULOPATHY: ICD-10-CM

## 2021-02-10 DIAGNOSIS — G89.4 CHRONIC PAIN SYNDROME: ICD-10-CM

## 2021-02-10 DIAGNOSIS — Z91.81 AT HIGH RISK FOR FALLS: ICD-10-CM

## 2021-02-10 DIAGNOSIS — M54.41 CHRONIC BILATERAL LOW BACK PAIN WITH RIGHT-SIDED SCIATICA: Primary | ICD-10-CM

## 2021-02-10 DIAGNOSIS — F45.1 SOMATIC SYMPTOM DISORDER, PERSISTENT, SEVERE, WITH PREDOMINANT PAIN: ICD-10-CM

## 2021-02-10 DIAGNOSIS — G89.29 CHRONIC BILATERAL LOW BACK PAIN WITH RIGHT-SIDED SCIATICA: Primary | ICD-10-CM

## 2021-02-10 PROCEDURE — 99213 OFFICE O/P EST LOW 20 MIN: CPT | Performed by: PHYSICIAN ASSISTANT

## 2021-02-10 RX ORDER — HYDROCODONE BITARTRATE AND ACETAMINOPHEN 5; 325 MG/1; MG/1
1 TABLET ORAL 2 TIMES DAILY PRN
Qty: 60 TABLET | Refills: 0 | Status: SHIPPED
Start: 2021-02-11 | End: 2021-03-11 | Stop reason: SDUPTHER

## 2021-02-10 NOTE — PROGRESS NOTES
Do you currently have any of the following:    Fever: No  Headache:  No  Cough: No  Shortness of breath: No  Exposed to anyone with these symptoms: No                                                                                                                Karolyn Solorio presents to the [facility] on 2/10/2021. Janel Shaikh is complaining of pain in lower back, right foot is numb. . The pain is constant. The pain is described as aching, throbbing, numb and right foot tingles. . Pain is rated on her best day at a 5, on her worst day at a 9, and on average at a 7 on the VAS scale. She took her last dose of Norco last evening. Ravi Moore does not have issues with constipation. Any procedures since your last visit: No,    She is not on NSAIDS and  is  on anticoagulation medications to include Plavix and is managed by Pallavi Robb DO  . Pacemaker or defibrillator: No Physician managing device is NA. Medication Contract and Consent for Opioid Use Documents Filed     Patient Documents       Type of Document Status Date Received Received By Description     Medication Contract Received 12/18/2018  8:06 AM DAFNE MALONE PAIN MANAGEMENT PT AGREEMENT 12/18/2018     Medication Contract Received 5/4/2019 10:42 AM Naina Gong Neurosurgery medication contract     Medication Contract Received 10/8/2020  2:56 PM Lorena KANG 2020/010/08 paina greement                    /80   Pulse 77   Temp 97.1 °F (36.2 °C) (Infrared)   Resp 16   Ht 5' 3\" (1.6 m)   Wt 145 lb (65.8 kg)   SpO2 97%   BMI 25.69 kg/m²      No LMP recorded. Patient has had a hysterectomy.

## 2021-02-13 LAB
7-AMINOCLONAZEPAM, URINE: <5 NG/ML
ALPHA-HYDROXYALPRAZOLAM, URINE: <5 NG/ML
ALPHA-HYDROXYMIDAZOLAM, URINE: <20 NG/ML
ALPRAZOLAM, URINE: <5 NG/ML
CHLORDIAZEPOXIDE, URINE: <20 NG/ML
CLONAZEPAM, URINE: <5 NG/ML
DIAZEPAM, URINE: <20 NG/ML
LORAZEPAM, URINE: <20 NG/ML
MIDAZOLAM, URINE: <20 NG/ML
NORDIAZEPAM, URINE: <20 NG/ML
OXAZEPAM, URINE: <20 NG/ML
TEMAZEPAM, URINE: <20 NG/ML

## 2021-02-14 LAB
6AM URINE: <10 NG/ML
CODEINE, URINE: <20 NG/ML
HYDROCODONE, URINE: 612 NG/ML
HYDROMORPHONE, URINE: <20 NG/ML
MORPHINE URINE: <20 NG/ML
NORHYDROCODONE, URINE: 558 NG/ML
NOROXYCODONE, URINE: <20 NG/ML
NOROXYMORPHONE, URINE: <20 NG/ML
OXYCODONE, URINE CONFIRMATION: <20 NG/ML
OXYMORPHONE, URINE: <20 NG/ML

## 2021-02-16 LAB
Lab: NORMAL
REPORT: NORMAL
THIS TEST SENT TO: NORMAL

## 2021-02-18 ENCOUNTER — IMMUNIZATION (OUTPATIENT)
Dept: PRIMARY CARE CLINIC | Age: 81
End: 2021-02-18
Payer: MEDICARE

## 2021-02-18 PROCEDURE — 0002A PR IMM ADMN SARSCOV2 30MCG/0.3ML DIL RECON 2ND DOSE: CPT | Performed by: NURSE PRACTITIONER

## 2021-02-18 PROCEDURE — 91300 COVID-19, PFIZER VACCINE 30MCG/0.3ML DOSE: CPT | Performed by: NURSE PRACTITIONER

## 2021-02-23 RX ORDER — CITALOPRAM 10 MG/1
10 TABLET ORAL DAILY
Qty: 90 TABLET | Refills: 3 | Status: CANCELLED | OUTPATIENT
Start: 2021-02-23

## 2021-02-23 RX ORDER — CITALOPRAM 10 MG/1
10 TABLET ORAL DAILY
Qty: 90 TABLET | Refills: 3 | Status: SHIPPED
Start: 2021-02-23 | End: 2021-07-02 | Stop reason: SDUPTHER

## 2021-03-02 ENCOUNTER — TELEPHONE (OUTPATIENT)
Dept: NEUROLOGY | Age: 81
End: 2021-03-02

## 2021-03-02 NOTE — TELEPHONE ENCOUNTER
Left message for patient and informed her that her neuropsychology testing needs to be complete before coming in for a follow up with Dr. Venessa Trivedi. Call back number given.

## 2021-03-11 ENCOUNTER — VIRTUAL VISIT (OUTPATIENT)
Dept: PAIN MANAGEMENT | Age: 81
End: 2021-03-11
Payer: MEDICARE

## 2021-03-11 DIAGNOSIS — F45.1 SOMATIC SYMPTOM DISORDER, PERSISTENT, SEVERE, WITH PREDOMINANT PAIN: ICD-10-CM

## 2021-03-11 DIAGNOSIS — M54.41 CHRONIC BILATERAL LOW BACK PAIN WITH RIGHT-SIDED SCIATICA: Primary | ICD-10-CM

## 2021-03-11 DIAGNOSIS — M96.1 LUMBAR POST-LAMINECTOMY SYNDROME: ICD-10-CM

## 2021-03-11 DIAGNOSIS — G89.4 CHRONIC PAIN SYNDROME: ICD-10-CM

## 2021-03-11 DIAGNOSIS — Z91.81 AT HIGH RISK FOR FALLS: ICD-10-CM

## 2021-03-11 DIAGNOSIS — G89.29 CHRONIC BILATERAL LOW BACK PAIN WITH RIGHT-SIDED SCIATICA: Primary | ICD-10-CM

## 2021-03-11 DIAGNOSIS — M54.16 LUMBAR RADICULOPATHY: ICD-10-CM

## 2021-03-11 PROCEDURE — 99442 PR PHYS/QHP TELEPHONE EVALUATION 11-20 MIN: CPT | Performed by: PHYSICIAN ASSISTANT

## 2021-03-11 RX ORDER — HYDROCODONE BITARTRATE AND ACETAMINOPHEN 5; 325 MG/1; MG/1
1 TABLET ORAL 2 TIMES DAILY PRN
Qty: 60 TABLET | Refills: 0 | Status: SHIPPED
Start: 2021-03-13 | End: 2021-04-08 | Stop reason: SDUPTHER

## 2021-03-11 NOTE — PROGRESS NOTES
Millie Leon was read the following message We want to confirm that, for purposes of billing, this is a virtual visit with your provider for which we will submit a claim for reimbursement with your insurance company. You will be responsible for any copays, coinsurance amounts or other amounts not covered by your insurance company. If you do not accept this, unfortunately we will not be able to schedule or proceed with a virtual visit with the provider. Do you accept? Rosio responded Yes .

## 2021-03-11 NOTE — PROGRESS NOTES
PATTIE RUGGIERO [facility] - [facility] Pain Management  [facility]    Telephone follow up visit      Date of Visit:  3/11/2021    CC: Follow Up    Consent:  Telephone follow up due to Robertcintia 19 pandemic   The patient and/or health care decision maker is aware that he/she may receive a bill for this telephone service, depending on his insurance coverage, and has provided verbal consent to proceed: Yes    I have considered the risks of abuse, dependence, addiction and diversion. My patient is aware that they will need a follow-up visit (in-person or virtually) at the appropriate time indicated for continued medications. Further, my patient is aware that when this acute crisis has lifted, they will be expected to return for an in-person visit and all elements of standard local and hospital guidelines in order to continue this medication. Patient location: Home   Provider Location:Office    HPI:  Pain is worse to right foot. States that she does have increased pain to her right lower leg but states that it comes and goes. States that she has turned up her stimulator but states the vibration is bothersome. Appropriate analgesia with current medications regimen: yes. Change in quality of symptoms:no. Medication side effects:none. Recent diagnostic testing:none. Recent interventional procedures:none. She has been on anticoagulation medications to include Plavix. The patient  has not been on herbal supplements. The patient is not diabetic.     Imaging:     Lumbar F/E films 12/2018 -   Posterior pedicle screw plates extend from L32 vertebral level to the   sacrum. Bony demineralization. Previous discectomy with loss of disc   space L1-2. Retrolisthesis L1-2 and anterolisthesis L4-5 S1. Multilevel disc space narrowing. No acute osseous finding. No   instability on flexion-extension views.      Lumbar MRI 12/2018 -   Posterior pedicle screw plates extend from F95 vertebral level to the   sacrum.  Bony demineralization. Previous discectomy with loss of disc   space L1-2. Retrolisthesis L1-2 and anterolisthesis L4-5 S1. Multilevel disc space narrowing. No acute osseous finding. No   instability on flexion-extension views.      Lumbar MRI 2016 -                   Potential Aberrant Drug-Related Behavior: no      Urine Drug Screenin2019 + hydrocodone and tramadol  2019 consistent  10/2019 consistent for hydrocodone and metabolite  2020 Consistent  2021 Consistent     OARRS report:  2019 consistent to 2019 consistent (post op medication s/p surgery)  2020 consistent to 2021 consistent     Opioid Agreement:  10/08/2020     Past Medical History: Reviewed    Past Surgical History: Reviewed     Home Medications: Reviewed    Allergies: Reviewed     Social History: Reviewed     REVIEW OF SYSTEMS:     [facility] denies fever/chills, chest pain, shortness of breath, new bowel or bladder complaints. All other review of systems was negative.     PHYSICAL EXAMINATION:     General:       A & O x3    Lungs:    Breathing:  No breathing abnormalities noted over the phone      Impression:    LBP and RLE pain in L5 distribution with diffuse weakness of the LLE  Multiple prior lumbar fusion surgeries with Dr. Tayler Velazquez, most recent 2016 extending her fusion from T10-sacrum  On PLAVIX and ASA for Hx TIA's/CVA's  Has failed multiple surgeries, injections, PT, and medications  Had staged trial with Dr. Prieto Dies 2019 and 2019, has had some improvement in her chronic pain thus far but does need a reprogramming, prior reprogramming has been helpful until she seems to lose the stimulation in the needed areas.                  Patient is s/p:  OPERATIONS PERFORMED:               1.  Exploration of left gluteal pocket for spinal cord stimulator                implantable pulse generator.                2.  Revision of the pocket.    3.  Replacement of implantable pulse generator and new Pocket     on  01/09/2020.      OARRS reviewed 03/2021  UDS reviewed and is consistent  Continue Norco 5/325 BID #60, plan to wean off if able but for now not able to wean any further.    Continue gabapentin as currently prescribed from outside provider, TID  Continue off tizanidine if able, can take prn if necessary.  Has refills.    Compounding pain cream - ineffective  Aquatic PT - on hold at this time. Patient encouraged to stay active  Treatment plan discussed with the patient including medication side    effects. Controlled Substance Monitoring:    Acute and Chronic Pain Monitoring:   RX Monitoring 3/11/2021   Attestation -   Periodic Controlled Substance Monitoring Possible medication side effects, risk of tolerance/dependence & alternative treatments discussed. ;No signs of potential drug abuse or diversion identified. ;Assessed functional status. ;Obtaining appropriate analgesic effect of treatment. We discussed with the patient that combining opioids, benzodiazepines, alcohol, illicit drugs or sleep aids increases the risk of respiratory depression including death. We discussed that these medications may cause drowsiness, sedation or dizziness and have counseled the patient not to drive or operate machinery. We have discussed that these medications will be prescribed only by one provider. We have discussed with the patient about age related risk factors and have thoroughly discussed the importance of taking these medications as prescribed. The patient verbalizes understanding. Patient advised regarding steps to help prevent the spread of COVID-19   SOURCE - https://mery-alvin.info/. html     1-Stay home except to get medical care  2-Clean your hands often for atleast 20 secnds, avoid touching: Avoid touching your eyes, nose, and mouth with unwashed hands. 3-Seek medical attention: Seek prompt medical attention if your illness is worsening (e.g., difficulty breathing). Call you doctor first.  3-Wear a facemask if you are sick   4-Cover your coughs and sneezes        I affirm this is a Patient Initiated Episode with an Established Patient who has not had a related appointment within my department in the past 7 days or scheduled within the next 24 hours.     Total Time: minutes: 11-20 minutes    Note: not billable if this call serves to triage the patient into an appointment for the relevant concern

## 2021-03-22 ENCOUNTER — TELEPHONE (OUTPATIENT)
Dept: PAIN MANAGEMENT | Age: 81
End: 2021-03-22

## 2021-03-22 NOTE — TELEPHONE ENCOUNTER
Called patient. Left message. Plan will be to discuss having [facility] rep meet her at her next appointment. If no relief after they adjust her stimulator, can plan on doing a caudal EZIO if she would like.

## 2021-04-08 ENCOUNTER — OFFICE VISIT (OUTPATIENT)
Dept: PAIN MANAGEMENT | Age: 81
End: 2021-04-08
Payer: MEDICARE

## 2021-04-08 VITALS
WEIGHT: 143 LBS | RESPIRATION RATE: 16 BRPM | BODY MASS INDEX: 25.34 KG/M2 | SYSTOLIC BLOOD PRESSURE: 104 MMHG | HEART RATE: 68 BPM | OXYGEN SATURATION: 98 % | TEMPERATURE: 97.7 F | DIASTOLIC BLOOD PRESSURE: 68 MMHG | HEIGHT: 63 IN

## 2021-04-08 DIAGNOSIS — G89.4 CHRONIC PAIN SYNDROME: ICD-10-CM

## 2021-04-08 DIAGNOSIS — Z91.81 AT HIGH RISK FOR FALLS: ICD-10-CM

## 2021-04-08 DIAGNOSIS — M54.41 CHRONIC BILATERAL LOW BACK PAIN WITH RIGHT-SIDED SCIATICA: Primary | ICD-10-CM

## 2021-04-08 DIAGNOSIS — M96.1 LUMBAR POST-LAMINECTOMY SYNDROME: ICD-10-CM

## 2021-04-08 DIAGNOSIS — M54.16 LUMBAR RADICULOPATHY: ICD-10-CM

## 2021-04-08 DIAGNOSIS — G89.29 CHRONIC BILATERAL LOW BACK PAIN WITH RIGHT-SIDED SCIATICA: Primary | ICD-10-CM

## 2021-04-08 DIAGNOSIS — F45.1 SOMATIC SYMPTOM DISORDER, PERSISTENT, SEVERE, WITH PREDOMINANT PAIN: ICD-10-CM

## 2021-04-08 PROCEDURE — 99213 OFFICE O/P EST LOW 20 MIN: CPT | Performed by: PHYSICIAN ASSISTANT

## 2021-04-08 RX ORDER — HYDROCODONE BITARTRATE AND ACETAMINOPHEN 5; 325 MG/1; MG/1
1 TABLET ORAL 2 TIMES DAILY PRN
Qty: 60 TABLET | Refills: 0 | Status: SHIPPED
Start: 2021-04-12 | End: 2021-05-06 | Stop reason: SDUPTHER

## 2021-04-08 NOTE — PROGRESS NOTES
[address] [facility]  [address]    Follow up Note      Rehana Conner     Date of Visit:  2021    CC:  Patient presents for follow up   Chief Complaint   Patient presents with    Follow-up     tail bone down right leg       HPI:    Pain is slightly better after SCS rep adjusted her stimulator. Appropriate analgesia with current medications regimen: Yes. Change in quality of symptoms: no   Medication side effects:none. Recent diagnostic testing:none. Recent interventional procedures:none. She has been on anticoagulation medications to include Plavix. The patient  has not been on herbal supplements. The patient is not diabetic. Imaging:     Lumbar F/E films 2018 -   Posterior pedicle screw plates extend from L87 vertebral level to the   sacrum. Bony demineralization. Previous discectomy with loss of disc   space L1-2. Retrolisthesis L1-2 and anterolisthesis L4-5 S1. Multilevel disc space narrowing. No acute osseous finding. No   instability on flexion-extension views.      Lumbar MRI 2018 -   Posterior pedicle screw plates extend from X31 vertebral level to the   sacrum. Bony demineralization. Previous discectomy with loss of disc   space L1-2. Retrolisthesis L1-2 and anterolisthesis L4-5 S1. Multilevel disc space narrowing. No acute osseous finding.  No   instability on flexion-extension views.      Lumbar MRI 2016 -                   Potential Aberrant Drug-Related Behavior: no      Urine Drug Screenin2019 + hydrocodone and tramadol  2019 consistent  10/2019 consistent for hydrocodone and metabolite  2020 Consistent  2021 Consistent     OARRS report:  2019 consistent to 2019 consistent (post op medication s/p surgery)  2020 consistent to 2021 consistent     Opioid Agreement:  10/08/2020    Past Medical History:   Diagnosis Date    Anxiety     CVA [facility]cerebral vascular accident) ([facility] [address]     L FACIAL SENSATION    Depression     Diverticulitis     Hyperlipidemia     Hypertension     OA (osteoarthritis) of hip     OP (osteoporosis)     Shingles     R ABD    TIA (transient ischemic attack)     Tinnitus     Wears partial dentures     upper       Past Surgical History:   Procedure Laterality Date    BACK SURGERY      x3    CHOLECYSTECTOMY      COLONOSCOPY N/A 8/28/2019    COLONOSCOPY WITH BIOPSY performed by Tenisha Persaud MD at [address] COLONOSCOPY  8/28/2019    COLONOSCOPY POLYPECTOMY SNARE/COLD BIOPSY performed by Tenisha Persaud MD at [address] [facility] 63      kuldip lense implant    HERNIA REPAIR  2018    HYSTERECTOMY      LUMBAR LAMINECTOMY      SPINAL CORD STIMULATOR IMPLANTATION N/A 5/6/2019    T8 SPINAL CORD STIMULATOR  TRIAL PLACEMENT--SUNITHA, KODAK TABLE, BOSTON SCIENTIFIC performed by Felicia Santiago MD at [address] [place] [address] N/A 5/14/2019    PLACEMENT OF  PERMANENT SPINAL CORD STIMULATOR---XRAY, KODAK TABLE, BOSTON [employer]ENTIFIC performed by Felicia Santiago MD at [address] [place] [address] N/A 1/9/2020    REVISION OF SPINAL CORD STIMULATOR  BATTERY AND POCKET performed by Felicia Santiago MD at [facility] N/A 8/28/2019    EGD BIOPSY performed by Tenisha Persaud MD at [address] [facility]       Prior to Admission medications    Medication Sig Start Date End Date Taking? Authorizing Provider   HYDROcodone-acetaminophen (NORCO) 5-325 MG per tablet Take 1 tablet by mouth 2 times daily as needed for Pain for up to 30 days. Intended supply: 30 days 4/12/21 5/12/21 Yes SEBASTIÁN Goldberg   citalopram (CELEXA) 10 MG tablet Take 1 tablet by mouth daily 2/23/21  Yes Uriel Colunga DO   memantine (NAMENDA) 5 MG tablet Take 1 tablet by mouth 2 times daily For memory 2/4/21  Yes Uriel Colunga DO   gabapentin (NEURONTIN) 300 MG capsule Take 1 capsule by mouth 5 times daily for 360 days.  1/13/21 1/8/22 Yes Francis Ward DO   simvastatin (ZOCOR) 10 MG tablet Take 1 tablet by mouth daily 12/10/20  Yes Uriel Colunga DO   carvedilol (COREG) 12.5 MG tablet Take 1 tablet by mouth 2 times daily (with meals) 12/10/20  Yes Uriel Colunga DO   clopidogrel (PLAVIX) 75 MG tablet Take 1 tablet by mouth daily 9/8/20  Yes Uriel Colunga DO   tiZANidine (ZANAFLEX) 4 MG tablet Take 1 tablet by mouth 3 times daily 6/18/20  Yes Uriel Colunga DO   losartan (COZAAR) 50 MG tablet Take 1 tablet by mouth daily 2/12/20  Yes Uriel Colunga DO   hydrochlorothiazide (HYDRODIURIL) 25 MG tablet Take 1 tablet by mouth daily 10/28/19  Yes Uriel Colunga DO   dicyclomine (BENTYL) 10 MG capsule Take 1 capsule by mouth 4 times daily 8/13/19  Yes Alicia Bach MD   pantoprazole (PROTONIX) 40 MG tablet TAKE 1 TABLET BY MOUTH ONCE DAILY ( TAKE B-12 1000MG AND D3 2000MG FOR GERD) 5/19/19  Yes Historical Provider, MD   vitamin B-12 (CYANOCOBALAMIN) 1000 MCG tablet Take 1,000 mcg by mouth daily   Yes Historical Provider, MD   vitamin D (D3-1000) 1000 units CAPS Take by mouth daily   Yes Historical Provider, MD       Allergies   Allergen Reactions    Pcn [Penicillins] Rash       Social History     Socioeconomic History    Marital status: Single     Spouse name: Not on file    Number of children: Not on file    Years of education: Not on file    Highest education level: Not on file   Occupational History    Not on file   Social Needs    Financial resource strain: Not on file    Food insecurity     Worry: Not on file     Inability: Not on file    Transportation needs     Medical: Not on file     Non-medical: Not on file   Tobacco Use    Smoking status: Never Smoker    Smokeless tobacco: Never Used   Substance and Sexual Activity    Alcohol use: Yes     Comment: rare    Drug use: No    Sexual activity: Not Currently   Lifestyle    Physical activity     Days per week: Not on file     Minutes per session: Not on file    Stress: Not on file Relationships    Social connections     Talks on phone: Not on file     Gets together: Not on file     Attends [employer] service: Not on file     Active member of club or organization: Not on file     Attends meetings of clubs or organizations: Not on file     Relationship status: Not on file    Intimate partner violence     Fear of current or ex partner: Not on file     Emotionally abused: Not on file     Physically abused: Not on file     Forced sexual activity: Not on file   Other Topics Concern    Not on file   Social History Narrative        CATARACTS    TINNITUS    GB OUT     C [address] [facility] [place]  [facility] CVA---F  52 GF  FROM CVA AT 46    FH HTN BRO    CATH NEG     OP    TICS    BOYFRIEND [address] [facility]  1940 [address]    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 BRADLEY---CAVALEIR X RAY    CH--5   Girl struthers   Two boys kinsman    Two out of town    [address] [facility]    CVA AFFECT L FACIAL SENSATION  [facility] Dr RIVAS-----    COLON -10----NEG BUT PREVIOUS TICS AND POLYP    TIA -11    SHINGLES R ABD -12    MRI  WITH L-2-3 DISC AND NERVE----DR RIVAS----OR SET UP    ADMIT  WITH TIA----DR Francesca Vasquez DC ASA AND ADDED PLAVIX TO THE PERSANTINE    LUMBAR CHERYL OR DR RIVAS 10-12------------AGAIN DONE SEAMUS RIVAS     MOTHER  SUICIDE AGE 43    DAD  AT 52 CVA    FIRST HUS SUICIDE AT 46    X RAY 11=13 WITH NEW L-1 -2 NARROWING AND RETROLISTHESIS----SHOTS WITH DR Neris Bach    ANX--DEP 1-14    OA HIPS DR CHARLTON---    DIET DM 4=16    LUMBAR DISC SURGERY 16 DR RIVAS    LEFT INGUINAL HERNIA WITH MESH DR BUNCH     ATHEROSCLEROIS EVAL DR BROTHERS     EVAL DR ALFONSO NEWMAN--- ADIVSED ENT---PT SAYS HE TOLD HER NOT GET NEBULZIER---AND    REFUSESD FOLLOW UP    T-8 SPINAL CORD STIMULATOR TRIAL 19--------implanted permanent dr Hortencia Vu  ---device moved on buttock   1-20    PAIN MEDS WITH DR Daniela Mohr MEKA ABARCA [facility]    Son with  Lung ca and brain mets  2-20----- [address]    luz maria Abad for dementia    Pain management----------------pain doc monthly       Family History   Problem Relation Age of Onset    Stroke Father        REVIEW OF SYSTEMS:     Melisa Sher denies fever/chills, chest pain, shortness of breath, new bowel or bladder complaints. All other review of systems was negative. PHYSICAL EXAMINATION:      /68   Pulse 68   Temp 97.7 °F (36.5 °C) (Infrared)   Resp 16   Ht 5' 3\" (1.6 m)   Wt 143 lb (64.9 kg)   SpO2 98%   BMI 25.33 kg/m²     General:      General appearance:   pleasant and well-hydrated. , in no discomfort and A & O x3  Build:Normal Weight  Function:Rises from a seated position with difficulty    HEENT:    Head:normocephalic and atraumatic  Pupils:regular, round and equal.  Sclera: icterus absent,    Lungs:    Breathing:Normal expansion. No wheezing. Abdomen:    Shape:non-distended and normal    Lumbar spine:    Range of motion:abnormal mildly Lateral bending, flexion, extension rotation bilateral and is painful. Extremities:    Tremors:None bilaterally upper and lower  Range of motion:Generally normal shoulders, pain with internal rotation of hips not done.   Intact:Yes  Varicose veins:absent   Cyanosis:none  Edema:Normal    Neurological:    Motor:   [address]    Dermatology:    Skin:no unusual rashes, no skin lesions, no palpable subcutaneous nodules and good skin turgor    Impression:    LBP and RLE pain in L5 distribution with diffuse weakness of the LLE  Multiple prior lumbar fusion surgeries with Dr. Isela Russell, most recent 9/2016 extending her fusion from T10-sacrum  On PLAVIX and ASA for Hx TIA's/CVA's  Has failed multiple surgeries, injections, PT, and medications  Had staged trial with Dr. Paco Rajan 5/6/2019 and 5/14/2019, has had some improvement in her chronic pain thus far but does need a reprogramming, prior reprogramming has been helpful until she seems to lose the stimulation in the needed areas.                  Patient is s/p:  OPERATIONS PERFORMED:               1.  Exploration of left gluteal pocket for spinal cord stimulator                implantable pulse generator.                2.  Revision of the pocket.    3.  Replacement of implantable pulse generator and new Pocket     on  01/09/2020.      SCS rep here today to adjust her stimulator. States that she feels slightly better.     OARRS reviewed 04/2021  Continue Norco 5/325 BID #60, plan to wean off if able but for now not able to wean any further.    Continue gabapentin as currently prescribed from outside provider, TID  Continue off tizanidine if able, can take prn if necessary.  Has refills.    Compounding pain cream - ineffective  Aquatic PT - on hold at this time. Patient encouraged to stay active  Treatment plan discussed with the patient including medication side    effects. Controlled Substance Monitoring:    Acute and Chronic Pain Monitoring:   RX Monitoring 4/8/2021   Attestation -   Periodic Controlled Substance Monitoring Possible medication side effects, risk of tolerance/dependence & alternative treatments discussed. ;No signs of potential drug abuse or diversion identified. ;Assessed functional status. ;Obtaining appropriate analgesic effect of treatment.                        We discussed with the patient that combining opioids, benzodiazepines, alcohol, illicit drugs or sleep aids increases the risk of respiratory depression including death. We discussed that these medications may cause drowsiness, sedation or dizziness and have counseled the patient not to drive or operate machinery. We have discussed that these medications will be prescribed only by one provider. We have discussed with the patient about age related risk factors and have thoroughly discussed the importance of taking these medications as prescribed. The patient verbalizes understanding.

## 2021-04-08 NOTE — PROGRESS NOTES
Do you currently have any of the following:    Fever: No  Headache:  No  Cough: No  Shortness of breath: No  Exposed to anyone with these symptoms: No                                                                                                                Kaitlyn Leonie presents to the [facility] on 4/8/2021. An Young is complaining of pain tail bone down right leg. The pain is constant. The pain is described as aching. Pain is rated on her best day at a 5, on her worst day at a 8, and on average at a 5 on the VAS scale. She took her last dose of Norco, Neurontin and zanaflex today. An Young does not have issues with constipation. Any procedures since your last visit: No, with  % relief. She is not on NSAIDS and  is  on anticoagulation medications to include Plavix and is managed by Tamar De Luna DO  . Pacemaker or defibrillator: No Physician managing device is . Medication Contract and Consent for Opioid Use Documents Filed     Patient Documents       Type of Document Status Date Received Received By Description     Medication Contract Received 12/18/2018  8:06 AM DAFNE MALONE PAIN MANAGEMENT PT AGREEMENT 12/18/2018     Medication Contract Received 5/4/2019 10:42 AM Ritu Downing Neurosurgery medication contract     Medication Contract Received 10/8/2020  2:56 PM Josephine KANG 2020/010/08 paina greement                    /68   Pulse 68   Temp 97.7 °F (36.5 °C) (Infrared)   Resp 16   Ht 5' 3\" (1.6 m)   Wt 143 lb (64.9 kg)   SpO2 98%   BMI 25.33 kg/m²      No LMP recorded. Patient has had a hysterectomy.

## 2021-04-24 ENCOUNTER — APPOINTMENT (OUTPATIENT)
Dept: CT IMAGING | Age: 81
End: 2021-04-24
Payer: MEDICARE

## 2021-04-24 ENCOUNTER — HOSPITAL ENCOUNTER (EMERGENCY)
Age: 81
Discharge: HOME | End: 2021-04-24
Attending: EMERGENCY MEDICINE
Payer: MEDICARE

## 2021-04-24 VITALS
OXYGEN SATURATION: 97 % | WEIGHT: 140 LBS | DIASTOLIC BLOOD PRESSURE: 90 MMHG | TEMPERATURE: 97.9 F | RESPIRATION RATE: 16 BRPM | HEIGHT: 63 IN | BODY MASS INDEX: 24.8 KG/M2 | HEART RATE: 68 BPM | SYSTOLIC BLOOD PRESSURE: 180 MMHG

## 2021-04-24 DIAGNOSIS — G45.9 TIA (TRANSIENT ISCHEMIC ATTACK): Primary | ICD-10-CM

## 2021-04-24 LAB
ALBUMIN SERPL-MCNC: 4.4 G/DL (ref 3.5–5.2)
ALP BLD-CCNC: 82 U/L (ref 35–104)
ALT SERPL-CCNC: 13 U/L (ref 0–32)
ANION GAP SERPL CALCULATED.3IONS-SCNC: 9 MMOL/L (ref 7–16)
AST SERPL-CCNC: 16 U/L (ref 0–31)
BASOPHILS ABSOLUTE: 0.04 E9/L (ref 0–0.2)
BASOPHILS RELATIVE PERCENT: 0.5 % (ref 0–2)
BILIRUB SERPL-MCNC: 0.5 MG/DL (ref 0–1.2)
BUN BLDV-MCNC: 14 MG/DL (ref 6–23)
CALCIUM SERPL-MCNC: 9.4 MG/DL (ref 8.6–10.2)
CHLORIDE BLD-SCNC: 101 MMOL/L (ref 98–107)
CO2: 27 MMOL/L (ref 22–29)
CREAT SERPL-MCNC: 0.7 MG/DL (ref 0.5–1)
EOSINOPHILS ABSOLUTE: 0.17 E9/L (ref 0.05–0.5)
EOSINOPHILS RELATIVE PERCENT: 2.2 % (ref 0–6)
GFR AFRICAN AMERICAN: >60
GFR NON-AFRICAN AMERICAN: >60 ML/MIN/1.73
GLUCOSE BLD-MCNC: 124 MG/DL (ref 74–99)
HCT VFR BLD CALC: 40.9 % (ref 34–48)
HEMOGLOBIN: 13.3 G/DL (ref 11.5–15.5)
IMMATURE GRANULOCYTES #: 0.03 E9/L
IMMATURE GRANULOCYTES %: 0.4 % (ref 0–5)
LYMPHOCYTES ABSOLUTE: 2.65 E9/L (ref 1.5–4)
LYMPHOCYTES RELATIVE PERCENT: 33.5 % (ref 20–42)
MCH RBC QN AUTO: 31.6 PG (ref 26–35)
MCHC RBC AUTO-ENTMCNC: 32.5 % (ref 32–34.5)
MCV RBC AUTO: 97.1 FL (ref 80–99.9)
MONOCYTES ABSOLUTE: 0.62 E9/L (ref 0.1–0.95)
MONOCYTES RELATIVE PERCENT: 7.8 % (ref 2–12)
NEUTROPHILS ABSOLUTE: 4.39 E9/L (ref 1.8–7.3)
NEUTROPHILS RELATIVE PERCENT: 55.6 % (ref 43–80)
PDW BLD-RTO: 11.6 FL (ref 11.5–15)
PLATELET # BLD: 274 E9/L (ref 130–450)
PMV BLD AUTO: 9.9 FL (ref 7–12)
POTASSIUM SERPL-SCNC: 3.9 MMOL/L (ref 3.5–5)
RBC # BLD: 4.21 E12/L (ref 3.5–5.5)
SODIUM BLD-SCNC: 137 MMOL/L (ref 132–146)
TOTAL PROTEIN: 7.2 G/DL (ref 6.4–8.3)
TROPONIN: <0.01 NG/ML (ref 0–0.03)
WBC # BLD: 7.9 E9/L (ref 4.5–11.5)

## 2021-04-24 PROCEDURE — 99284 EMERGENCY DEPT VISIT MOD MDM: CPT

## 2021-04-24 PROCEDURE — 80053 COMPREHEN METABOLIC PANEL: CPT

## 2021-04-24 PROCEDURE — 70450 CT HEAD/BRAIN W/O DYE: CPT

## 2021-04-24 PROCEDURE — 96375 TX/PRO/DX INJ NEW DRUG ADDON: CPT

## 2021-04-24 PROCEDURE — 6360000002 HC RX W HCPCS: Performed by: EMERGENCY MEDICINE

## 2021-04-24 PROCEDURE — 6370000000 HC RX 637 (ALT 250 FOR IP): Performed by: EMERGENCY MEDICINE

## 2021-04-24 PROCEDURE — 96374 THER/PROPH/DIAG INJ IV PUSH: CPT

## 2021-04-24 PROCEDURE — 36415 COLL VENOUS BLD VENIPUNCTURE: CPT

## 2021-04-24 PROCEDURE — 85025 COMPLETE CBC W/AUTO DIFF WBC: CPT

## 2021-04-24 PROCEDURE — 84484 ASSAY OF TROPONIN QUANT: CPT

## 2021-04-24 PROCEDURE — 93005 ELECTROCARDIOGRAM TRACING: CPT | Performed by: EMERGENCY MEDICINE

## 2021-04-24 RX ORDER — DIPHENHYDRAMINE HYDROCHLORIDE 50 MG/ML
25 INJECTION INTRAMUSCULAR; INTRAVENOUS ONCE
Status: COMPLETED | OUTPATIENT
Start: 2021-04-24 | End: 2021-04-24

## 2021-04-24 RX ORDER — METOCLOPRAMIDE HYDROCHLORIDE 5 MG/ML
10 INJECTION INTRAMUSCULAR; INTRAVENOUS ONCE
Status: COMPLETED | OUTPATIENT
Start: 2021-04-24 | End: 2021-04-24

## 2021-04-24 RX ORDER — ACETAMINOPHEN 500 MG
1000 TABLET ORAL ONCE
Status: COMPLETED | OUTPATIENT
Start: 2021-04-24 | End: 2021-04-24

## 2021-04-24 RX ORDER — MORPHINE SULFATE 2 MG/ML
2 INJECTION, SOLUTION INTRAMUSCULAR; INTRAVENOUS ONCE
Status: COMPLETED | OUTPATIENT
Start: 2021-04-24 | End: 2021-04-24

## 2021-04-24 RX ADMIN — METOCLOPRAMIDE HYDROCHLORIDE 10 MG: 5 INJECTION INTRAMUSCULAR; INTRAVENOUS at 15:44

## 2021-04-24 RX ADMIN — ACETAMINOPHEN 1000 MG: 500 TABLET ORAL at 15:44

## 2021-04-24 RX ADMIN — MORPHINE SULFATE 2 MG: 2 INJECTION, SOLUTION INTRAMUSCULAR; INTRAVENOUS at 16:14

## 2021-04-24 RX ADMIN — DIPHENHYDRAMINE HYDROCHLORIDE 25 MG: 50 INJECTION, SOLUTION INTRAMUSCULAR; INTRAVENOUS at 15:44

## 2021-04-24 ASSESSMENT — PAIN DESCRIPTION - PAIN TYPE: TYPE: ACUTE PAIN

## 2021-04-24 ASSESSMENT — PAIN SCALES - GENERAL
PAINLEVEL_OUTOF10: 8
PAINLEVEL_OUTOF10: 7
PAINLEVEL_OUTOF10: 8

## 2021-04-24 ASSESSMENT — PAIN DESCRIPTION - LOCATION: LOCATION: HEAD

## 2021-04-24 NOTE — ED NOTES
Bed: 11  Expected date:   Expected time:   Means of arrival:   Comments:  Yamilet Ashley RN  04/24/21 1261

## 2021-04-24 NOTE — ED PROVIDER NOTES
HPI:  4/24/21,   Time: 1:45 PM EDT         Mary Chavis is a [de-identified] y.o. female presenting to the ED for of left facial numbness left hand numbness left facial droop drooling and a sudden onset of a headache, beginning an hour and a half ago. The complaint has been intermittent, moderate in severity, and worsened by nothing. Patient states that she is not having any chest pain or abdominal pain she just has a right temporal headache and there has been no visual change. Patient is able to speak normally at this time. ROS:   Pertinent positives and negatives are stated within HPI, all other systems reviewed and are negative.  --------------------------------------------- PAST HISTORY ---------------------------------------------  Past Medical History:  has a past medical history of Anxiety, CVA (cerebral vascular accident) ([facility] [address] Depression, Diverticulitis, Hyperlipidemia, Hypertension, OA (osteoarthritis) of hip, OP (osteoporosis), Shingles, TIA (transient ischemic attack), Tinnitus, and Wears partial dentures. Past Surgical History:  has a past surgical history that includes back surgery; Cholecystectomy; Hysterectomy; hernia repair (2018); spinal cord stimulator implantation (N/A, 5/6/2019); spinal cord stimulator implantation (N/A, 5/14/2019); Colonoscopy (N/A, 8/28/2019); Upper gastrointestinal endoscopy (N/A, 8/28/2019); Colonoscopy (8/28/2019); lumbar laminectomy; eye surgery; and spinal cord stimulator implantation (N/A, 1/9/2020). Social History:  reports that she has never smoked. She has never used smokeless tobacco. She reports current alcohol use. She reports that she does not use drugs. Family History: family history includes Stroke in her father. The patients home medications have been reviewed.     Allergies: Pcn [penicillins]    -------------------------------------------------- RESULTS -------------------------------------------------  All laboratory and radiology results have been intracranial abnormality.             ------------------------- NURSING NOTES AND VITALS REVIEWED ---------------------------   The nursing notes within the ED encounter and vital signs as below have been reviewed. BP (!) 203/122   Pulse 79   Temp 97.9 °F (36.6 °C) (Temporal)   Resp 18   Ht 5' 3\" (1.6 m)   Wt 140 lb (63.5 kg)   SpO2 99%   BMI 24.80 kg/m²   Oxygen Saturation Interpretation: Normal      ---------------------------------------------------PHYSICAL EXAM--------------------------------------      Constitutional/General: Alert and oriented x3, well appearing, non toxic in NAD  Head: NC/AT  Eyes: PERRL, EOMI  Mouth: Oropharynx clear, handling secretions, no trismus  Neck: Supple, full ROM, no meningeal signs  Pulmonary: Lungs clear to auscultation bilaterally, no wheezes, rales, or rhonchi. Not in respiratory distress  Cardiovascular:  Regular rate and rhythm, no murmurs, gallops, or rubs. 2+ distal pulses  Abdomen: Soft, non tender, non distended,   Extremities: Moves all extremities x 4. Warm and well perfused  Skin: warm and dry without rash  Neurologic: GCS 15, cranial nerves II through XII intact 5+ strength normal gait no pronator drift no Romberg sign  Psych: Normal Affect      ------------------------------ ED COURSE/MEDICAL DECISION MAKING----------------------  Medications   acetaminophen (TYLENOL) tablet 1,000 mg (1,000 mg Oral Given 4/24/21 1544)   metoclopramide (REGLAN) injection 10 mg (10 mg Intravenous Given 4/24/21 1544)   diphenhydrAMINE (BENADRYL) injection 25 mg (25 mg Intravenous Given 4/24/21 1544)   morphine (PF) injection 2 mg (2 mg Intravenous Given 4/24/21 1614)         Medical Decision Making:    Is already on Plavix she has been worked up extensively in the past for this her TIA lasted 10 minutes she did have a headache following the TIA she complains that it is over her temple and denies any other complaints.   Her significant other and the patient both want her to be discharged. She was medicated until her headache was controlled and she was discharged in stable condition    Counseling: The emergency provider has spoken with the patient and discussed todays results, in addition to providing specific details for the plan of care and counseling regarding the diagnosis and prognosis. Questions are answered at this time and they are agreeable with the plan.      --------------------------------- IMPRESSION AND DISPOSITION ---------------------------------    IMPRESSION  1.  TIA (transient ischemic attack)        DISPOSITION  Disposition: Discharge to home  Patient condition is stable                  Gloria Jackson MD  04/24/21 [id number]

## 2021-04-25 LAB
EKG ATRIAL RATE: 80 BPM
EKG P AXIS: 73 DEGREES
EKG P-R INTERVAL: 160 MS
EKG Q-T INTERVAL: 384 MS
EKG QRS DURATION: 82 MS
EKG QTC CALCULATION (BAZETT): 442 MS
EKG R AXIS: 77 DEGREES
EKG T AXIS: 56 DEGREES
EKG VENTRICULAR RATE: 80 BPM

## 2021-04-25 PROCEDURE — 93010 ELECTROCARDIOGRAM REPORT: CPT | Performed by: INTERNAL MEDICINE

## 2021-04-26 ENCOUNTER — OFFICE VISIT (OUTPATIENT)
Dept: PRIMARY CARE CLINIC | Age: 81
End: 2021-04-26
Payer: MEDICARE

## 2021-04-26 VITALS
BODY MASS INDEX: 25.86 KG/M2 | TEMPERATURE: 98.2 F | WEIGHT: 146 LBS | SYSTOLIC BLOOD PRESSURE: 163 MMHG | DIASTOLIC BLOOD PRESSURE: 82 MMHG

## 2021-04-26 DIAGNOSIS — R41.3 MEMORY IMPAIRMENT: Chronic | ICD-10-CM

## 2021-04-26 DIAGNOSIS — G45.9 TIA (TRANSIENT ISCHEMIC ATTACK): ICD-10-CM

## 2021-04-26 DIAGNOSIS — I10 ESSENTIAL HYPERTENSION: Primary | ICD-10-CM

## 2021-04-26 DIAGNOSIS — M96.1 LUMBAR POST-LAMINECTOMY SYNDROME: Chronic | ICD-10-CM

## 2021-04-26 PROCEDURE — 99214 OFFICE O/P EST MOD 30 MIN: CPT | Performed by: FAMILY MEDICINE

## 2021-04-26 NOTE — PROGRESS NOTES
21  Name: Lola Sarmiento    : 1940    Sex: female    Age: [de-identified] y.o. Subjective:  Chief Complaint: Patient is here for er  follow-up and TIA  BP   Right leg numbness    Left faial droop    lwo back pain  memory    Patient went to the emergency room with left facial droop and discharged with diagnosis of TIA  Two days ago   Also slurring her words  Was sigifredo by ttime  Home       Fine now  bp  High when in er  She feels short term memory much worse  Last     Year but  More so    Last month o two   Saw  Dr Cesar Reese  August and mri done and pt  mitchell    Follow and reufses to do so   boyfirensd in room  Also right leg numbness and tingling for the last year or so since unchanged. Felt to be related back issues in the past.  She does do well with her back stimulator and charges it every week. Has not seen a surgeon since with no follow-up set      Review of Systems   Constitutional: Negative. HENT: Negative. Eyes: Negative. Respiratory: Negative. Cardiovascular: Negative. Gastrointestinal: Negative. Endocrine: Negative. Genitourinary: Negative. Musculoskeletal: Negative. Skin: Negative. Allergic/Immunologic: Negative. Neurological: Negative. See   hpi   Hematological: Negative. Psychiatric/Behavioral: Negative. Current Outpatient Medications:     losartan (COZAAR) 50 MG tablet, Take 1 tablet by mouth 2 times daily, Disp: 180 tablet, Rfl: 5    memantine (NAMENDA) 10 MG tablet, Take 1 tablet by mouth 2 times daily For memory, Disp: 180 tablet, Rfl: 5    HYDROcodone-acetaminophen (NORCO) 5-325 MG per tablet, Take 1 tablet by mouth 2 times daily as needed for Pain for up to 30 days. Intended supply: 30 days, Disp: 60 tablet, Rfl: 0    citalopram (CELEXA) 10 MG tablet, Take 1 tablet by mouth daily, Disp: 90 tablet, Rfl: 3    gabapentin (NEURONTIN) 300 MG capsule, Take 1 capsule by mouth 5 times daily for 360 days. , Disp: 450 capsule, Rfl: 3   simvastatin (ZOCOR) 10 MG tablet, Take 1 tablet by mouth daily, Disp: 90 tablet, Rfl: 3    carvedilol (COREG) 12.5 MG tablet, Take 1 tablet by mouth 2 times daily (with meals), Disp: 180 tablet, Rfl: 3    clopidogrel (PLAVIX) 75 MG tablet, Take 1 tablet by mouth daily, Disp: 90 tablet, Rfl: 3    tiZANidine (ZANAFLEX) 4 MG tablet, Take 1 tablet by mouth 3 times daily, Disp: 270 tablet, Rfl: 3    hydrochlorothiazide (HYDRODIURIL) 25 MG tablet, Take 1 tablet by mouth daily, Disp: 90 tablet, Rfl: 3    dicyclomine (BENTYL) 10 MG capsule, Take 1 capsule by mouth 4 times daily, Disp: 120 capsule, Rfl: 3    pantoprazole (PROTONIX) 40 MG tablet, TAKE 1 TABLET BY MOUTH ONCE DAILY ( TAKE B-12 1000MG AND D3 2000MG FOR GERD), Disp: , Rfl: 12    vitamin B-12 (CYANOCOBALAMIN) 1000 MCG tablet, Take 1,000 mcg by mouth daily, Disp: , Rfl:     vitamin D (D3-1000) 1000 units CAPS, Take by mouth daily, Disp: , Rfl:   Allergies   Allergen Reactions    Pcn [Penicillins] Rash     Social History     Socioeconomic History    Marital status: Single     Spouse name: Not on file    Number of children: Not on file    Years of education: Not on file    Highest education level: Not on file   Occupational History    Not on file   Social Needs    Financial resource strain: Not on file    Food insecurity     Worry: Not on file     Inability: Not on file    Transportation needs     Medical: Not on file     Non-medical: Not on file   Tobacco Use    Smoking status: Never Smoker    Smokeless tobacco: Never Used   Substance and Sexual Activity    Alcohol use: Yes     Comment: rare    Drug use: No    Sexual activity: Not Currently   Lifestyle    Physical activity     Days per week: Not on file     Minutes per session: Not on file    Stress: Not on file   Relationships    Social connections     Talks on phone: Not on file     Gets together: Not on file     Attends Zoroastrianism service: Not on file     Active member of club or organization: Not on file     Attends meetings of clubs or organizations: Not on file     Relationship status: Not on file    Intimate partner violence     Fear of current or ex partner: Not on file     Emotionally abused: Not on file     Physically abused: Not on file     Forced sexual activity: Not on file   Other Topics Concern    Not on file   Social History Narrative        CATARACTS    TINNITUS    GB OUT     C [address]     CVA---F  52 GF  FROM CVA AT 46    FH HTN BRO    CATH NEG     OP    TICS    BOYFRIEND [address]  1940 Page #2    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 BRADLEY---CAVALEIR X RAY    CH--5   Girl struthers   Two boys kinsman    Two out of town    [address] [employer] [facility]    CVA AFFECT L FACIAL SENSATION  Sergei RIVAS-----    COLON -10----NEG BUT PREVIOUS TICS AND POLYP    TIA     SHINGLES R ABD -    MRI  WITH L-2-3 One [facility] Aaron AND NERVE----DR RIVAS----OR SET UP    ADMIT  WITH TIA----DR Anibal Eckert DC ASA AND ADDED PLAVIX TO THE PERSANTINE    LUMBAR CHERYL OR DR RIVAS 10-12------------AGAIN DONE SEAMUS RIVAS     MOTHER  SUICIDE AGE 43    DAD  AT 52 CVA    FIRST HUS SUICIDE AT 52    X RAY 11=13 WITH NEW L-1 -2 NARROWING AND RETROLISTHESIS----SHOTS WITH DR Arvind Whitmore    ANX--DEP -14    OA HIPS DR CHARLTON---    DIET DM 4=16    LUMBAR DISC SURGERY 16 DR RIVAS    LEFT INGUINAL HERNIA WITH MESH DR BUNCH     ATHEROSCLEROIS EVAL DR BROTHERS     EVAL DR ALFONSO NEWMAN--- ADIVSED ENT---PT SAYS HE TOLD HER NOT GET NEBULZIER---AND    REFUSESD FOLLOW UP    T-8 SPINAL CORD STIMULATOR TRIAL 19--------implanted permanent dr Gustavo Bustillos  ---device moved on buttock   -20    PAIN MEDS WITH DR Clifton Perea PAIN [facility]    Son with  Lung ca and brain mets  2-20----- [facility]    luz maria Field for dementia       Pain management----------------pain doc monthly      Past Medical History: Diagnosis Date    Anxiety     CVA [facility]cere[facility] vascular accident) ([facility] [address]     L FACIAL SENSATION    Depression     Diverticulitis     Hyperlipidemia     Hypertension     OA (osteoarthritis) of hip     OP (osteoporosis)     Shingles     R ABD    TIA (transient ischemic attack)     Tinnitus     Wears partial dentures     upper     Family History   Problem Relation Age of Onset    Stroke Father       Past Surgical History:   Procedure Laterality Date    BACK SURGERY      x3    CHOLECYSTECTOMY      COLONOSCOPY N/A 8/28/2019    COLONOSCOPY WITH BIOPSY performed by Tierra Hernandez MD at [address]  8/28/2019    COLONOSCOPY POLYPECTOMY SNARE/COLD BIOPSY performed by Tierra Hernandez MD at [facility] [address]      kuldip lense implant    HERNIA REPAIR  2018    HYSTERECTOMY      LUMBAR LAMINECTOMY      SPINAL CORD STIMULATOR IMPLANTATION N/A 5/6/2019    T8 SPINAL CORD STIMULATOR  TRIAL PLACEMENT--SUNITHA, KODAK TABLE, BOSTON SCIENTIFIC performed by Emilee Lr MD at [address] [place] [address] N/A 5/14/2019    PLACEMENT OF  PERMANENT SPINAL CORD STIMULATOR---XRAY, KODAK TABLE, BOSTON SCIENTIFIC performed by Emilee Lr MD at [address] [place] [address] N/A 1/9/2020    REVISION OF SPINAL CORD STIMULATOR  BATTERY AND POCKET performed by Emilee Lr MD at [address] [facility] N/A 8/28/2019    EGD BIOPSY performed by Tierra Hernandez MD at [address]:    04/26/21 1639   BP: (!) 163/82   Temp: 98.2 °F (36.8 °C)   TempSrc: Oral   Weight: 146 lb (66.2 kg)       Objective:    Physical Exam  Vitals signs reviewed. Constitutional:       Appearance: She is well-developed. HENT:      Head: Normocephalic. Eyes:      Pupils: Pupils are equal, round, and reactive to light. Neck:      Musculoskeletal: Normal range of motion. Cardiovascular:      Rate and Rhythm: Normal rate and regular rhythm.    Pulmonary: Effort: Pulmonary effort is normal.      Breath sounds: Normal breath sounds. Abdominal:      Palpations: Abdomen is soft. Musculoskeletal:      Comments: Decreased lumbar flexion 50%     Skin:     General: Skin is warm. Neurological:      Mental Status: She is alert and oriented to person, place, and time. Psychiatric:         Behavior: Behavior normal.         Nayely Hall was seen today for follow-up from hospital.    Diagnoses and all orders for this visit:    Essential hypertension  -     losartan (COZAAR) 50 MG tablet; Take 1 tablet by mouth 2 times daily    TIA (transient ischemic attack)    Lumbar post-laminectomy syndrome    Memory impairment  -     memantine (NAMENDA) 10 MG tablet; Take 1 tablet by mouth 2 times daily For memory        Comments: Increase losartan to twice a day. Have him back on aspirin 81 daily. She has been off it somehow she fell off it on her own. Her home work-up for the right leg but she wants to hold off on that because this has been unchanged. She declines referral back to neurology. We will increase the Namenda dose. The 10 mg twice a day. Follow-up with pain management. Informed her of the pain management medication could cause some confusion. She is aware the risk    A great deal of time spent reviewing medications, diet, exercise, social issues. Also reviewing the chart before entering the room with patient and finishing charting after leaving patient's room. More than half of that time was spent face to face with the patient in counseling and coordinating care. Check blood pressure at home twice a day. Low-salt low caffeine diet. Call if systolic blood pressure is above 660 and diastolic blood pressures above 85. Only use a upper arm digital cuff. Follow Up: Return for mid july     fasting   morning appt.      Seen by:  Eagle Solomon DO

## 2021-04-27 ENCOUNTER — TELEPHONE (OUTPATIENT)
Dept: PRIMARY CARE CLINIC | Age: 81
End: 2021-04-27

## 2021-04-27 RX ORDER — MEMANTINE HYDROCHLORIDE 10 MG/1
10 TABLET ORAL 2 TIMES DAILY
Qty: 180 TABLET | Refills: 5 | Status: SHIPPED
Start: 2021-04-27 | End: 2021-11-12 | Stop reason: SDUPTHER

## 2021-04-27 RX ORDER — LOSARTAN POTASSIUM 50 MG/1
50 TABLET ORAL 2 TIMES DAILY
Qty: 180 TABLET | Refills: 5 | Status: SHIPPED
Start: 2021-04-27 | End: 2021-11-12 | Stop reason: SDUPTHER

## 2021-04-27 ASSESSMENT — ENCOUNTER SYMPTOMS
RESPIRATORY NEGATIVE: 1
EYES NEGATIVE: 1
ALLERGIC/IMMUNOLOGIC NEGATIVE: 1
GASTROINTESTINAL NEGATIVE: 1

## 2021-04-27 ASSESSMENT — PATIENT HEALTH QUESTIONNAIRE - PHQ9
2. FEELING DOWN, DEPRESSED OR HOPELESS: 0
SUM OF ALL RESPONSES TO PHQ QUESTIONS 1-9: 0

## 2021-04-27 NOTE — TELEPHONE ENCOUNTER
[facility] and inform [facility] that after reviewing her chart I want her to increase the losartan blood pressure medication to twice a day and I sent a new prescription to the pharmacy regarding that.

## 2021-04-28 NOTE — PROGRESS NOTES
[address]  [address] [facility], [address]    Follow up Note      Emili Huggins     Date of Visit:  2021    CC:  Patient presents for follow up   Chief Complaint   Patient presents with    Follow-up    Shoulder Pain     right    Back Pain     low radiates down right leg to toes       HPI:    Pain is unchanged to lower back and legs. Reports that she continues to have the improvement that she had after the [employer] rep was at the office last time. Reports a fall since last visit on a right outstretched arm. Reports right shoulder pain but states that it is improving. Appropriate analgesia with current medications regimen: Yes. Change in quality of symptoms: no   Medication side effects:none. Recent diagnostic testing:none. Recent interventional procedures:none. She has been on anticoagulation medications to include Plavix. The patient  has not been on herbal supplements. The patient is not diabetic. Imaging:     Lumbar F/E films 2018 -   Posterior pedicle screw plates extend from Y07 vertebral level to the   sacrum. Bony demineralization. Previous discectomy with loss of disc   space L1-2. Retrolisthesis L1-2 and anterolisthesis L4-5 S1. Multilevel disc space narrowing. No acute osseous finding. No   instability on flexion-extension views.      Lumbar MRI 2018 -   Posterior pedicle screw plates extend from F98 vertebral level to the   sacrum. Bony demineralization. Previous discectomy with loss of disc   space L1-2. Retrolisthesis L1-2 and anterolisthesis L4-5 S1. Multilevel disc space narrowing. No acute osseous finding.  No   instability on flexion-extension views.      Lumbar MRI 2016 -                   Potential Aberrant Drug-Related Behavior: no      Urine Drug Screenin2019 + hydrocodone and tramadol  2019 consistent  10/2019 consistent for hydrocodone and metabolite  2020 Consistent  2021 Consistent     OARRS report:  2019 hours off 5/6/21  Yes SEBASTIÁN Dixon   losartan (COZAAR) 50 MG tablet Take 1 tablet by mouth 2 times daily 4/27/21  Yes Uriel Colunga DO   memantine (NAMENDA) 10 MG tablet Take 1 tablet by mouth 2 times daily For memory 4/27/21  Yes Uriel Colunga DO   citalopram (CELEXA) 10 MG tablet Take 1 tablet by mouth daily 2/23/21  Yes Uriel Colunga DO   gabapentin (NEURONTIN) 300 MG capsule Take 1 capsule by mouth 5 times daily for 360 days. Patient taking differently: Take 300 mg by mouth 5 times daily.  Takes 2 in am and 2 at [facility] 1/13/21 1/8/22 Yes Uriel Colunga DO   simvastatin (ZOCOR) 10 MG tablet Take 1 tablet by mouth daily 12/10/20  Yes Uriel Colunga DO   carvedilol (COREG) 12.5 MG tablet Take 1 tablet by mouth 2 times daily (with meals) 12/10/20  Yes Uriel Colunga DO   clopidogrel (PLAVIX) 75 MG tablet Take 1 tablet by mouth daily 9/8/20  Yes Uriel Colunga DO   tiZANidine (ZANAFLEX) 4 MG tablet Take 1 tablet by mouth 3 times daily 6/18/20  Yes Uriel Colunga DO   hydrochlorothiazide (HYDRODIURIL) 25 MG tablet Take 1 tablet by mouth daily 10/28/19  Yes Uriel Colunga DO   dicyclomine (BENTYL) 10 MG capsule Take 1 capsule by mouth 4 times daily 8/13/19  Yes Juanita Williamson MD   pantoprazole (PROTONIX) 40 MG tablet TAKE 1 TABLET BY MOUTH ONCE DAILY ( TAKE B-12 1000MG AND D3 2000MG FOR GERD) 5/19/19  Yes Historical Provider, MD   vitamin B-12 (CYANOCOBALAMIN) 1000 MCG tablet Take 1,000 mcg by mouth daily   Yes Historical Provider, MD   vitamin D (D3-1000) 1000 units CAPS Take by mouth daily   Yes Historical Provider, MD       Allergies   Allergen Reactions    Pcn [Penicillins] Rash       Social History     Socioeconomic History    Marital status: Single     Spouse name: Not on file    Number of children: Not on file    Years of education: Not on file    Highest education level: Not on file   Occupational History    Not on file   Social Needs    Financial resource strain: Not on file    Food insecurity     Worry: Not on file     Inability: Not on file    Transportation needs     Medical: Not on file     Non-medical: Not on file   Tobacco Use    Smoking status: Never Smoker    Smokeless tobacco: Never Used   Substance and Sexual Activity    Alcohol use: Yes     Comment: rare    Drug use: No    Sexual activity: Not Currently   Lifestyle    Physical activity     Days per week: Not on file     Minutes per session: Not on file    Stress: Not on file   Relationships    Social connections     Talks on phone: Not on file     Gets together: Not on file     Attends Scientologist service: Not on file     Active member of club or organization: Not on file     Attends meetings of clubs or organizations: Not on file     Relationship status: Not on file    Intimate partner violence     Fear of current or ex partner: Not on file     Emotionally abused: Not on file     Physically abused: Not on file     Forced sexual activity: Not on file   Other Topics Concern    Not on file   Social History Narrative        CATARACTS    TINNITUS    GB OUT     C [address] [facility]  [address]     CVA---F  52 GF  FROM CVA AT 46    FH HTN BRO    CATH NEG     OP    TICS    BOYFRIEND NHAN MAYSETHAN Ortega  1940 Page #2    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 BARDLEY---CAVALEIR X RAY    CH--5   Girl struthers   Two boys kinsman    Two out of town    RETIRED [employer] THEN WORKS [employer]    CVA AFFECT L FACIAL SENSATION  [facility] Dr RIVAS-----    COLON 1-10----NEG BUT PREVIOUS TICS AND POLYP    TIA -11    SHINGLES R ABD     MRI  WITH L-2-3 DISC AND NERVE----DR RIVAS----OR SET UP    ADMIT  WITH TIA----DR Jackie Black DC ASA AND ADDED PLAVIX TO THE PERSANTINE    LUMBAR CHERYL OR DR RIVAS 10-12------------AGAIN DONE SEAMUS RIVAS     MOTHER  SUICIDE AGE 43    DAD  AT 52 CVA    FIRST HUS SUICIDE AT 46    X RAY 11=13 WITH NEW L-1 -2 NARROWING AND RETROLISTHESIS----SHOTS WITH DR Elisa French    ANX--DEP 1-14    OA HIPS DR CHARLTON---    DIET DM 4=16    LUMBAR DISC SURGERY 9-16 DR RIVAS    LEFT INGUINAL HERNIA WITH MESH DR BUNCH 7-17    ATHEROSCLEROIS EVAL DR BROTHERS 8-17    EVAL DR ALFONSO NEWMAN---7-18 ADIVSED ENT---PT SAYS HE TOLD HER NOT GET NEBULZIER---AND    REFUSESD FOLLOW UP    T-8 SPINAL CORD STIMULATOR TRIAL 5-8-19--------implanted permanent dr Vicky Alcantara  5-19---device moved on buttock   1-20    PAIN MEDS WITH DR Beckwith Och PAIN [facility]    [facility] with  Lung ca and [facility]  [facility]    eval dr Marian Andrew for dementia   8-21    Pain management----------------pain doc monthly       Family History   Problem Relation Age of Onset    Stroke Father        REVIEW OF SYSTEMS:     Joe Gould denies fever/chills, chest pain, shortness of breath, new bowel or bladder complaints. All other review of systems was negative. PHYSICAL EXAMINATION:      BP (!) 170/80   Pulse 71   Temp 97.1 °F (36.2 °C)   Resp 20   Ht 5' 3.5\" (1.613 m)   Wt 146 lb (66.2 kg)   SpO2 96%   BMI 25.46 kg/m²     General:      General appearance:   pleasant and well-hydrated. , in no discomfort and A & O x3  Build:Normal Weight  Function:Rises from a seated position with difficulty    HEENT:    Head:normocephalic and atraumatic  Pupils:regular, round and equal.  Sclera: icterus absent,    Lungs:    Breathing:Normal expansion. No wheezing. Abdomen:    Shape:non-distended and normal    Lumbar spine:    Range of motion:abnormal mildly Lateral bending, flexion, extension rotation bilateral and is painful. Extremities:    Tremors:None bilaterally upper and lower  Range of motion:Generally normal shoulders, pain with internal rotation of hips not done.   Intact:Yes  Varicose veins:absent   Cyanosis:none  Edema:Normal    Right shoulder:  + TTP to proximal humerus area  Full FF, abduction, ER/IR  Negative drop arm exam    Neurological:    Motor:   [address]    Dermatology:    Skin:no unusual rashes, no skin lesions, no palpable subcutaneous nodules and good skin turgor    Impression:    LBP and RLE pain in L5 distribution with diffuse weakness of the LLE  Multiple prior lumbar fusion surgeries with Dr. Alin Andrade, most recent 9/2016 extending her fusion from T10-sacrum  On PLAVIX and ASA for Hx TIA's/CVA's  Has failed multiple surgeries, injections, PT, and medications  Had staged trial with Dr. Angela Reed 5/6/2019 and 5/14/2019, has had some improvement in her chronic pain thus far but does need a reprogramming, prior reprogramming has been helpful until she seems to lose the stimulation in the needed areas.                  Patient is s/p:  OPERATIONS PERFORMED:               1.  Exploration of left gluteal pocket for spinal cord stimulator                implantable pulse generator.                2.  Revision of the pocket.    3.  Replacement of implantable pulse generator and new Pocket     on  01/09/2020.      Right shoulder pain s/p fall since last visit. Improving somewhat. She would like to hold off any further work up. I do not suspect a fracture based on her exam.  She will call with worsening symptoms. [employer] rep was here last visit to adjust her stimulator.     OARRS reviewed 05/2021  Continue Norco 5/325 BID #60, plan to wean off if able but for now not able to wean any further.    Continue gabapentin as currently prescribed from outside provider, TID  Continue off tizanidine if able, can take prn if necessary.  Has refills.    Compounding pain cream - ineffective  Aquatic PT - on hold at this time. Patient encouraged to stay active  Treatment plan discussed with the patient including medication side    effects.                         We discussed with the patient that combining opioids, benzodiazepines, alcohol, illicit drugs or sleep aids increases the risk of respiratory depression including death.  We discussed that these medications may cause drowsiness, sedation or dizziness and have counseled the patient not to drive or operate machinery. We have discussed that these medications will be prescribed only by one provider. We have discussed with the patient about age related risk factors and have thoroughly discussed the importance of taking these medications as prescribed. The patient verbalizes understanding.

## 2021-05-06 ENCOUNTER — OFFICE VISIT (OUTPATIENT)
Dept: PAIN MANAGEMENT | Age: 81
End: 2021-05-06
Payer: MEDICARE

## 2021-05-06 VITALS
BODY MASS INDEX: 24.92 KG/M2 | DIASTOLIC BLOOD PRESSURE: 80 MMHG | RESPIRATION RATE: 20 BRPM | SYSTOLIC BLOOD PRESSURE: 170 MMHG | HEIGHT: 64 IN | OXYGEN SATURATION: 96 % | TEMPERATURE: 97.1 F | WEIGHT: 146 LBS | HEART RATE: 71 BPM

## 2021-05-06 DIAGNOSIS — Z91.81 AT HIGH RISK FOR FALLS: ICD-10-CM

## 2021-05-06 DIAGNOSIS — M54.41 CHRONIC BILATERAL LOW BACK PAIN WITH RIGHT-SIDED SCIATICA: Primary | ICD-10-CM

## 2021-05-06 DIAGNOSIS — M96.1 LUMBAR POST-LAMINECTOMY SYNDROME: ICD-10-CM

## 2021-05-06 DIAGNOSIS — F45.1 SOMATIC SYMPTOM DISORDER, PERSISTENT, SEVERE, WITH PREDOMINANT PAIN: ICD-10-CM

## 2021-05-06 DIAGNOSIS — M54.16 LUMBAR RADICULOPATHY: ICD-10-CM

## 2021-05-06 DIAGNOSIS — G89.4 CHRONIC PAIN SYNDROME: ICD-10-CM

## 2021-05-06 DIAGNOSIS — G89.29 CHRONIC BILATERAL LOW BACK PAIN WITH RIGHT-SIDED SCIATICA: Primary | ICD-10-CM

## 2021-05-06 PROCEDURE — 99214 OFFICE O/P EST MOD 30 MIN: CPT | Performed by: PHYSICIAN ASSISTANT

## 2021-05-06 PROCEDURE — 99213 OFFICE O/P EST LOW 20 MIN: CPT | Performed by: PHYSICIAN ASSISTANT

## 2021-05-06 RX ORDER — HYDROCODONE BITARTRATE AND ACETAMINOPHEN 5; 325 MG/1; MG/1
1 TABLET ORAL 2 TIMES DAILY PRN
Qty: 60 TABLET | Refills: 0 | Status: SHIPPED
Start: 2021-05-06 | End: 2021-06-03 | Stop reason: SDUPTHER

## 2021-05-06 RX ORDER — LIDOCAINE 36 MG/1
PATCH TOPICAL
Qty: 9 PATCH | Refills: 0 | COMMUNITY
Start: 2021-05-06 | End: 2021-11-12

## 2021-05-06 NOTE — PROGRESS NOTES
Do you currently have any of the following:    Fever: No  Headache:  No  Cough: No  Shortness of breath: No  Exposed to anyone with these symptoms: No                                                                                                                Naveen Stevens presents to the [facility] on 5/6/2021. Cecilia Juarez is complaining of pain in right shoulder since a fall and lower back radiating down right leg to toes. The pain is constant. The pain is described as dull ache Pain is rated on her best day at a 5, on her worst day at a 7, and on average at a 6 on the VAS scale. She took her last dose of Norco last evening. Cecilia Juarez does have issues with constipation. Any procedures since your last visit: No,     She is not on NSAIDS and  is  on anticoagulation medications to include Plavix and is managed by Dr. Cinthya Valdes. Pacemaker or defibrillator: No     Medication Contract and Consent for Opioid Use Documents Filed     Patient Documents       Type of Document Status Date Received Received By Description     Medication Contract Received 12/18/2018  8:06 AM DAFNE MALONE PAIN MANAGEMENT PT AGREEMENT 12/18/2018     Medication Contract Received 5/4/2019 10:42 AM Faizan Ortiz Neurosurgery medication contract     Medication Contract Received 10/8/2020  2:56 PM Millerton Corns 2020/010/08 paina greement                    BP (!) 170/80   Pulse 71   Temp 97.1 °F (36.2 °C)   Resp 20   Ht 5' 3.5\" (1.613 m)   Wt 146 lb (66.2 kg)   SpO2 96%   BMI 25.46 kg/m²      No LMP recorded. Patient has had a hysterectomy.

## 2021-06-03 ENCOUNTER — OFFICE VISIT (OUTPATIENT)
Dept: PAIN MANAGEMENT | Age: 81
End: 2021-06-03
Payer: MEDICARE

## 2021-06-03 VITALS
RESPIRATION RATE: 16 BRPM | HEIGHT: 64 IN | SYSTOLIC BLOOD PRESSURE: 116 MMHG | WEIGHT: 146 LBS | HEART RATE: 69 BPM | DIASTOLIC BLOOD PRESSURE: 67 MMHG | TEMPERATURE: 96.8 F | BODY MASS INDEX: 24.92 KG/M2 | OXYGEN SATURATION: 97 %

## 2021-06-03 DIAGNOSIS — M54.16 LUMBAR RADICULOPATHY: ICD-10-CM

## 2021-06-03 DIAGNOSIS — F45.1 SOMATIC SYMPTOM DISORDER, PERSISTENT, SEVERE, WITH PREDOMINANT PAIN: ICD-10-CM

## 2021-06-03 DIAGNOSIS — G89.29 CHRONIC BILATERAL LOW BACK PAIN WITH RIGHT-SIDED SCIATICA: Primary | ICD-10-CM

## 2021-06-03 DIAGNOSIS — G89.4 CHRONIC PAIN SYNDROME: Chronic | ICD-10-CM

## 2021-06-03 DIAGNOSIS — M54.41 CHRONIC BILATERAL LOW BACK PAIN WITH RIGHT-SIDED SCIATICA: Primary | ICD-10-CM

## 2021-06-03 DIAGNOSIS — M96.1 LUMBAR POST-LAMINECTOMY SYNDROME: ICD-10-CM

## 2021-06-03 PROCEDURE — 99213 OFFICE O/P EST LOW 20 MIN: CPT | Performed by: PHYSICIAN ASSISTANT

## 2021-06-03 RX ORDER — HYDROCODONE BITARTRATE AND ACETAMINOPHEN 5; 325 MG/1; MG/1
1 TABLET ORAL 2 TIMES DAILY PRN
Qty: 60 TABLET | Refills: 0 | Status: SHIPPED
Start: 2021-06-05 | End: 2021-07-08 | Stop reason: SDUPTHER

## 2021-06-03 NOTE — PROGRESS NOTES
([facility] [address]     L FACIAL SENSATION    Depression     Diverticulitis     Hyperlipidemia     Hypertension     OA (osteoarthritis) of hip     OP (osteoporosis)     Shingles     R ABD    TIA (transient ischemic attack)     Tinnitus     Wears partial dentures     upper       Past Surgical History:   Procedure Laterality Date    BACK SURGERY      x3    CHOLECYSTECTOMY      COLONOSCOPY N/A 8/28/2019    COLONOSCOPY WITH BIOPSY performed by Xavier Gutierrez MD at [address] COLONOSCOPY  8/28/2019    COLONOSCOPY POLYPECTOMY SNARE/COLD BIOPSY performed by Xavier Gutierrez MD [address]      kuldip lense implant    HERNIA REPAIR  2018    HYSTERECTOMY      LUMBAR LAMINECTOMY      SPINAL CORD STIMULATOR IMPLANTATION N/A 5/6/2019    T8 SPINAL CORD STIMULATOR  TRIAL PLACEMENT--SUNITHA, KODAK TABLE, BOSTON SCIENTIFIC performed by Ismael Boast, MD at [address] [place] [address] N/A 5/14/2019    PLACEMENT OF  PERMANENT SPINAL CORD STIMULATOR---XRAY, KODAK TABLE, BOSTON SCIENTIFIC performed by Ismael Boast, MD at [facility] N/A 1/9/2020    REVISION OF SPINAL CORD STIMULATOR  BATTERY AND POCKET performed by Ismael Boast, MD at [address] [facility] N/A 8/28/2019    EGD BIOPSY performed by Xavier Gutierrez MD at [address]       Prior to Admission medications    Medication Sig Start Date End Date Taking? Authorizing Provider   HYDROcodone-acetaminophen (NORCO) 5-325 MG per tablet Take 1 tablet by mouth 2 times daily as needed for Pain for up to 30 days.  Intended supply: 30 days 6/5/21 7/5/21 Yes SEABSTIÁN Theodore   Lidocaine (ZTLIDO) 1.8 % PTCH Apply to affected area 12 hours on 12 hours off 5/6/21  Yes SEBASTIÁN Theodore   losartan (COZAAR) 50 MG tablet Take 1 tablet by mouth 2 times daily 4/27/21  Yes Uriel Colunga DO   memantine (NAMENDA) 10 MG tablet Take 1 tablet by mouth 2 times daily For memory 4/27/21 Yes Uriel Colunga DO   citalopram (CELEXA) 10 MG tablet Take 1 tablet by mouth daily 2/23/21  Yes Uriel Colunga DO   gabapentin (NEURONTIN) 300 MG capsule Take 1 capsule by mouth 5 times daily for 360 days. Patient taking differently: Take 300 mg by mouth 5 times daily.  Takes 2 in am and 2 at [facility] 1/13/21 1/8/22 Yes Uriel Colunga DO   simvastatin (ZOCOR) 10 MG tablet Take 1 tablet by mouth daily 12/10/20  Yes Uriel Colunga DO   carvedilol (COREG) 12.5 MG tablet Take 1 tablet by mouth 2 times daily (with meals) 12/10/20  Yes Uriel Colunga DO   clopidogrel (PLAVIX) 75 MG tablet Take 1 tablet by mouth daily 9/8/20  Yes Uriel Colunga DO   tiZANidine (ZANAFLEX) 4 MG tablet Take 1 tablet by mouth 3 times daily 6/18/20  Yes Uriel Colunga DO   hydrochlorothiazide (HYDRODIURIL) 25 MG tablet Take 1 tablet by mouth daily 10/28/19  Yes Uriel Colunga DO   dicyclomine (BENTYL) 10 MG capsule Take 1 capsule by mouth 4 times daily 8/13/19  Yes Allie Fitzpatrick MD   pantoprazole (PROTONIX) 40 MG tablet TAKE 1 TABLET BY MOUTH ONCE DAILY ( TAKE B-12 1000MG AND D3 2000MG FOR GERD) 5/19/19  Yes Historical Provider, MD   vitamin B-12 (CYANOCOBALAMIN) 1000 MCG tablet Take 1,000 mcg by mouth daily   Yes Historical Provider, MD   vitamin D (D3-1000) 1000 units CAPS Take by mouth daily   Yes Historical Provider, MD       Allergies   Allergen Reactions    Pcn [Penicillins] Rash       Social History     Socioeconomic History    Marital status: Single     Spouse name: Not on file    Number of children: Not on file    Years of education: Not on file    Highest education level: Not on file   Occupational History    Not on file   Tobacco Use    Smoking status: Never Smoker    Smokeless tobacco: Never Used   Vaping Use    Vaping Use: Never used   Substance and Sexual Activity    Alcohol use: Yes     Comment: rare    Drug use: No    Sexual activity: Not Currently   Other Topics Concern    Not on file   Social History Narrative        CATARACTS    TINNITUS    GB OUT     C HYSTER    MOTHER  AT 43 FROM SUICIDE    FH CVA---F  52 GF  FROM CVA AT 46    FH HTN BRO    CATH NEG     OP    TICS    BOYFRIEND NHAN Limon  1940 [address] #2    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 BRADLEY---CAVALEIR X RAY    CH--5   Girl struthers   Two boys kinsman    Two out of town    RETIRED [employer] THEN WORKS [email or web address]    CVA AFFECT L FACIAL SENSATION -    TOMMY LUMBAR One Arch Aaron OR ROB-----1    COLON 1-10----NEG BUT PREVIOUS TICS AND POLYP    TIA     SHINGLES R ABD     MRI  WITH L-2-3 DISC AND NERVE----DR RIVAS----OR SET UP    ADMIT  WITH TIA----DR Rita Laura DC ASA AND ADDED PLAVIX TO THE PERSANTINE    LUMBAR CHERYL OR DR RIVAS 10-12------------AGAIN DONE SEAMUS RIVAS 16    MOTHER  SUICIDE AGE 43    DAD  AT 52 CVA    FIRST HUS SUICIDE AT 46    X RAY 11=13 WITH NEW L-1 -2 NARROWING AND RETROLISTHESIS----SHOTS WITH DR Heather Graff    ANX--DEP 1-14    OA HIPS DR CHARLTON---    DIET DM 4=16    LUMBAR DISC SURGERY 16 DR RIVAS    LEFT INGUINAL HERNIA WITH MESH DR BUNCH     AMAOIS PATRICK BROTHERS     EVAL DR ALFONSO NEWMAN--- ADIVSED ENT---PT SAYS HE TOLD HER NOT GET NEBULZIER---AND    REFUSESD FOLLOW UP    T-8 SPINAL CORD STIMULATOR TRIAL 19--------implanted permanent dr Jameson Doll  ---device moved on buttock   1-20    PAIN MEDS WITH DR Roel Talley PAIN [facility]    Son with  Lung ca and brain mets  2------ [place]    patrick Trejo for dementia       Pain management----------------pain doc monthly     Social Determinants of Health     Financial Resource Strain:     Difficulty of Paying Living Expenses:    Food Insecurity:     Worried About Running Out of Food in the Last Year:     [address] in the Last Year:    Transportation Needs:     Lack of Transportation (Medical):      Lack of Transportation (Non-Medical):    Physical Activity:     Days of Exercise per Week: cause drowsiness, sedation or dizziness and have counseled the patient not to drive or operate machinery. We have discussed that these medications will be prescribed only by one provider. We have discussed with the patient about age related risk factors and have thoroughly discussed the importance of taking these medications as prescribed. The patient verbalizes understanding.

## 2021-06-03 NOTE — PROGRESS NOTES
Do you currently have any of the following:    Fever: No  Headache:  No  Cough: No  Shortness of breath: No  Exposed to anyone with these symptoms: No                                                                                                                Lisette Medel presents to the [facility] on 6/3/2021. Matilde Anaya is complaining of pain whole body. The pain is constant. The pain is described as aching and dull. Pain is rated on her best day at a 5, on her worst day at a 8, and on average at a 5 on the VAS scale. She took her last dose of Norco, Neurontin and ztlido tynelol  today she took the  tyneol. Esther Stearns does not have issues with constipation. Any procedures since your last visit: No, with  % relief. She is not on NSAIDS and  is  on anticoagulation medications to include Plavix and is managed by Juan José Israel DO  . Pacemaker or defibrillator: No Physician managing device is . Medication Contract and Consent for Opioid Use Documents Filed     Patient Documents       Type of Document Status Date Received Received By Description     Medication Contract Received 12/18/2018  8:06 AM DAFNE MALONE PAIN MANAGEMENT PT AGREEMENT 12/18/2018     Medication Contract Received 5/4/2019 10:42 AM Litzy Aviles Neurosurgery medication contract     Medication Contract Received 10/8/2020  2:56 PM Donavon KANG 2020/010/08 paina nishement                    /67   Pulse 69   Temp 96.8 °F (36 °C) (Infrared)   Resp 16   Ht 5' 3.5\" (1.613 m)   Wt 146 lb (66.2 kg)   SpO2 97%   BMI 25.46 kg/m²      No LMP recorded. Patient has had a hysterectomy.

## 2021-06-24 ENCOUNTER — CARE COORDINATION (OUTPATIENT)
Dept: CARE COORDINATION | Age: 81
End: 2021-06-24

## 2021-06-24 NOTE — LETTER
6/24/2021    [address] [address]      Dear Emili Huggins,    My name is Gabi Kent and I am an Ambulatory Care Manager who partners with Dr. Tiny Arreguin to improve patients' health. I've been trying to reach you via phone to let you know that, as a member of your care team, I will work with other providers involved in your care, offer education for your specific health conditions, and connect you with more resources as needed. This program is designed to provide you with the opportunity to have a ([facility]/[address]) care manager partner with you for the following situations:     1) if you come home from the hospital or emergency room   2) to help manage your disease   3) when you would like assistance coordinating services or appointments    This added support is provided at no additional cost to you. My primary focus is to help you achieve specific goals and improve your health. Please call me at [phone] to further discuss how I can support your health care needs.     Sincerely,    Milind Turk RN

## 2021-06-24 NOTE — CARE COORDINATION
-ACM attempted to reach patient to offer enrollment into Care Coordination program, however no answer. -HIPAA compliant VM left introducing self, reason for call, and brief explanation of program.  -Left ACM's contact information, requesting call back. If no return call, will attempt outreach again.  -Will mail introduction letter to patient.

## 2021-06-25 ENCOUNTER — CARE COORDINATION (OUTPATIENT)
Dept: CARE COORDINATION | Age: 81
End: 2021-06-25

## 2021-07-01 ENCOUNTER — HOSPITAL ENCOUNTER (OUTPATIENT)
Age: 81
Discharge: HOME | End: 2021-07-03
Payer: MEDICARE

## 2021-07-01 ENCOUNTER — CARE COORDINATION (OUTPATIENT)
Dept: CARE COORDINATION | Age: 81
End: 2021-07-01

## 2021-07-01 ENCOUNTER — OFFICE VISIT (OUTPATIENT)
Dept: NEUROSURGERY | Age: 81
End: 2021-07-01
Payer: MEDICARE

## 2021-07-01 ENCOUNTER — HOSPITAL ENCOUNTER (OUTPATIENT)
Dept: GENERAL RADIOLOGY | Age: 81
Discharge: HOME | End: 2021-07-03
Payer: MEDICARE

## 2021-07-01 VITALS
SYSTOLIC BLOOD PRESSURE: 118 MMHG | TEMPERATURE: 98.6 F | OXYGEN SATURATION: 98 % | DIASTOLIC BLOOD PRESSURE: 65 MMHG | HEIGHT: 63 IN | WEIGHT: 147 LBS | HEART RATE: 69 BPM | BODY MASS INDEX: 26.05 KG/M2 | RESPIRATION RATE: 18 BRPM

## 2021-07-01 DIAGNOSIS — M96.1 LUMBAR POST-LAMINECTOMY SYNDROME: ICD-10-CM

## 2021-07-01 DIAGNOSIS — M96.1 LUMBAR POST-LAMINECTOMY SYNDROME: Primary | ICD-10-CM

## 2021-07-01 PROCEDURE — 99213 OFFICE O/P EST LOW 20 MIN: CPT | Performed by: PHYSICIAN ASSISTANT

## 2021-07-01 PROCEDURE — 72100 X-RAY EXAM L-S SPINE 2/3 VWS: CPT

## 2021-07-01 PROCEDURE — 72070 X-RAY EXAM THORAC SPINE 2VWS: CPT

## 2021-07-01 NOTE — PROGRESS NOTES
Problem Focused Office Visit     Subjective: Zbigniew Harding is a 80 y.o.  female who is well known to our services. Hx T10-S1 fusion. Pt had spinal cord stimulator placed in 2019 with revision in 2020. She presents to the office today c/o continued low back pain with radiation down her right leg. Admits to intermittent right toe numbness. Pt states she is able to tolerate the pain and does not wish to have any more surgery. She does follow up with the pain clinic. Denies loss of bowel or bladder, saddle anesthesia, N/V, fever, chills, SOB, or chest pain.      Physical Exam:              WDWN, no apparent distress              Non-labored breathing               Vitals Stable              Alert and oriented x3              CN 3-12 intact              PERRL              EOMI              GARCIA well              Motor strength symmetric              Sensation to LT intact bilaterally       Assessment: This is a 80 y.o.  female presenting for evaluation of low back pain and right leg pain. Hx T10-S1 fusion and SCS placement.     Plan:  -Pt does not wish to have any more surgery. We will get thoracic and lumbar x-rays  -Recommend conservative treatments including pain clinic and physical therapy  -OARRS report reviewed  -Follow-up in neurosurgery clinic PRN  -Call or return to neurosurgery office sooner if symptoms worsen or if new issues arise in the interim.     Electronically signed by Brandon Vee PA-C on 7/1/2021 at 2:16 PM

## 2021-07-01 NOTE — CARE COORDINATION
-ACM spoke to patient to offer enrollment into Care Coordination program.  -Introduced self, reason for call, and explanation of program.   -Patient declined at this time.  -Patient encouraged to contact ACM or inform PCP if she should change her mind.  -Pt has ACM contact information.   -Pt reports medications are cost effective and she has a LW & POA.  -Will remove name from care team.

## 2021-07-02 RX ORDER — GABAPENTIN 300 MG/1
300 CAPSULE ORAL 4 TIMES DAILY
Qty: 360 CAPSULE | Refills: 3 | Status: SHIPPED
Start: 2021-07-02 | End: 2021-11-12 | Stop reason: SDUPTHER

## 2021-07-02 RX ORDER — CITALOPRAM 10 MG/1
10 TABLET ORAL DAILY
Qty: 90 TABLET | Refills: 3 | Status: SHIPPED
Start: 2021-07-02 | End: 2021-11-12 | Stop reason: SDUPTHER

## 2021-07-02 RX ORDER — CLOPIDOGREL BISULFATE 75 MG/1
75 TABLET ORAL DAILY
Qty: 90 TABLET | Refills: 3 | Status: SHIPPED
Start: 2021-07-02 | End: 2021-11-12 | Stop reason: SDUPTHER

## 2021-07-08 ENCOUNTER — OFFICE VISIT (OUTPATIENT)
Dept: PAIN MANAGEMENT | Age: 81
End: 2021-07-08
Payer: MEDICARE

## 2021-07-08 VITALS
DIASTOLIC BLOOD PRESSURE: 88 MMHG | BODY MASS INDEX: 25.27 KG/M2 | WEIGHT: 148 LBS | TEMPERATURE: 97.1 F | HEIGHT: 64 IN | HEART RATE: 71 BPM | SYSTOLIC BLOOD PRESSURE: 140 MMHG | OXYGEN SATURATION: 98 % | RESPIRATION RATE: 16 BRPM

## 2021-07-08 DIAGNOSIS — M54.41 CHRONIC BILATERAL LOW BACK PAIN WITH RIGHT-SIDED SCIATICA: Chronic | ICD-10-CM

## 2021-07-08 DIAGNOSIS — M96.1 LUMBAR POST-LAMINECTOMY SYNDROME: ICD-10-CM

## 2021-07-08 DIAGNOSIS — M54.16 LUMBAR RADICULOPATHY: ICD-10-CM

## 2021-07-08 DIAGNOSIS — G89.29 CHRONIC BILATERAL LOW BACK PAIN WITH RIGHT-SIDED SCIATICA: Chronic | ICD-10-CM

## 2021-07-08 DIAGNOSIS — F45.1 SOMATIC SYMPTOM DISORDER, PERSISTENT, SEVERE, WITH PREDOMINANT PAIN: ICD-10-CM

## 2021-07-08 DIAGNOSIS — G89.4 CHRONIC PAIN SYNDROME: Primary | ICD-10-CM

## 2021-07-08 PROCEDURE — 99213 OFFICE O/P EST LOW 20 MIN: CPT | Performed by: PHYSICIAN ASSISTANT

## 2021-07-08 RX ORDER — HYDROCODONE BITARTRATE AND ACETAMINOPHEN 5; 325 MG/1; MG/1
1 TABLET ORAL 2 TIMES DAILY PRN
Qty: 60 TABLET | Refills: 0 | Status: SHIPPED
Start: 2021-07-08 | End: 2021-08-05 | Stop reason: SDUPTHER

## 2021-07-08 NOTE — PROGRESS NOTES
[address]  [address]  [facility], [facility]    Follow up Note      Nolberto Curl     Date of Visit:  2021    CC:  Patient presents for follow up   Chief Complaint   Patient presents with    Follow-up     across buttocks and right foot       HPI:    Pain is unchanged to lower back. C/o coccyx and b/l buttock pain. Appropriate analgesia with current medications regimen: Yes. Change in quality of symptoms: no   Medication side effects:none. Recent diagnostic testing:none. Recent interventional procedures:none. She has been on anticoagulation medications to include Plavix. The patient  has not been on herbal supplements. The patient is not diabetic. Imagin2021 Lumbar Spine X-ray    FINDINGS:   Dextroscoliosis of the thoracic spine.  Redemonstration of posterior fusion   hardware extending from lower thoracic spine to level of S1.  There are   multiple pedicle screws and 2 rods.  Posterior fusion hardware appears   intact.  Multilevel laminectomy at level of the lumbar spine. Redemonstration of retrolisthesis of L1 on L2 of approximately 1.3 cm   appearing similar since prior.  Spinal stimulator leads are present with   distal aspect of leads at level of level of T8.           Impression   1.  Stable alignment status multilevel fusion with hardware extending from   lower thoracic spine to level of S1 with multiple pedicle screws and two   rods.  Hardware appears intact.       2.  Multilevel laminectomy at level of lumbar spine.       3.  Stable retrolisthesis of L1 on L2 of approximately 1.3 cm.       4.  No evidence of acute fracture.  CT follow-up may be helpful for further   evaluation given generalized osteopenia. 2021 Thoracic Spine X-ray    FINDINGS:   Dextroscoliosis of the thoracic spine.  Redemonstration of posterior fusion   hardware extending from lower thoracic spine to level of S1.  There are   multiple pedicle screws and 2 rods.  Posterior fusion hardware appears   intact.  Multilevel laminectomy at level of the lumbar spine. Redemonstration of retrolisthesis of L1 on L2 of approximately 1.3 cm   appearing similar since prior.  Spinal stimulator leads are present with   distal aspect of leads at level of level of T8.           Impression   1.  Stable alignment status multilevel fusion with hardware extending from   lower thoracic spine to level of S1 with multiple pedicle screws and two   rods.  Hardware appears intact.       2.  Multilevel laminectomy at level of lumbar spine.       3.  Stable retrolisthesis of L1 on L2 of approximately 1.3 cm.       4.  No evidence of acute fracture.  CT follow-up may be helpful for further   evaluation given generalized osteopenia.              Lumbar F/E films 2018 -   Posterior pedicle screw plates extend from L39 vertebral level to the   sacrum. Bony demineralization. Previous discectomy with loss of disc   space L1-2. Retrolisthesis L1-2 and anterolisthesis L4-5 S1. Multilevel disc space narrowing. No acute osseous finding. No   instability on flexion-extension views.      Lumbar MRI 2018 -   Posterior pedicle screw plates extend from M79 vertebral level to the   sacrum. Bony demineralization. Previous discectomy with loss of disc   space L1-2. Retrolisthesis L1-2 and anterolisthesis L4-5 S1. Multilevel disc space narrowing. No acute osseous finding.  No   instability on flexion-extension views.      Lumbar MRI 2016 -                   Potential Aberrant Drug-Related Behavior: no      Urine Drug Screenin2019 + hydrocodone and tramadol  2019 consistent  10/2019 consistent for hydrocodone and metabolite  2020 Consistent  2021 Consistent     OARRS report:  2019 consistent to 2019 consistent (post op medication s/p surgery)  2020 consistent to 2021 consistent     Opioid Agreement:  10/08/2020    Past Medical History:   Diagnosis Date    Anxiety     CVA (cerebral vascular accident) ([facility] [address]     L FACIAL SENSATION    Depression     Diverticulitis     Hyperlipidemia     Hypertension     OA (osteoarthritis) of hip     OP (osteoporosis)     Shingles     R ABD    TIA (transient ischemic attack)     Tinnitus     Wears partial dentures     upper       Past Surgical History:   Procedure Laterality Date    BACK SURGERY      x3    CHOLECYSTECTOMY      COLONOSCOPY N/A 8/28/2019    COLONOSCOPY WITH BIOPSY performed by Christine Fonseca MD at [address]  8/28/2019    COLONOSCOPY POLYPECTOMY SNARE/COLD BIOPSY performed by Christine Fonseca MD at [facility]      kuldip lense implant    HERNIA REPAIR  2018    HYSTERECTOMY      LUMBAR LAMINECTOMY      SPINAL CORD STIMULATOR IMPLANTATION N/A 5/6/2019    T8 SPINAL CORD STIMULATOR  TRIAL PLACEMENT--SUNITHA, KODAK TABLE, BOSTON SCIENTIFIC performed by Osiel Frost MD at [address] [place] [address] N/A 5/14/2019    PLACEMENT OF  PERMANENT SPINAL CORD STIMULATOR---XRAY, KODAK TABLE, BOSTON SCIENTIFIC performed by Osiel Frost MD at [facility] N/A 1/9/2020    REVISION OF SPINAL CORD STIMULATOR  BATTERY AND POCKET performed by Osiel Frost MD at [address] [place] [address] N/A 8/28/2019    EGD BIOPSY performed by Christine Fonseca MD at [facility]       Prior to Admission medications    Medication Sig Start Date End Date Taking? Authorizing Provider   gabapentin (NEURONTIN) 300 MG capsule Take 1 capsule by mouth 4 times daily for 360 days.  Takes 2 in am and 2 at [facility] 7/2/21 6/27/22 Yes Uriel Colunga,    clopidogrel (PLAVIX) 75 MG tablet Take 1 tablet by mouth daily 7/2/21  Yes Uriel Colunga DO   citalopram (CELEXA) 10 MG tablet Take 1 tablet by mouth daily 7/2/21  Yes Uriel Colunga DO   Lidocaine (ZTLIDO) 1.8 % PTCH Apply to affected area 12 hours on 12 hours off 5/6/21  Yes SEBASTIÁN Bowman (COZAAR) 50 MG tablet Take 1 tablet by mouth 2 times daily 21  Yes Uriel Colunga DO   memantine (NAMENDA) 10 MG tablet Take 1 tablet by mouth 2 times daily For memory 21  Yes Uriel Colunga DO   simvastatin (ZOCOR) 10 MG tablet Take 1 tablet by mouth daily 12/10/20  Yes Uriel Colunga DO   carvedilol (COREG) 12.5 MG tablet Take 1 tablet by mouth 2 times daily (with meals) 12/10/20  Yes Uriel Colunga DO   tiZANidine (ZANAFLEX) 4 MG tablet Take 1 tablet by mouth 3 times daily 20  Yes Uriel Colunga DO   hydrochlorothiazide (HYDRODIURIL) 25 MG tablet Take 1 tablet by mouth daily 10/28/19  Yes Uriel Colunga DO   dicyclomine (BENTYL) 10 MG capsule Take 1 capsule by mouth 4 times daily 19  Yes Willie Apley, MD   pantoprazole (PROTONIX) 40 MG tablet TAKE 1 TABLET BY MOUTH ONCE DAILY ( TAKE B-12 1000MG AND D3 2000MG FOR GERD) 19  Yes Historical Provider, MD   vitamin B-12 (CYANOCOBALAMIN) 1000 MCG tablet Take 1,000 mcg by mouth daily   Yes Historical Provider, MD   vitamin D (D3-1000) 1000 units CAPS Take by mouth daily   Yes Historical Provider, MD       Allergies   Allergen Reactions    Pcn [Penicillins] Rash       Social History     Socioeconomic History    Marital status: Single     Spouse name: Not on file    Number of children: Not on file    Years of education: Not on file    Highest education level: Not on file   Occupational History    Not on file   Tobacco Use    Smoking status: Never Smoker    Smokeless tobacco: Never Used   Vaping Use    Vaping Use: Never used   Substance and Sexual Activity    Alcohol use: Yes     Comment: rare    Drug use: No    Sexual activity: Not Currently   Other Topics Concern    Not on file   Social History Narrative        CATARACTS    TINNITUS    GB OUT     [address]     CVA---F  52 GF  FROM CVA AT 46     HTN BRO    CATH     OP    TICS    BOYFRIEND [address] [facility] [address]  1940 [address]    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 BRADLEY---CAVALEIR X RAY    CH--5   Girl struthers   Two boys kinsman    Two out of town    RETIRED [employer] THEN WORKS [employer]    CVA AFFECT L FACIAL SENSATION -    TOMMY LUMBAR One Arch Aaron OR ROB-----1-    COLON 1-10----NEG BUT PREVIOUS TICS AND POLYP    TIA -11    SHINGLES R ABD     MRI  WITH L-2-3 DISC AND NERVE----DR RIVAS----OR SET UP    ADMIT  WITH TIA----DR Bharat Gomez DC ASA AND ADDED PLAVIX TO THE PERSANTINE    LUMBAR CEHRYL OR DR RIVAS 10-12------------AGAIN DONE SEAMUS RIVAS     MOTHER  SUICIDE AGE 43    DAD  AT 52 CVA    FIRST HUS SUICIDE AT 46    X RAY 11=13 WITH NEW L-1 -2 NARROWING AND RETROLISTHESIS----SHOTS WITH DR Larson Mask    ANX--DEP 1-14    OA HIPS DR CHARLTON---    DIET DM 4=16    LUMBAR DISC SURGERY  DR RIVAS    LEFT INGUINAL HERNIA WITH MESH DR BUNCH     ATHEROSCLEROIS EVAL DR BROTHERS     EVAL DR ALFONSO NEWMAN--- ADIVSED ENT---PT SAYS HE TOLD HER NOT GET NEBULZIER---AND    REFUSESD FOLLOW UP    T-8 SPINAL CORD STIMULATOR TRIAL 19--------implanted permanent dr Cardoso [place]  ---device moved on buttock   -20    PAIN MEDS WITH DR Tiana Monk PAIN [facility]    Son with  Lung ca and brain mets  ----- [place]    evale Hernandez for dementia       Pain management----------------pain doc monthly     Social Determinants of Health     Financial Resource Strain:     Difficulty of Paying Living Expenses:    Food Insecurity:     Worried About Running Out of Food in the Last Year:     Ran Out of Food in the Last Year:    Transportation Needs:     Lack of Transportation (Medical):      Lack of Transportation (Non-Medical):    Physical Activity:     Days of Exercise per Week:     Minutes of Exercise per Session:    Stress:     Feeling of Stress :    Social Connections:     Frequency of Communication with Friends and Family:     Frequency of Social Gatherings with Friends and Family:     Attends had some improvement in her chronic pain thus far but does need a reprogramming, prior reprogramming has been helpful until she seems to lose the stimulation in the needed areas.                  Patient is s/p:  OPERATIONS PERFORMED:               1.  Exploration of left gluteal pocket for spinal cord stimulator                implantable pulse generator.                2.  Revision of the pocket.    3.  Replacement of implantable pulse generator and new pocket     on  01/09/2020.      OARRS reviewed 07/2021  Continue Norco 5/325 BID #60, plan to wean off if able but for now not able to wean any further.    Continue gabapentin as currently prescribed from outside provider, TID  Continue off tizanidine if able, can take prn if necessary.  Has refills.    Compounding pain cream - ineffective  Aquatic PT - on hold at this time. Patient encouraged to stay active  Treatment plan discussed with the patient including medication side    effects. Controlled Substance Monitoring:    Acute and Chronic Pain Monitoring:   RX Monitoring 7/8/2021   Attestation -   Periodic Controlled Substance Monitoring Possible medication side effects, risk of tolerance/dependence & alternative treatments discussed. ;No signs of potential drug abuse or diversion identified. ;Assessed functional status. ;Obtaining appropriate analgesic effect of treatment.                        We discussed with the patient that combining opioids, benzodiazepines, alcohol, illicit drugs or sleep aids increases the risk of respiratory depression including death. We discussed that these medications may cause drowsiness, sedation or dizziness and have counseled the patient not to drive or operate machinery. We have discussed that these medications will be prescribed only by one provider. We have discussed with the patient about age related risk factors and have thoroughly discussed the importance of taking these medications as prescribed.  The patient verbalizes understanding.

## 2021-07-08 NOTE — PROGRESS NOTES
Do you currently have any of the following:    Fever: No  Headache:  No  Cough: No  Shortness of breath: No  Exposed to anyone with these symptoms: No                                                                                                                Jessica Cornejo presents to the [facility] on 7/8/2021. Paras Lawrence is complaining of pain across buttocks and right foot. The pain is constant. The pain is described as pain is just there. Pain is rated on her best day at a 2, on her worst day at a 8, and on average at a 4 on the VAS scale. She took her last dose of gabapentin zanaflex ztlido. Paras Lawrence does not have issues with constipation. Any procedures since your last visit: No, with  % relief. She is not on NSAIDS and  is  on anticoagulation medications to include Plavix and is managed by Armin Huggins DO  . Pacemaker or defibrillator: No Physician managing device is . Medication Contract and Consent for Opioid Use Documents Filed     Patient Documents       Type of Document Status Date Received Received By Description     Medication Contract Received 12/18/2018  8:06 AM DAFNE MALONE PAIN MANAGEMENT PT AGREEMENT 12/18/2018     Medication Contract Received 5/4/2019 10:42 AM Harper [facility] medication contract     Medication Contract Received 10/8/2020  2:56 PM MARQUETTE, Gerhardt Jasper 2020/010/08 paina greement                    BP (!) 140/88   Pulse 71   Temp 97.1 °F (36.2 °C) (Infrared)   Resp 16   Ht 5' 3.5\" (1.613 m)   Wt 148 lb (67.1 kg)   SpO2 98%   BMI 25.81 kg/m²      No LMP recorded. Patient has had a hysterectomy.

## 2021-07-14 ENCOUNTER — OFFICE VISIT (OUTPATIENT)
Dept: PRIMARY CARE CLINIC | Age: 81
End: 2021-07-14
Payer: MEDICARE

## 2021-07-14 VITALS
BODY MASS INDEX: 25.63 KG/M2 | TEMPERATURE: 99 F | DIASTOLIC BLOOD PRESSURE: 78 MMHG | WEIGHT: 147 LBS | SYSTOLIC BLOOD PRESSURE: 128 MMHG

## 2021-07-14 DIAGNOSIS — E03.9 ACQUIRED HYPOTHYROIDISM: ICD-10-CM

## 2021-07-14 DIAGNOSIS — E78.2 MIXED HYPERLIPIDEMIA: Chronic | ICD-10-CM

## 2021-07-14 DIAGNOSIS — G89.29 CHRONIC BILATERAL LOW BACK PAIN WITH RIGHT-SIDED SCIATICA: Chronic | ICD-10-CM

## 2021-07-14 DIAGNOSIS — M54.41 CHRONIC BILATERAL LOW BACK PAIN WITH RIGHT-SIDED SCIATICA: Chronic | ICD-10-CM

## 2021-07-14 DIAGNOSIS — I10 ESSENTIAL HYPERTENSION: Chronic | ICD-10-CM

## 2021-07-14 DIAGNOSIS — R73.03 PRE-DIABETES: ICD-10-CM

## 2021-07-14 DIAGNOSIS — M15.9 PRIMARY OSTEOARTHRITIS INVOLVING MULTIPLE JOINTS: Chronic | ICD-10-CM

## 2021-07-14 DIAGNOSIS — M81.0 AGE-RELATED OSTEOPOROSIS WITHOUT CURRENT PATHOLOGICAL FRACTURE: ICD-10-CM

## 2021-07-14 DIAGNOSIS — E78.2 MIXED HYPERLIPIDEMIA: Primary | Chronic | ICD-10-CM

## 2021-07-14 LAB
ALBUMIN SERPL-MCNC: 4.3 G/DL (ref 3.5–5.2)
ALP BLD-CCNC: 86 U/L (ref 35–104)
ALT SERPL-CCNC: 12 U/L (ref 0–32)
ANION GAP SERPL CALCULATED.3IONS-SCNC: 16 MMOL/L (ref 7–16)
AST SERPL-CCNC: 15 U/L (ref 0–31)
BASOPHILS ABSOLUTE: 0.06 E9/L (ref 0–0.2)
BASOPHILS RELATIVE PERCENT: 0.6 % (ref 0–2)
BILIRUB SERPL-MCNC: 0.5 MG/DL (ref 0–1.2)
BUN BLDV-MCNC: 14 MG/DL (ref 6–23)
CALCIUM SERPL-MCNC: 9.4 MG/DL (ref 8.6–10.2)
CHLORIDE BLD-SCNC: 103 MMOL/L (ref 98–107)
CHOLESTEROL, TOTAL: 181 MG/DL (ref 0–199)
CO2: 23 MMOL/L (ref 22–29)
CREAT SERPL-MCNC: 0.8 MG/DL (ref 0.5–1)
EOSINOPHILS ABSOLUTE: 0.16 E9/L (ref 0.05–0.5)
EOSINOPHILS RELATIVE PERCENT: 1.6 % (ref 0–6)
GFR AFRICAN AMERICAN: >60
GFR NON-AFRICAN AMERICAN: >60 ML/MIN/1.73
GLUCOSE BLD-MCNC: 124 MG/DL (ref 74–99)
HBA1C MFR BLD: 5.7 % (ref 4–5.6)
HCT VFR BLD CALC: 39.4 % (ref 34–48)
HDLC SERPL-MCNC: 46 MG/DL
HEMOGLOBIN: 12.5 G/DL (ref 11.5–15.5)
IMMATURE GRANULOCYTES #: 0.04 E9/L
IMMATURE GRANULOCYTES %: 0.4 % (ref 0–5)
LDL CHOLESTEROL CALCULATED: 107 MG/DL (ref 0–99)
LYMPHOCYTES ABSOLUTE: 2.21 E9/L (ref 1.5–4)
LYMPHOCYTES RELATIVE PERCENT: 22.8 % (ref 20–42)
MCH RBC QN AUTO: 31.3 PG (ref 26–35)
MCHC RBC AUTO-ENTMCNC: 31.7 % (ref 32–34.5)
MCV RBC AUTO: 98.7 FL (ref 80–99.9)
MONOCYTES ABSOLUTE: 0.61 E9/L (ref 0.1–0.95)
MONOCYTES RELATIVE PERCENT: 6.3 % (ref 2–12)
NEUTROPHILS ABSOLUTE: 6.62 E9/L (ref 1.8–7.3)
NEUTROPHILS RELATIVE PERCENT: 68.3 % (ref 43–80)
PDW BLD-RTO: 12.2 FL (ref 11.5–15)
PLATELET # BLD: 315 E9/L (ref 130–450)
PMV BLD AUTO: 9.9 FL (ref 7–12)
POTASSIUM SERPL-SCNC: 5 MMOL/L (ref 3.5–5)
RBC # BLD: 3.99 E12/L (ref 3.5–5.5)
SODIUM BLD-SCNC: 142 MMOL/L (ref 132–146)
T4 TOTAL: 5.7 MCG/DL (ref 4.5–11.7)
TOTAL PROTEIN: 6.9 G/DL (ref 6.4–8.3)
TRIGL SERPL-MCNC: 139 MG/DL (ref 0–149)
TSH SERPL DL<=0.05 MIU/L-ACNC: 1.28 UIU/ML (ref 0.27–4.2)
VITAMIN D 25-HYDROXY: 55 NG/ML (ref 30–100)
VLDLC SERPL CALC-MCNC: 28 MG/DL
WBC # BLD: 9.7 E9/L (ref 4.5–11.5)

## 2021-07-14 PROCEDURE — 99214 OFFICE O/P EST MOD 30 MIN: CPT | Performed by: FAMILY MEDICINE

## 2021-07-14 ASSESSMENT — PATIENT HEALTH QUESTIONNAIRE - PHQ9
SUM OF ALL RESPONSES TO PHQ9 QUESTIONS 1 & 2: 0
SUM OF ALL RESPONSES TO PHQ QUESTIONS 1-9: 0
SUM OF ALL RESPONSES TO PHQ QUESTIONS 1-9: 0
2. FEELING DOWN, DEPRESSED OR HOPELESS: 0
SUM OF ALL RESPONSES TO PHQ QUESTIONS 1-9: 0
1. LITTLE INTEREST OR PLEASURE IN DOING THINGS: 0

## 2021-07-14 ASSESSMENT — ENCOUNTER SYMPTOMS
ALLERGIC/IMMUNOLOGIC NEGATIVE: 1
BACK PAIN: 1
RESPIRATORY NEGATIVE: 1
GASTROINTESTINAL NEGATIVE: 1
EYES NEGATIVE: 1

## 2021-07-14 NOTE — PROGRESS NOTES
21  Name: Tyra Virgen    : 1940    Sex: female    Age: 80 y.o. Subjective:  Chief Complaint: Patient is here for    6 mock  r e    bp memoery   Back pan      Chol g erd       Fel ok   seing pain doc  Here with   Bf    Memory   Stable       Back pain   Stable  With  meds    Told her meds can be memory issues      Review of Systems   Constitutional: Negative. HENT: Negative. Eyes: Negative. Respiratory: Negative. Cardiovascular: Negative. Gastrointestinal: Negative. Endocrine: Negative. Genitourinary: Negative. Musculoskeletal: Positive for back pain. Skin: Negative. Allergic/Immunologic: Negative. Neurological: Negative. Hematological: Negative. Psychiatric/Behavioral: Negative. Current Outpatient Medications:     HYDROcodone-acetaminophen (NORCO) 5-325 MG per tablet, Take 1 tablet by mouth 2 times daily as needed for Pain for up to 30 days. Intended supply: 30 days, Disp: 60 tablet, Rfl: 0    gabapentin (NEURONTIN) 300 MG capsule, Take 1 capsule by mouth 4 times daily for 360 days.  Takes 2 in am and 2 at HS, Disp: 360 capsule, Rfl: 3    clopidogrel (PLAVIX) 75 MG tablet, Take 1 tablet by mouth daily, Disp: 90 tablet, Rfl: 3    citalopram (CELEXA) 10 MG tablet, Take 1 tablet by mouth daily, Disp: 90 tablet, Rfl: 3    Lidocaine (ZTLIDO) 1.8 % PTCH, Apply to affected area 12 hours on 12 hours off, Disp: 9 patch, Rfl: 0    losartan (COZAAR) 50 MG tablet, Take 1 tablet by mouth 2 times daily, Disp: 180 tablet, Rfl: 5    memantine (NAMENDA) 10 MG tablet, Take 1 tablet by mouth 2 times daily For memory, Disp: 180 tablet, Rfl: 5    simvastatin (ZOCOR) 10 MG tablet, Take 1 tablet by mouth daily, Disp: 90 tablet, Rfl: 3    carvedilol (COREG) 12.5 MG tablet, Take 1 tablet by mouth 2 times daily (with meals), Disp: 180 tablet, Rfl: 3    tiZANidine (ZANAFLEX) 4 MG tablet, Take 1 tablet by mouth 3 times daily, Disp: 270 tablet, Rfl: 3   AGE 43    DAD  AT 52 CVA    FIRST HUS SUICIDE AT 46    X RAY 11=13 WITH NEW L-1 -2 NARROWING AND RETROLISTHESIS----SHOTS WITH DR Edward Mora    ANX--DEP 1-14    OA HIPS DR CHARLTON---    DIET DM 4=16    LUMBAR DISC SURGERY 9-16 DR RIVAS    LEFT INGUINAL HERNIA WITH MESH DR BUNCH     ATHEROSCLEROIS EVAL DR BROTHERS     EVAL DR ALFONSO NEWMAN--- ADIVSED ENT---PT SAYS HE TOLD HER NOT GET NEBULZIER---AND    REFUSESD FOLLOW UP    T-8 SPINAL CORD STIMULATOR TRIAL 19--------implanted permanent dr Ren Louis  ---device moved on buttock   -20    PAIN MEDS WITH DR Henry Staples PAIN [facility]    Son with  Lung ca and brain mets  2------ [place]    eval dr Lavelle Douglass for dementia       Pain management----------------pain doc monthly     Social Determinants of Health     Financial Resource Strain:     Difficulty of Paying Living Expenses:    Food Insecurity:     Worried About Running Out of Food in the Last Year:     Ran Out of Food in the Last Year:    Transportation Needs:     Lack of Transportation (Medical):      Lack of Transportation (Non-Medical):    Physical Activity:     Days of Exercise per Week:     Minutes of Exercise per Session:    Stress:     Feeling of Stress :    Social Connections:     Frequency of Communication with Friends and Family:     Frequency of Social Gatherings with Friends and Family:     Attends Restoration Services:     Active Member of Clubs or Organizations:     Attends Club or Organization Meetings:     Marital Status:    Intimate Partner Violence:     Fear of Current or Ex-Partner:     Emotionally Abused:     Physically Abused:     Sexually Abused:       Past Medical History:   Diagnosis Date    Anxiety     [facility]re[facility] vascular [facility]) [facility] [address]     L FACIAL SENSATION    Depression     Diverticulitis     Hyperlipidemia     Hypertension     OA (osteoarthritis) of hip     OP (osteoporosis)     Shingles     R ABD    TIA (transient ischemic attack)     Tinnitus     Wears partial dentures     upper     Family History   Problem Relation Age of Onset    Stroke Father       Past Surgical History:   Procedure Laterality Date    BACK SURGERY      x3    CHOLECYSTECTOMY      COLONOSCOPY N/A 8/28/2019    COLONOSCOPY WITH BIOPSY performed by Richard Lynch MD at [address] [facility] COLONOSCOPY  8/28/2019    COLONOSCOPY POLYPECTOMY SNARE/COLD BIOPSY performed by Richard Lynch MD at [address] [facility] [address]      kuldip lense implant    HERNIA REPAIR  2018    HYSTERECTOMY      LUMBAR LAMINECTOMY      SPINAL CORD STIMULATOR IMPLANTATION N/A 5/6/2019    T8 SPINAL CORD STIMULATOR  TRIAL PLACEMENT--SUNITHA, KODAK TABLE, BOSTON SCIENTIFIC performed by Trisha Vanegas MD at [address] [place] [address] 5/14/2019    PLACEMENT OF  PERMANENT SPINAL CORD STIMULATOR---XRAY, KODAK TABLE, BOSTON SCIENTIFIC performed by Trisha Vanegas MD at [address] [place] [address] N/A 1/9/2020    REVISION OF SPINAL CORD STIMULATOR  BATTERY AND POCKET performed by Trisha Vanegas MD at [address] N/A 8/28/2019    EGD BIOPSY performed by Richard Lynch MD at [address]:    07/14/21 0927   BP: 128/78   Temp: 99 °F (37.2 °C)   TempSrc: Oral   Weight: 147 lb (66.7 kg)       Objective:    Physical Exam  Vitals reviewed. Constitutional:       Appearance: She is well-developed. HENT:      Head: Normocephalic. Eyes:      Pupils: Pupils are equal, round, and reactive to light. Cardiovascular:      Rate and Rhythm: Normal rate and regular rhythm. Pulmonary:      Effort: Pulmonary effort is normal.      Breath sounds: Normal breath sounds. Abdominal:      Palpations: Abdomen is soft. Musculoskeletal:      Cervical back: Normal range of motion. Comments: Marked  Dec rom   flexeion   Skin:     General: Skin is warm. Neurological:      Mental Status: She is alert and oriented to person, place, and time.    Psychiatric: Behavior: Behavior normal.         Jordy García was seen today for hypertension. Diagnoses and all orders for this visit:    Mixed hyperlipidemia    Essential hypertension    Primary osteoarthritis involving multiple joints    Chronic bilateral low back pain with right-sided sciatica        Comments: [facility] pain doc   Call if sx    Ck bp  Home     rom exer taugh tfor back       Check blood pressure at home twice a day. Low-salt low caffeine diet. Call if systolic blood pressure is above 787 and diastolic blood pressures above 85. Only use a upper arm digital cuff. Aggressive low-fat diet. Avoid red meats, greasy fried foods, dairy products. Avoid processed foods. Take cholesterol medications without food. A great deal of time spent reviewing medications, diet, exercise, social issues. Also reviewing the chart before entering the room with patient and finishing charting after leaving patient's room. More than half of that time was spent face to face with the patient in counseling and coordinating care.       Follow Up: Return in about 4 months (around 11/14/2021) for remind to fast.     Seen by:  Padmini Lobato, DO

## 2021-07-15 ENCOUNTER — TELEPHONE (OUTPATIENT)
Dept: PRIMARY CARE CLINIC | Age: 81
End: 2021-07-15

## 2021-08-03 ENCOUNTER — TELEPHONE (OUTPATIENT)
Dept: NEUROSURGERY | Age: 81
End: 2021-08-03

## 2021-08-03 NOTE — PROGRESS NOTES
[facility]  [address]  [facility]    Follow up Note      Dwaine Navarro     Date of Visit:  2021    CC:  Patient presents for follow up   Chief Complaint   Patient presents with    Follow-up    Back Pain    Other     buttocks and down legs to foot mostly right       HPI:    Pain is unchanged to lower back. C/o coccyx and b/l buttock pain. States that she is not getting coverage to her right foot. Appropriate analgesia with current medications regimen: Yes. Change in quality of symptoms: no   Medication side effects:none. Recent diagnostic testing:none. Recent interventional procedures:none. She has been on anticoagulation medications to include Plavix. The patient  has not been on herbal supplements. The patient is not diabetic. Imagin2021 Lumbar Spine X-ray    FINDINGS:   Dextroscoliosis of the thoracic spine.  Redemonstration of posterior fusion   hardware extending from lower thoracic spine to level of S1.  There are   multiple pedicle screws and 2 rods.  Posterior fusion hardware appears   intact.  Multilevel laminectomy at level of the lumbar spine. Redemonstration of retrolisthesis of L1 on L2 of approximately 1.3 cm   appearing similar since prior.  Spinal stimulator leads are present with   distal aspect of leads at level of level of T8.           Impression   1.  Stable alignment status multilevel fusion with hardware extending from   lower thoracic spine to level of S1 with multiple pedicle screws and two   rods.  Hardware appears intact.       2.  Multilevel laminectomy at level of lumbar spine.       3.  Stable retrolisthesis of L1 on L2 of approximately 1.3 cm.       4.  No evidence of acute fracture.  CT follow-up may be helpful for further   evaluation given generalized osteopenia.      2021 Thoracic Spine X-ray    FINDINGS:   Dextroscoliosis of the thoracic spine.  Redemonstration of posterior fusion   hardware extending from lower thoracic spine to level of S1.  There are   multiple pedicle screws and 2 rods.  Posterior fusion hardware appears   intact.  Multilevel laminectomy at level of the lumbar spine. Redemonstration of retrolisthesis of L1 on L2 of approximately 1.3 cm   appearing similar since prior.  Spinal stimulator leads are present with   distal aspect of leads at level of level of T8.           Impression   1.  Stable alignment status multilevel fusion with hardware extending from   lower thoracic spine to level of S1 with multiple pedicle screws and two   rods.  Hardware appears intact.       2.  Multilevel laminectomy at level of lumbar spine.       3.  Stable retrolisthesis of L1 on L2 of approximately 1.3 cm.       4.  No evidence of acute fracture.  CT follow-up may be helpful for further   evaluation given generalized osteopenia.              Lumbar F/E films 2018 -   Posterior pedicle screw plates extend from G13 vertebral level to the   sacrum. Bony demineralization. Previous discectomy with loss of disc   space L1-2. Retrolisthesis L1-2 and anterolisthesis L4-5 S1. Multilevel disc space narrowing. No acute osseous finding. No   instability on flexion-extension views.      Lumbar MRI 2018 -   Posterior pedicle screw plates extend from X86 vertebral level to the   sacrum. Bony demineralization. Previous discectomy with loss of disc   space L1-2. Retrolisthesis L1-2 and anterolisthesis L4-5 S1. Multilevel disc space narrowing. No acute osseous finding.  No   instability on flexion-extension views.      Lumbar MRI 2016 -                   Potential Aberrant Drug-Related Behavior: no      Urine Drug Screenin2019 + hydrocodone and tramadol  2019 consistent  10/2019 consistent for hydrocodone and metabolite  2020 Consistent  2021 Consistent     OARRS report:  2019 consistent to 2019 consistent (post op medication s/p surgery)  2020 consistent to 08/2021 consistent     Opioid Agreement:  10/08/2020    Past Medical History:   Diagnosis Date    Anxiety     CVA [facility]cerebral vascular accident) ([facility] [address]     L FACIAL SENSATION    Depression     Diverticulitis     Hyperlipidemia     Hypertension     OA (osteoarthritis) of hip     OP (osteoporosis)     Shingles     R ABD    TIA (transient ischemic attack)     Tinnitus     Wears partial dentures     upper       Past Surgical History:   Procedure Laterality Date    BACK SURGERY      x3    CHOLECYSTECTOMY      COLONOSCOPY N/A 8/28/2019    COLONOSCOPY WITH BIOPSY performed by Adilson Phillips MD at [place] [address]  8/28/2019    COLONOSCOPY POLYPECTOMY SNARE/COLD BIOPSY performed by Adilson Phillips MD at [place] [address].      kuldip lense implant    HERNIA REPAIR  2018    HYSTERECTOMY      LUMBAR LAMINECTOMY      SPINAL CORD STIMULATOR IMPLANTATION N/A 5/6/2019    T8 SPINAL CORD STIMULATOR  TRIAL PLACEMENT--SUNITHA, KODAK TABLE, BOSTON SCIENTIFIC performed by Luna Mace MD at [place] N/A 5/14/2019    PLACEMENT OF  PERMANENT SPINAL CORD STIMULATOR---XRAY, KODAK TABLE, BOSTON SCIENTIFIC performed by Luna Mace MD at [place] N/A 1/9/2020    REVISION OF SPINAL CORD STIMULATOR  BATTERY AND POCKET performed by Luna Mace MD at [facility]. N/A 8/28/2019    EGD BIOPSY performed by Adilson Phillips MD at [address] [place] [address]       Prior to Admission medications    Medication Sig Start Date End Date Taking? Authorizing Provider   HYDROcodone-acetaminophen (NORCO) 5-325 MG per tablet Take 1 tablet by mouth 2 times daily as needed for Pain for up to 30 days. Intended supply: 30 days 7/8/21 8/7/21 Yes SEBASTIÁN Lovell   gabapentin (NEURONTIN) 300 MG capsule Take 1 capsule by mouth 4 times daily for 360 days.  Takes 2 in am and 2 at [facility] 7/2/21 6/27/22 Yes Beula Degree, DO clopidogrel (PLAVIX) 75 MG tablet Take 1 tablet by mouth daily 7/2/21  Yes Uriel Colunga DO   citalopram (CELEXA) 10 MG tablet Take 1 tablet by mouth daily 7/2/21  Yes Uriel Colunga DO   Lidocaine (ZTLIDO) 1.8 % PTCH Apply to affected area 12 hours on 12 hours off 5/6/21  Yes SEBASTIÁN Casanova   losartan (COZAAR) 50 MG tablet Take 1 tablet by mouth 2 times daily 4/27/21  Yes Uriel Colunga DO   memantine (NAMENDA) 10 MG tablet Take 1 tablet by mouth 2 times daily For memory 4/27/21  Yes Uriel Colunga DO   simvastatin (ZOCOR) 10 MG tablet Take 1 tablet by mouth daily 12/10/20  Yes Uriel Colunga DO   carvedilol (COREG) 12.5 MG tablet Take 1 tablet by mouth 2 times daily (with meals) 12/10/20  Yes Uriel Colunga DO   tiZANidine (ZANAFLEX) 4 MG tablet Take 1 tablet by mouth 3 times daily 6/18/20  Yes Uriel Colunga DO   hydrochlorothiazide (HYDRODIURIL) 25 MG tablet Take 1 tablet by mouth daily 10/28/19  Yes Uriel Colunga DO   dicyclomine (BENTYL) 10 MG capsule Take 1 capsule by mouth 4 times daily 8/13/19  Yes Kimmy Aguila MD   pantoprazole (PROTONIX) 40 MG tablet TAKE 1 TABLET BY MOUTH ONCE DAILY ( TAKE B-12 1000MG AND D3 2000MG FOR GERD) 5/19/19  Yes Historical Provider, MD   vitamin B-12 (CYANOCOBALAMIN) 1000 MCG tablet Take 1,000 mcg by mouth daily   Yes Historical Provider, MD   vitamin D (D3-1000) 1000 units CAPS Take by mouth daily   Yes Historical Provider, MD       Allergies   Allergen Reactions    Pcn [Penicillins] Rash       Social History     Socioeconomic History    Marital status: Single     Spouse name: Not on file    Number of children: Not on file    Years of education: Not on file    Highest education level: Not on file   Occupational History    Not on file   Tobacco Use    Smoking status: Never Smoker    Smokeless tobacco: Never Used   Vaping Use    Vaping Use: Never used   Substance and Sexual Activity    Alcohol use: Yes     Comment: rare    Drug use: No    Sexual activity: Not Currently   Other Topics Concern    Not on file   Social History Narrative        CATARACTS    TINNITUS    GB OUT     C HYSTER    MOTHER  AT 43 FROM SUICIDE    FH CVA---F  52 GF  FROM CVA AT 46    FH HTN BRO    CATH NEG     OP    TICS    BOYFRIEND NHAN Hyatt  1940 Page #2    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 BRADLEY---CAVALEIR X RAY    CH--5   Girl struthers   Two boys kinsman    Two out of town    [facility] [address]    CVA AFFECT L FACIAL SENSATION     [facility] OR ROB-----    COLON -10----NEG BUT PREVIOUS TICS AND POLYP    TIA     SHINGLES R ABD     MRI  WITH L-2-3 DISC AND NERVE----DR RIVAS----OR SET UP    ADMIT  WITH TIA----DR Tabitha Matute DC ASA AND ADDED PLAVIX TO THE PERSANTINE    LUMBAR CHERYL OR DR RIVAS 10-12------------AGAIN DONE SEAMUS RIVAS 16    MOTHER  SUICIDE AGE 43    DAD  AT 52 CVA    FIRST HUS SUICIDE AT 46    X RAY 11=13 WITH NEW L-1 -2 NARROWING AND RETROLISTHESIS----SHOTS WITH DR Sorensen Fix    ANX--DEP 1-14    OA HIPS DR CHARLTON---    DIET DM 4=16    LUMBAR DISC SURGERY  DR RIVAS    LEFT INGUINAL HERNIA WITH MESH DR BUNCH     ATHEROSCLEROIS EVLAURA BROTHRES     EVAL DR ALFONSO NEWMAN--- ADIVSED ENT---PT SAYS HE TOLD HER NOT GET NEBULZIER---AND    REFUSESD FOLLOW UP    T-8 SPINAL CORD STIMULATOR TRIAL 19--------implanted permanent dr Carmen Baez  ---device moved on buttock   -20    PAIN MEDS WITH DR Irish Kraus PAIN [facility]    Son with  Lung ca and brain mets  ----- Kurt Burnette for dementia       Pain management----------------pain doc monthly     Social Determinants of Health     Financial Resource Strain:     Difficulty of Paying Living Expenses:    Food Insecurity:     Worried About Running Out of Food in the Last Year:     [address] in the Last Year:    Transportation Needs:     Lack of Transportation (Medical):      Lack of Transportation (Non-Medical):    Physical Activity:     Days of Exercise per Week:     Minutes of Exercise per Session:    Stress:     Feeling of Stress :    Social Connections:     Frequency of Communication with Friends and Family:     Frequency of Social Gatherings with Friends and Family:     Attends Anabaptism Services:     Active Member of Clubs or Organizations:     Attends Club or Organization Meetings:     Marital Status:    Intimate Partner Violence:     Fear of Current or Ex-Partner:     Emotionally Abused:     Physically Abused:     Sexually Abused:        Family History   Problem Relation Age of Onset    Stroke Father        REVIEW OF SYSTEMS:     Lavella Goodell denies fever/chills, chest pain, shortness of breath, new bowel or bladder complaints. All other review of systems was negative. PHYSICAL EXAMINATION:      /78   Pulse 71   Temp 98 °F (36.7 °C) (Infrared)   Resp 16   Ht 5' 3.5\" (1.613 m)   Wt 148 lb (67.1 kg)   BMI 25.81 kg/m²     General:      General appearance:   pleasant and well-hydrated. , in no discomfort and A & O x3  Build:Normal Weight  Function:Rises from a seated position with difficulty    HEENT:    Head:normocephalic and atraumatic  Pupils:regular, round and equal.  Sclera: icterus absent,    Lungs:    Breathing:Normal expansion. No wheezing. Abdomen:    Shape:non-distended and normal    Lumbar spine:    Range of motion:abnormal mildly Lateral bending, flexion, extension rotation bilateral and is somewhat painful. Extremities:    Tremors:None bilaterally upper and lower  Range of motion:Generally normal shoulders, pain with internal rotation of hips not done.   Intact:Yes  Varicose veins:absent   Cyanosis:none  Edema:Normal    Neurological:    Motor:   [address]    Dermatology:    Skin:no unusual rashes, no skin lesions, no palpable subcutaneous nodules and good skin turgor    Impression:    LBP and RLE pain in L5 distribution with diffuse weakness of the LLE  Multiple prior lumbar fusion surgeries with Dr. Kathleen Naidu, most recent 9/2016 extending her fusion from T10-sacrum  On PLAVIX and ASA for Hx TIA's/CVA's  Has failed multiple surgeries, injections, PT, and medications  Had staged trial with Dr. Diane Maza 5/6/2019 and 5/14/2019, has had some improvement in her chronic pain thus far but does need a reprogramming, prior reprogramming has been helpful until she seems to lose the stimulation in the needed areas.                  Patient is s/p:  OPERATIONS PERFORMED:               1.  Exploration of left gluteal pocket for spinal cord stimulator                implantable pulse generator.                2.  Revision of the pocket.    3.  Replacement of implantable pulse generator and new pocket     on  01/09/2020.      OARRS reviewed 08/2021  Right foot - call rep regarding coverage. Continue Norco 5/325 BID #60, plan to wean off if able but for now not able to wean any further.    Continue gabapentin as currently prescribed from outside provider, TID  Continue off tizanidine if able, can take prn if necessary.  Has refills.    Compounding pain cream - ineffective  Aquatic PT - on hold at this time. Patient encouraged to stay active  Treatment plan discussed with the patient including medication side    effects. Controlled Substance Monitoring:    Acute and Chronic Pain Monitoring:   RX Monitoring 8/5/2021   Attestation -   Periodic Controlled Substance Monitoring Possible medication side effects, risk of tolerance/dependence & alternative treatments discussed. ;No signs of potential drug abuse or diversion identified. ;Assessed functional status. ;Obtaining appropriate analgesic effect of treatment.                        We discussed with the patient that combining opioids, benzodiazepines, alcohol, illicit drugs or sleep aids increases the risk of respiratory depression including death.  We discussed that these medications may cause drowsiness, sedation or dizziness and have counseled the patient not to drive or operate machinery. We have discussed that these medications will be prescribed only by one provider. We have discussed with the patient about age related risk factors and have thoroughly discussed the importance of taking these medications as prescribed. The patient verbalizes understanding.

## 2021-08-03 NOTE — TELEPHONE ENCOUNTER
Patient having increased numbness in her foot and pain across her tailbone area. Wants to know if she should schedule with Dr. Geovanna Allison.  [phone]

## 2021-08-05 ENCOUNTER — OFFICE VISIT (OUTPATIENT)
Dept: PAIN MANAGEMENT | Age: 81
End: 2021-08-05
Payer: MEDICARE

## 2021-08-05 VITALS
BODY MASS INDEX: 25.27 KG/M2 | WEIGHT: 148 LBS | HEIGHT: 64 IN | HEART RATE: 71 BPM | DIASTOLIC BLOOD PRESSURE: 78 MMHG | SYSTOLIC BLOOD PRESSURE: 122 MMHG | TEMPERATURE: 98 F | RESPIRATION RATE: 16 BRPM

## 2021-08-05 DIAGNOSIS — G89.4 CHRONIC PAIN SYNDROME: Chronic | ICD-10-CM

## 2021-08-05 DIAGNOSIS — G89.29 CHRONIC BILATERAL LOW BACK PAIN WITH RIGHT-SIDED SCIATICA: Chronic | ICD-10-CM

## 2021-08-05 DIAGNOSIS — M96.1 LUMBAR POST-LAMINECTOMY SYNDROME: ICD-10-CM

## 2021-08-05 DIAGNOSIS — M54.16 LUMBAR RADICULOPATHY: Primary | ICD-10-CM

## 2021-08-05 DIAGNOSIS — F45.1 SOMATIC SYMPTOM DISORDER, PERSISTENT, SEVERE, WITH PREDOMINANT PAIN: ICD-10-CM

## 2021-08-05 DIAGNOSIS — M54.41 CHRONIC BILATERAL LOW BACK PAIN WITH RIGHT-SIDED SCIATICA: Chronic | ICD-10-CM

## 2021-08-05 PROCEDURE — 99213 OFFICE O/P EST LOW 20 MIN: CPT | Performed by: PHYSICIAN ASSISTANT

## 2021-08-05 RX ORDER — HYDROCODONE BITARTRATE AND ACETAMINOPHEN 5; 325 MG/1; MG/1
1 TABLET ORAL 2 TIMES DAILY PRN
Qty: 60 TABLET | Refills: 0 | Status: SHIPPED
Start: 2021-08-07 | End: 2021-11-18 | Stop reason: SDUPTHER

## 2021-08-24 ENCOUNTER — HOSPITAL ENCOUNTER (EMERGENCY)
Age: 81
Discharge: HOME | End: 2021-08-24
Payer: MEDICARE

## 2021-08-24 ENCOUNTER — NURSE TRIAGE (OUTPATIENT)
Dept: OTHER | Facility: CLINIC | Age: 81
End: 2021-08-24

## 2021-08-24 ENCOUNTER — APPOINTMENT (OUTPATIENT)
Dept: CT IMAGING | Age: 81
End: 2021-08-24
Payer: MEDICARE

## 2021-08-24 VITALS
BODY MASS INDEX: 25.27 KG/M2 | TEMPERATURE: 97.1 F | OXYGEN SATURATION: 99 % | HEART RATE: 71 BPM | WEIGHT: 148 LBS | HEIGHT: 64 IN | RESPIRATION RATE: 20 BRPM | SYSTOLIC BLOOD PRESSURE: 177 MMHG | DIASTOLIC BLOOD PRESSURE: 102 MMHG

## 2021-08-24 DIAGNOSIS — S76.212A INGUINAL STRAIN, LEFT, INITIAL ENCOUNTER: Primary | ICD-10-CM

## 2021-08-24 LAB
ANION GAP SERPL CALCULATED.3IONS-SCNC: 9 MMOL/L (ref 7–16)
BASOPHILS ABSOLUTE: 0.05 E9/L (ref 0–0.2)
BASOPHILS RELATIVE PERCENT: 0.7 % (ref 0–2)
BUN BLDV-MCNC: 10 MG/DL (ref 6–23)
CALCIUM SERPL-MCNC: 9.6 MG/DL (ref 8.6–10.2)
CHLORIDE BLD-SCNC: 102 MMOL/L (ref 98–107)
CO2: 28 MMOL/L (ref 22–29)
CREAT SERPL-MCNC: 0.7 MG/DL (ref 0.5–1)
EOSINOPHILS ABSOLUTE: 0.1 E9/L (ref 0.05–0.5)
EOSINOPHILS RELATIVE PERCENT: 1.4 % (ref 0–6)
GFR AFRICAN AMERICAN: >60
GFR NON-AFRICAN AMERICAN: >60 ML/MIN/1.73
GLUCOSE BLD-MCNC: 102 MG/DL (ref 74–99)
HCT VFR BLD CALC: 37.2 % (ref 34–48)
HEMOGLOBIN: 12.3 G/DL (ref 11.5–15.5)
IMMATURE GRANULOCYTES #: 0.03 E9/L
IMMATURE GRANULOCYTES %: 0.4 % (ref 0–5)
LYMPHOCYTES ABSOLUTE: 2.05 E9/L (ref 1.5–4)
LYMPHOCYTES RELATIVE PERCENT: 28.6 % (ref 20–42)
MCH RBC QN AUTO: 31.6 PG (ref 26–35)
MCHC RBC AUTO-ENTMCNC: 33.1 % (ref 32–34.5)
MCV RBC AUTO: 95.6 FL (ref 80–99.9)
MONOCYTES ABSOLUTE: 0.65 E9/L (ref 0.1–0.95)
MONOCYTES RELATIVE PERCENT: 9.1 % (ref 2–12)
NEUTROPHILS ABSOLUTE: 4.28 E9/L (ref 1.8–7.3)
NEUTROPHILS RELATIVE PERCENT: 59.8 % (ref 43–80)
PDW BLD-RTO: 11.9 FL (ref 11.5–15)
PLATELET # BLD: 314 E9/L (ref 130–450)
PMV BLD AUTO: 9.5 FL (ref 7–12)
POTASSIUM SERPL-SCNC: 4.4 MMOL/L (ref 3.5–5)
RBC # BLD: 3.89 E12/L (ref 3.5–5.5)
SODIUM BLD-SCNC: 139 MMOL/L (ref 132–146)
WBC # BLD: 7.2 E9/L (ref 4.5–11.5)

## 2021-08-24 PROCEDURE — 80048 BASIC METABOLIC PNL TOTAL CA: CPT

## 2021-08-24 PROCEDURE — 74177 CT ABD & PELVIS W/CONTRAST: CPT

## 2021-08-24 PROCEDURE — 99285 EMERGENCY DEPT VISIT HI MDM: CPT

## 2021-08-24 PROCEDURE — 6360000004 HC RX CONTRAST MEDICATION: Performed by: RADIOLOGY

## 2021-08-24 PROCEDURE — 2580000003 HC RX 258: Performed by: RADIOLOGY

## 2021-08-24 PROCEDURE — 85025 COMPLETE CBC W/AUTO DIFF WBC: CPT

## 2021-08-24 RX ORDER — SODIUM CHLORIDE 0.9 % (FLUSH) 0.9 %
10 SYRINGE (ML) INJECTION PRN
Status: DISCONTINUED | OUTPATIENT
Start: 2021-08-24 | End: 2021-08-24 | Stop reason: HOSPADM

## 2021-08-24 RX ORDER — SODIUM CHLORIDE 0.9 % (FLUSH) 0.9 %
10 SYRINGE (ML) INJECTION ONCE
Status: COMPLETED | OUTPATIENT
Start: 2021-08-24 | End: 2021-08-24

## 2021-08-24 RX ADMIN — SODIUM CHLORIDE, PRESERVATIVE FREE 10 ML: 5 INJECTION INTRAVENOUS at 16:16

## 2021-08-24 RX ADMIN — IOPAMIDOL 90 ML: 755 INJECTION, SOLUTION INTRAVENOUS at 16:16

## 2021-08-24 ASSESSMENT — PAIN DESCRIPTION - PAIN TYPE: TYPE: ACUTE PAIN

## 2021-08-24 ASSESSMENT — PAIN DESCRIPTION - ORIENTATION: ORIENTATION: LEFT

## 2021-08-24 ASSESSMENT — PAIN DESCRIPTION - LOCATION: LOCATION: GROIN

## 2021-08-24 ASSESSMENT — PAIN SCALES - GENERAL: PAINLEVEL_OUTOF10: 8

## 2021-08-24 NOTE — TELEPHONE ENCOUNTER
Reason for Disposition   Unable to walk    Answer Assessment - Initial Assessment Questions  1. ONSET: \"When did the pain start? \"       Last night    2. LOCATION: \"Where is the pain located? \"       Left leg in groin area    3. PAIN: \"How bad is the pain? \"    (Scale 1-10; or mild, moderate, severe)    -  MILD (1-3): doesn't interfere with normal activities     -  MODERATE (4-7): interferes with normal activities (e.g., work or school) or awakens from sleep, limping     -  SEVERE (8-10): excruciating pain, unable to do any normal activities, unable to walk      Can't bear weight, 8/10    4. WORK OR EXERCISE: \"Has there been any recent work or exercise that involved this part of the body? \"       No injury, none    5. CAUSE: \"What do you think is causing the leg pain? \"      Unknown    6. OTHER SYMPTOMS: \"Do you have any other symptoms? \" (e.g., chest pain, back pain, breathing difficulty, swelling, rash, fever, numbness, weakness)      Back pain is chronic, tingling    7. PREGNANCY: \"Is there any chance you are pregnant? \" \"When was your last menstrual period? \"      N/a    Protocols used: LEG PAIN-ADULT-OH    Received call from Miracle Villegas at [facility] with [employer] Complaint. Brief description of triage: leg pain in left leg, hurts in left groin area, cannot bear any weight or walk    Triage indicates for patient to go to ED, will go to [address] [place] [address]. Care advice provided, patient verbalizes understanding; denies any other questions or concerns; instructed to call back for any new or worsening symptoms. Attention Provider: Thank you for allowing me to participate in the care of your patient. The patient was connected to triage in response to information provided to the [facility]. Please do not respond through this encounter as the response is not directed to a shared pool.

## 2021-08-24 NOTE — ED PROVIDER NOTES
[address]  Department of Emergency Medicine   ED  Encounter Note  Admit Date/RoomTime: 2021 12:50 PM  ED Room: [facility]    NAME: Bruce Tran  : 1940  MRN: [id number]     Chief Complaint:  Groin Pain (left sided groin pain, states cant walk on left leg)    History of Present Illness       Bruce Tran is a 80 y.o. old female who presents to the emergency department by private vehicle, for waxing and waning episodes sharp pain, left inguinal without radiation which began several year(s) prior to arrival.   Past 1 or 2 years she has felt a lump intermittently along her left inguinal canal however over the past 7 days she has developed severe pain in her left inguinal region made worse with any attempt to stand, walk or flex her hip. It is somewhat relieved with lying still or lying flat. At one point she was told the lump that she experienced in her inguinal region was not a hernia. She is denying any urinary symptoms. No change in bowel habits. No fever, chills or back pain. .      ROS   Pertinent positives and negatives are stated within HPI, all other systems reviewed and are negative. Past Medical History:  has a past medical history of Anxiety, CVA (cerebral vascular accident) ([address] Depression, Diverticulitis, Hyperlipidemia, Hypertension, OA (osteoarthritis) of hip, OP (osteoporosis), Shingles, TIA (transient ischemic attack), Tinnitus, and Wears partial dentures. Surgical History has a past surgical history that includes back surgery; Cholecystectomy; Hysterectomy; hernia repair (2018); spinal cord stimulator implantation (N/A, 2019); spinal cord stimulator implantation (N/A, 2019); Colonoscopy (N/A, 2019); Upper gastrointestinal endoscopy (N/A, 2019); Colonoscopy (2019); lumbar laminectomy; eye surgery; and spinal cord stimulator implantation (N/A, 2020). Social History:  reports that she has never smoked. She has never used smokeless tobacco. She reports current alcohol use. She reports that she does not use drugs. Family History: family history includes Stroke in her father. Allergies: Pcn [penicillins]    Physical Exam   Oxygen Saturation Interpretation: Normal on room air analysis. ED Triage Vitals [08/24/21 1205]   BP Temp Temp src Pulse Resp SpO2 Height Weight   (!) 177/102 97.1 °F (36.2 °C) -- 71 20 99 % 5' 3.5\" (1.613 m) 148 lb (67.1 kg)          General Appearance/Constitutional:  Alert, development consistent with age. HEENT:  NC/NT. PERRLA. Airway patent. Neck:  Supple. No lymphadenopathy. Respiratory:  No retractions. Lungs Clear to auscultation and breath sounds equal.  CV:  Regular rate and rhythm. GI:  normal appearing, non-distended with no visible hernias. Bowel sounds: normal bowel sounds. Tenderness: There is no palpable hernia. There are no firm or pulsatile areas along the right or left inguinal canal.  There is mild tenderness along the inguinal ligament on the left which does reproduce the pain especially with activation of the quadriceps muscle. .        Liver: non-tender. Spleen:  non-tender. Back: CVA Tenderness: No CVA tenderness. : /Pelvic examination deferred / declined. Integument:  Normal turgor. Warm, dry, without visible rash, unless noted elsewhere. Lymphatics: No edema, cap.refill <3sec. Neurological:  Orientation age-appropriate. Motor functions intact.     Lab / Imaging Results   (All laboratory and radiology results have been personally reviewed by myself)  Labs:  Results for orders placed or performed during the hospital encounter of 08/24/21   CBC Auto Differential   Result Value Ref Range    WBC 7.2 4.5 - 11.5 E9/L    RBC 3.89 3.50 - 5.50 E12/L    Hemoglobin 12.3 11.5 - 15.5 g/dL    Hematocrit 37.2 34.0 - 48.0 %    MCV 95.6 80.0 - 99.9 fL    MCH 31.6 26.0 - 35.0 pg    MCHC 33.1 32.0 - 34.5 %    RDW 11.9 11.5 - 15.0 fL    Platelets 330 530 - 357 E9/L    MPV 9.5 7.0 - 12.0 fL    Neutrophils % 59.8 43.0 - 80.0 %    Immature Granulocytes % 0.4 0.0 - 5.0 %    Lymphocytes % 28.6 20.0 - 42.0 %    Monocytes % 9.1 2.0 - 12.0 %    Eosinophils % 1.4 0.0 - 6.0 %    Basophils % 0.7 0.0 - 2.0 %    Neutrophils Absolute 4.28 1.80 - 7.30 E9/L    Immature Granulocytes # 0.03 E9/L    Lymphocytes Absolute 2.05 1.50 - 4.00 E9/L    Monocytes Absolute 0.65 0.10 - 0.95 E9/L    Eosinophils Absolute 0.10 0.05 - 0.50 E9/L    Basophils Absolute 0.05 0.00 - 0.20 I7/B   Basic Metabolic Panel   Result Value Ref Range    Sodium 139 132 - 146 mmol/L    Potassium 4.4 3.5 - 5.0 mmol/L    Chloride 102 98 - 107 mmol/L    CO2 28 22 - 29 mmol/L    Anion Gap 9 7 - 16 mmol/L    Glucose 102 (H) 74 - 99 mg/dL    BUN 10 6 - 23 mg/dL    CREATININE 0.7 0.5 - 1.0 mg/dL    GFR Non-African American >60 >=60 mL/min/1.73    GFR African American >60     Calcium 9.6 8.6 - 10.2 mg/dL     Imaging: All Radiology results interpreted by Radiologist unless otherwise noted. CT ABDOMEN PELVIS W IV CONTRAST Additional Contrast? None   Final Result   1. No acute abnormality is seen in the abdomen or the pelvis. 2. Severe distal colonic diverticulosis without diverticulitis. 3. Small hiatal hernia. 4. Postoperative changes in the lower thoracic and lumbar spine, as described. RECOMMENDATIONS:   5.0 mm solid pulmonary nodule detected on incomplete chest CT. No routine follow-up imaging is recommended. These guidelines do not apply to immunocompromised patients and patients with   cancer. Follow up in patients with significant comorbidities as clinically   warranted. For lung cancer screening, adhere to Lung-RADS guidelines. Reference: Radiology. 2017; 284(1):228-43.            ED Course / Medical Decision Making     Medications   sodium chloride flush 0.9 % injection 10 mL (has no administration in time range)   sodium chloride flush 0.9 % injection 10 mL (10 mLs IntraVENous Given 8/24/21 1616)   iopamidol (ISOVUE-370) 76 % injection 90 mL (90 mLs IntraVENous Given 8/24/21 1616)          Re-Evaluations:  8/24/21      Time: 9189    Patients condition remains stable. Consultations:             None    Procedures:   none    MDM: Patient presented with left inguinal pain made worse with certain movements and lifting her left leg. Physical findings were not all that impressive except for tenderness along the inguinal ligament. CT scan of the abdomen pelvis demonstrated no acute intra-abdominal pathology other than an incidental finding of a hiatal hernia. She does have pain medication at home for her back she has muscle relaxants at home for her back she also has a cane and a walker to use to help assisting in ambulation. She was reassured that her pain is likely musculoskeletal from some type of sprain or strain likely of the groin. Time, rest and pain medication are with needed. She is to follow with her primary care doctor. Plan of Care/Counseling:  Clemente Fields reviewed today's visit with the patient and spouse / life partner in addition to providing specific details for the plan of care and counseling regarding the diagnosis and prognosis. Questions are answered at this time and are agreeable with the plan. Assessment      1. Inguinal strain, left, initial encounter      This patient's ED course included: a personal history and physicial examination  This patient has remained hemodynamically stable during their ED course. Plan   Discharged home  Patient condition is good. New Medications     New Prescriptions    No medications on file     Electronically signed by SEBASTIÁN Fields   DD: 8/24/21  **This report was transcribed using voice recognition software. Every effort was made to ensure accuracy; however, inadvertent computerized transcription errors may be present.   END OF PROVIDER NOTE       Clemente Mensah  08/24/21 5589

## 2021-08-30 ENCOUNTER — TELEPHONE (OUTPATIENT)
Dept: PRIMARY CARE CLINIC | Age: 81
End: 2021-08-30

## 2021-08-30 DIAGNOSIS — M15.9 PRIMARY OSTEOARTHRITIS INVOLVING MULTIPLE JOINTS: ICD-10-CM

## 2021-08-30 DIAGNOSIS — G89.29 CHRONIC BILATERAL LOW BACK PAIN WITH RIGHT-SIDED SCIATICA: Primary | ICD-10-CM

## 2021-08-30 DIAGNOSIS — M54.41 CHRONIC BILATERAL LOW BACK PAIN WITH RIGHT-SIDED SCIATICA: Primary | ICD-10-CM

## 2021-08-30 DIAGNOSIS — M96.1 LUMBAR POST-LAMINECTOMY SYNDROME: ICD-10-CM

## 2021-08-30 DIAGNOSIS — G89.4 CHRONIC PAIN SYNDROME: ICD-10-CM

## 2021-08-30 NOTE — TELEPHONE ENCOUNTER
The pt's  is calling to see if the pt can get an order for a bedside commode faxed over to [facility] in [place] at [phone]

## 2021-08-31 ENCOUNTER — TELEPHONE (OUTPATIENT)
Dept: PRIMARY CARE CLINIC | Age: 81
End: 2021-08-31

## 2021-08-31 NOTE — TELEPHONE ENCOUNTER
Per Dr. Eloisa Zhong, Shaji @ [facility] notified ok to wait week and a half or so to start physical therapy

## 2021-08-31 NOTE — TELEPHONE ENCOUNTER
Monty @ [facility] notified vitals with physical therapy enough per Dr. Taylor Camacho

## 2021-09-01 ENCOUNTER — TELEPHONE (OUTPATIENT)
Dept: PRIMARY CARE CLINIC | Age: 81
End: 2021-09-01

## 2021-09-02 ENCOUNTER — TELEPHONE (OUTPATIENT)
Dept: PRIMARY CARE CLINIC | Age: 81
End: 2021-09-02

## 2021-09-02 NOTE — TELEPHONE ENCOUNTER
Orders never received.   Gave back fax # to fax so Dr. Ras Agrawal can sign and fax back so they can schedule pt

## 2021-09-10 DIAGNOSIS — E78.2 MIXED HYPERLIPIDEMIA: Chronic | ICD-10-CM

## 2021-09-10 RX ORDER — SIMVASTATIN 10 MG
10 TABLET ORAL DAILY
Qty: 90 TABLET | Refills: 3 | Status: SHIPPED
Start: 2021-09-10 | End: 2021-11-12 | Stop reason: SDUPTHER

## 2021-11-12 ENCOUNTER — OFFICE VISIT (OUTPATIENT)
Dept: PRIMARY CARE CLINIC | Age: 81
End: 2021-11-12
Payer: MEDICARE

## 2021-11-12 VITALS
HEIGHT: 64 IN | BODY MASS INDEX: 25.78 KG/M2 | DIASTOLIC BLOOD PRESSURE: 78 MMHG | TEMPERATURE: 98.3 F | SYSTOLIC BLOOD PRESSURE: 128 MMHG | WEIGHT: 151 LBS

## 2021-11-12 DIAGNOSIS — R41.3 MEMORY IMPAIRMENT: Primary | Chronic | ICD-10-CM

## 2021-11-12 DIAGNOSIS — I10 ESSENTIAL HYPERTENSION: Chronic | ICD-10-CM

## 2021-11-12 DIAGNOSIS — F41.9 ANXIETY: ICD-10-CM

## 2021-11-12 DIAGNOSIS — E78.2 MIXED HYPERLIPIDEMIA: Chronic | ICD-10-CM

## 2021-11-12 DIAGNOSIS — Z23 NEED FOR INFLUENZA VACCINATION: ICD-10-CM

## 2021-11-12 PROCEDURE — G0008 ADMIN INFLUENZA VIRUS VAC: HCPCS | Performed by: FAMILY MEDICINE

## 2021-11-12 PROCEDURE — 90694 VACC AIIV4 NO PRSRV 0.5ML IM: CPT | Performed by: FAMILY MEDICINE

## 2021-11-12 PROCEDURE — 99214 OFFICE O/P EST MOD 30 MIN: CPT | Performed by: FAMILY MEDICINE

## 2021-11-12 RX ORDER — HYDROCHLOROTHIAZIDE 25 MG/1
25 TABLET ORAL DAILY
Qty: 90 TABLET | Refills: 3 | Status: SHIPPED
Start: 2021-11-12 | End: 2022-11-02 | Stop reason: SDUPTHER

## 2021-11-12 RX ORDER — CARVEDILOL 12.5 MG/1
12.5 TABLET ORAL 2 TIMES DAILY WITH MEALS
Qty: 180 TABLET | Refills: 3 | Status: SHIPPED | OUTPATIENT
Start: 2021-11-12

## 2021-11-12 RX ORDER — SIMVASTATIN 10 MG
10 TABLET ORAL DAILY
Qty: 90 TABLET | Refills: 3 | Status: SHIPPED
Start: 2021-11-12 | End: 2022-08-18 | Stop reason: SDUPTHER

## 2021-11-12 RX ORDER — CITALOPRAM 10 MG/1
10 TABLET ORAL DAILY
Qty: 90 TABLET | Refills: 3 | Status: SHIPPED | OUTPATIENT
Start: 2021-11-12

## 2021-11-12 RX ORDER — CLOPIDOGREL BISULFATE 75 MG/1
75 TABLET ORAL DAILY
Qty: 90 TABLET | Refills: 3 | Status: SHIPPED | OUTPATIENT
Start: 2021-11-12

## 2021-11-12 RX ORDER — MEMANTINE HYDROCHLORIDE 10 MG/1
10 TABLET ORAL 2 TIMES DAILY
Qty: 180 TABLET | Refills: 5 | Status: SHIPPED | OUTPATIENT
Start: 2021-11-12

## 2021-11-12 RX ORDER — LOSARTAN POTASSIUM 50 MG/1
50 TABLET ORAL 2 TIMES DAILY
Qty: 180 TABLET | Refills: 5 | Status: SHIPPED | OUTPATIENT
Start: 2021-11-12

## 2021-11-12 RX ORDER — GABAPENTIN 300 MG/1
300 CAPSULE ORAL 4 TIMES DAILY
Qty: 360 CAPSULE | Refills: 3 | Status: SHIPPED
Start: 2021-11-12 | End: 2022-09-13 | Stop reason: SDUPTHER

## 2021-11-12 ASSESSMENT — ENCOUNTER SYMPTOMS
GASTROINTESTINAL NEGATIVE: 1
RESPIRATORY NEGATIVE: 1
BACK PAIN: 1
ALLERGIC/IMMUNOLOGIC NEGATIVE: 1
EYES NEGATIVE: 1

## 2021-11-12 ASSESSMENT — PATIENT HEALTH QUESTIONNAIRE - PHQ9
SUM OF ALL RESPONSES TO PHQ QUESTIONS 1-9: 0
SUM OF ALL RESPONSES TO PHQ QUESTIONS 1-9: 0
1. LITTLE INTEREST OR PLEASURE IN DOING THINGS: 0
2. FEELING DOWN, DEPRESSED OR HOPELESS: 0
SUM OF ALL RESPONSES TO PHQ QUESTIONS 1-9: 0
SUM OF ALL RESPONSES TO PHQ9 QUESTIONS 1 & 2: 0

## 2021-11-12 NOTE — PROGRESS NOTES
21  Name: Zena Deleon    : 1940    Sex: female    Age: 80 y.o. Subjective:  Chief Complaint: Patient is here for  4 mo ck  Re  Back pain  oa  Memory   Chol  gerd     Feel ok   No  Cp o r sob         Poor  sthort  Term memory     lwo back  falore off and on  No cp ro so b  Pt quit  Pain dopc        Jerome not help  Saw supa roland      Review of Systems   Constitutional: Negative. HENT: Negative. Eyes: Negative. Respiratory: Negative. Cardiovascular: Negative. Gastrointestinal: Negative. Endocrine: Negative. Genitourinary: Negative. Musculoskeletal: Positive for back pain. Skin: Negative. Allergic/Immunologic: Negative. Neurological: Negative. Poor short term memory   Hematological: Negative. Psychiatric/Behavioral: Negative. Current Outpatient Medications:     hydroCHLOROthiazide (HYDRODIURIL) 25 MG tablet, Take 1 tablet by mouth daily, Disp: 90 tablet, Rfl: 3    citalopram (CELEXA) 10 MG tablet, Take 1 tablet by mouth daily, Disp: 90 tablet, Rfl: 3    simvastatin (ZOCOR) 10 MG tablet, Take 1 tablet by mouth daily, Disp: 90 tablet, Rfl: 3    clopidogrel (PLAVIX) 75 MG tablet, Take 1 tablet by mouth daily, Disp: 90 tablet, Rfl: 3    gabapentin (NEURONTIN) 300 MG capsule, Take 1 capsule by mouth 4 times daily for 360 days.  Takes 2 in am and 2 at HS, Disp: 360 capsule, Rfl: 3    carvedilol (COREG) 12.5 MG tablet, Take 1 tablet by mouth 2 times daily (with meals), Disp: 180 tablet, Rfl: 3    memantine (NAMENDA) 10 MG tablet, Take 1 tablet by mouth 2 times daily For memory, Disp: 180 tablet, Rfl: 5    losartan (COZAAR) 50 MG tablet, Take 1 tablet by mouth 2 times daily, Disp: 180 tablet, Rfl: 5    dicyclomine (BENTYL) 10 MG capsule, Take 1 capsule by mouth 4 times daily, Disp: 120 capsule, Rfl: 3    pantoprazole (PROTONIX) 40 MG tablet, TAKE 1 TABLET BY MOUTH ONCE DAILY ( TAKE B-12 1000MG AND D3 2000MG FOR GERD), Disp: , Rfl: 12    vitamin B-12 (CYANOCOBALAMIN) 1000 MCG tablet, Take 1,000 mcg by mouth daily, Disp: , Rfl:     vitamin D (D3-1000) 1000 units CAPS, Take by mouth daily, Disp: , Rfl:   Allergies   Allergen Reactions    Pcn [Penicillins] Rash     Social History     Socioeconomic History    Marital status: Single     Spouse name: Not on file    Number of children: Not on file    Years of education: Not on file    Highest education level: Not on file   Occupational History    Not on file   Tobacco Use    Smoking status: Never Smoker    Smokeless tobacco: Never Used   Vaping Use    Vaping Use: Never used   Substance and Sexual Activity    Alcohol use: Yes     Comment: rare    Drug use: No    Sexual activity: Not Currently   Other Topics Concern    Not on file   Social History Narrative        CATARACTS    TINNITUS    GB OUT     C HYSTER    MOTHER  AT 42 FROM SUICIDE    FH CVA---F  52 GF  FROM CVA AT 46    FH HTN BRO    CATH NEG     OP    TICS    BOYFRIEND NHAN Miner Deborah  1940 Page #2    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 BRADLEY---CAVALEIR X RAY    CH--5   Girl struthers   Two boys kinsman    Two out of town    RETIRED [employer] THEN WORKS [employer]    CVA AFFECT L FACIAL SENSATION 2-08    TOMMY LUMBAR One Arch Aaron OR ROB-----1-09    COLON 1-10----NEG BUT PREVIOUS TICS AND POLYP    TIA 1-11    SHINGLES R ABD 2-12    MRI 12 WITH L-2-3 DISC AND NERVE----DR RIVAS----OR SET UP    ADMIT  WITH TIA----DR Maci Flaherty DC ASA AND ADDED PLAVIX TO THE PERSANTINE    LUMBAR CHERYL OR DR RIVAS 10-12------------AGAIN DONE D SANDRA RIVAS -16    MOTHER  SUICIDE AGE 43    DAD  AT 52 CVA    FIRST HUS SUICIDE AT 52    X RAY 11=13 WITH NEW L-1 -2 NARROWING AND RETROLISTHESIS----SHOTS WITH DR Sony Whittington    ANX--DEP 1-14    OA HIPS DR CHARLTON---    DIET DM 4=16    LUMBAR DISC SURGERY 9-16 DR RIVAS    LEFT INGUINAL HERNIA WITH MESH DR BUNCH     ATHEROSCLEROIS EVAL DR BROTHERS     EVAL DR ALFONSO NEWMAN--- ADIVSED ENT---PT SAYS HE TOLD HER NOT GET NEBULZIER---AND    REFUSESD FOLLOW UP    T-8 SPINAL CORD STIMULATOR TRIAL 19--------implanted permanent dr Debra Pina  ---device moved on buttock   -20    PAIN MEDS WITH DR Carmona Postal PAIN MEKA ABARCA [facility]    Son with  Lung ca and brain mets  ----- [place]--  at  62 yrs old    eval dr Nancy Dickson for dementia       Pain management----------------pain doc monthly     Social Determinants of Health     Financial Resource Strain:     Difficulty of Paying Living Expenses: Not on file   Food Insecurity:     Worried About Running Out of Food in the Last Year: Not on file    Anastasia of Food in the Last Year: Not on file   Transportation Needs:     Lack of Transportation (Medical): Not on file    Lack of Transportation (Non-Medical):  Not on file   Physical Activity:     Days of Exercise per Week: Not on file    Minutes of Exercise per Session: Not on file   Stress:     Feeling of Stress : Not on file   Social Connections:     Frequency of Communication with Friends and Family: Not on file    Frequency of Social Gatherings with Friends and Family: Not on file    Attends Anglican Services: Not on file    Active Member of [address] [place] [address] [employer] or Organizations: Not on file    Attends Club or Organization Meetings: Not on file    Marital Status: Not on file   Intimate Partner Violence:     Fear of Current or Ex-Partner: Not on file    Emotionally Abused: Not on file    Physically Abused: Not on file    Sexually Abused: Not on file   Housing Stability:     Unable to Pay for Housing in the Last Year: Not on file    Number of [place] in the Last Year: Not on file    Unstable Housing in the Last Year: Not on file      Past Medical History:   Diagnosis Date    Anxiety     CVA (cerebral vascular accident) ([facility] [address]     L FACIAL SENSATION    Depression     Diverticulitis     Hyperlipidemia     Hypertension     OA (osteoarthritis) of hip     OP (osteoporosis)     Shinella FLORES ABD    TIA (transient ischemic attack)     Tinnitus     Wears partial dentures     upper     Family History   Problem Relation Age of Onset    Stroke Father       Past Surgical History:   Procedure Laterality Date    BACK SURGERY      x3    CHOLECYSTECTOMY      COLONOSCOPY N/A 8/28/2019    COLONOSCOPY WITH BIOPSY performed by Elvia Valdes MD at [address] COLONOSCOPY  8/28/2019    COLONOSCOPY POLYPECTOMY SNARE/COLD BIOPSY performed by Elvia Valdes MD at [facility] [address]      kuldip lense implant    HERNIA REPAIR  2018    HYSTERECTOMY      LUMBAR LAMINECTOMY      SPINAL CORD STIMULATOR IMPLANTATION N/A 5/6/2019    T8 SPINAL CORD STIMULATOR  TRIAL PLACEMENT--SUNITHA, KODAK TABLE, BOSTON SCIENTIFIC performed by Lucio Burnette MD at [address] [place] [address] N/A 5/14/2019    PLACEMENT OF  PERMANENT SPINAL CORD STIMULATOR---XRAY, KODAK TABLE, BOSTON SCIENTIFIC performed by Lucio Burnette MD at [address] [place] [address] N/A 1/9/2020    REVISION OF SPINAL CORD STIMULATOR  BATTERY AND POCKET performed by Lucio Burnette MD at [address] N/A 8/28/2019    EGD BIOPSY performed by Elvia Valdes MD at [address]:    11/12/21 0914   BP: 128/78   Temp: 98.3 °F (36.8 °C)   TempSrc: Oral   Weight: 151 lb (68.5 kg)   Height: 5' 3.5\" (1.613 m)       Objective:    Physical Exam  Vitals reviewed. Constitutional:       Appearance: She is well-developed. HENT:      Head: Normocephalic. Eyes:      Pupils: Pupils are equal, round, and reactive to light. Cardiovascular:      Rate and Rhythm: Normal rate and regular rhythm. Pulmonary:      Effort: Pulmonary effort is normal.      Breath sounds: Normal breath sounds. Abdominal:      Palpations: Abdomen is soft. Musculoskeletal:      Cervical back: Normal range of motion. Comments: Para lubmr spasm   Skin:     General: Skin is warm.    Neurological:      Mental Status: She is alert and oriented to person, place, and time. Psychiatric:         Behavior: Behavior normal.         Dereje Waldron was seen today for hypertension. Diagnoses and all orders for this visit:    Memory impairment  -     memantine (NAMENDA) 10 MG tablet; Take 1 tablet by mouth 2 times daily For memory    Need for influenza vaccination  -     INFLUENZA, QUADV, ADJUVANTED, 65 YRS =, IM, PF, PREFILL SYR, 0.5ML (FLUAD)    Mixed hyperlipidemia  -     simvastatin (ZOCOR) 10 MG tablet; Take 1 tablet by mouth daily    Essential hypertension  -     carvedilol (COREG) 12.5 MG tablet; Take 1 tablet by mouth 2 times daily (with meals)  -     losartan (COZAAR) 50 MG tablet; Take 1 tablet by mouth 2 times daily    Anxiety    Other orders  -     hydroCHLOROthiazide (HYDRODIURIL) 25 MG tablet; Take 1 tablet by mouth daily  -     citalopram (CELEXA) 10 MG tablet; Take 1 tablet by mouth daily  -     clopidogrel (PLAVIX) 75 MG tablet; Take 1 tablet by mouth daily  -     gabapentin (NEURONTIN) 300 MG capsule; Take 1 capsule by mouth 4 times daily for 360 days. Takes 2 in am and 2 at [facility]        Comments: plan lab in  4 mo  Diet exer fu with pain doc and dr Jw Mace prn     Check blood pressure at home twice a day. Low-salt low caffeine diet. Call if systolic blood pressure is above 387 and diastolic blood pressures above 85. Only use a upper arm digital cuff. Aggressive low-fat diet. Avoid red meats, greasy fried foods, dairy products. Avoid processed foods. Take cholesterol medications without food. A great deal of time spent reviewing medications, diet, exercise, social issues. Also reviewing the chart before entering the room with patient and finishing charting after leaving patient's room. More than half of that time was spent face to face with the patient in counseling and coordinating care. I educated the patient about all medications.   Make sure they were correct and educated  on the risk associated with missing meds or taking them incorrectly. A list of medications is being sent home with patient today. I informed patient about the risk associated with noncompliance of medication and taking medications incorrectly. Appropriate follow-up with myself and all specialist.  Encourage family members to take active role in assisting with medications and medical care. If any confusion should develop to notify my office immediately to avoid risk of worsening medical condition      Follow Up: Return in about 4 months (around 3/12/2022).      Seen by:  Marzena Reynolds, DO

## 2021-11-18 ENCOUNTER — OFFICE VISIT (OUTPATIENT)
Dept: PAIN MANAGEMENT | Age: 81
End: 2021-11-18
Payer: MEDICARE

## 2021-11-18 ENCOUNTER — TELEPHONE (OUTPATIENT)
Dept: PAIN MANAGEMENT | Age: 81
End: 2021-11-18

## 2021-11-18 VITALS
TEMPERATURE: 96.2 F | OXYGEN SATURATION: 97 % | BODY MASS INDEX: 25.78 KG/M2 | RESPIRATION RATE: 16 BRPM | SYSTOLIC BLOOD PRESSURE: 122 MMHG | HEIGHT: 64 IN | HEART RATE: 72 BPM | DIASTOLIC BLOOD PRESSURE: 82 MMHG | WEIGHT: 151 LBS

## 2021-11-18 DIAGNOSIS — M96.1 LUMBAR POST-LAMINECTOMY SYNDROME: ICD-10-CM

## 2021-11-18 DIAGNOSIS — G89.4 CHRONIC PAIN SYNDROME: ICD-10-CM

## 2021-11-18 DIAGNOSIS — F45.1 SOMATIC SYMPTOM DISORDER, PERSISTENT, SEVERE, WITH PREDOMINANT PAIN: ICD-10-CM

## 2021-11-18 DIAGNOSIS — M54.16 LUMBAR RADICULOPATHY: ICD-10-CM

## 2021-11-18 DIAGNOSIS — M54.41 CHRONIC BILATERAL LOW BACK PAIN WITH RIGHT-SIDED SCIATICA: Primary | ICD-10-CM

## 2021-11-18 DIAGNOSIS — G89.29 CHRONIC BILATERAL LOW BACK PAIN WITH RIGHT-SIDED SCIATICA: Primary | ICD-10-CM

## 2021-11-18 PROCEDURE — 99213 OFFICE O/P EST LOW 20 MIN: CPT | Performed by: PHYSICIAN ASSISTANT

## 2021-11-18 RX ORDER — HYDROCODONE BITARTRATE AND ACETAMINOPHEN 5; 325 MG/1; MG/1
1 TABLET ORAL 2 TIMES DAILY PRN
Qty: 60 TABLET | Refills: 0 | Status: SHIPPED
Start: 2021-11-18 | End: 2022-10-03 | Stop reason: SDUPTHER

## 2021-11-18 NOTE — PROGRESS NOTES
Do you currently have any of the following:    Fever: No  Headache:  No  Cough: No  Shortness of breath: No  Exposed to anyone with these symptoms: No                                                                                                                Jason Mack presents to the [facility] on 11/18/2021. Elvie Garcias is complaining of pain in her lower back and is thinking about a shot in her back. . The pain is constant. The pain is described as aching, throbbing, shooting, stabbing and sharp. Pain is rated on her best day at a 7, on her worst day at a 9, and on average at a 8 on the VAS scale. She took her last dose of Neurontin . Elvie Garcias does not have issues with constipation. Any procedures since your last visit: No      She is not on NSAIDS and  is  on anticoagulation medications to include Plavix and is managed by Yessica Maddox DO  . Pacemaker or defibrillator: No Physician managing device is NA. Medication Contract and Consent for Opioid Use Documents Filed     Patient Documents     Type of Document Status Date Received Received By Description    Medication Contract Received 12/18/2018  8:06 AM DAFNE MALONE PAIN MANAGEMENT PT AGREEMENT 12/18/2018    Medication Contract Received 5/4/2019 10:42 AM Myah Barajas Neurosurgery medication contract    Medication Contract Received 10/8/2020  2:56 PM Izabel KANG 2020/010/08 paina greement                    /82   Pulse 72   Temp 96.2 °F (35.7 °C) (Infrared)   Resp 16   Ht 5' 3.5\" (1.613 m)   Wt 151 lb (68.5 kg)   SpO2 97%   BMI 26.33 kg/m²      No LMP recorded. Patient has had a hysterectomy.

## 2021-11-18 NOTE — PROGRESS NOTES
[address]  [address]  [place] [facility]    Follow up Note      Jason Mack     Date of Visit:  2021    CC:  Patient presents for follow up   Chief Complaint   Patient presents with    Lower Back Pain     constant ache       HPI:    Pain is unchanged. No new changes. Right foot still bothersome. Appropriate analgesia with current medications regimen: Yes. Change in quality of symptoms: no   Medication side effects:none. Recent diagnostic testing:none. Recent interventional procedures:none. She has been on anticoagulation medications to include Plavix. The patient  has not been on herbal supplements. The patient is not diabetic. Imagin2021 Lumbar Spine X-ray    FINDINGS:   Dextroscoliosis of the thoracic spine.  Redemonstration of posterior fusion   hardware extending from lower thoracic spine to level of S1.  There are   multiple pedicle screws and 2 rods.  Posterior fusion hardware appears   intact.  Multilevel laminectomy at level of the lumbar spine. Redemonstration of retrolisthesis of L1 on L2 of approximately 1.3 cm   appearing similar since prior.  Spinal stimulator leads are present with   distal aspect of leads at level of level of T8.           Impression   1.  Stable alignment status multilevel fusion with hardware extending from   lower thoracic spine to level of S1 with multiple pedicle screws and two   rods.  Hardware appears intact.       2.  Multilevel laminectomy at level of lumbar spine.       3.  Stable retrolisthesis of L1 on L2 of approximately 1.3 cm.       4.  No evidence of acute fracture.  CT follow-up may be helpful for further   evaluation given generalized osteopenia.      2021 Thoracic Spine X-ray    FINDINGS:   Dextroscoliosis of the thoracic spine.  Redemonstration of posterior fusion   hardware extending from lower thoracic spine to level of S1.  There are   multiple pedicle screws and 2 rods.  Posterior fusion hardware appears   intact.  Multilevel laminectomy at level of the lumbar spine. Redemonstration of retrolisthesis of L1 on L2 of approximately 1.3 cm   appearing similar since prior.  Spinal stimulator leads are present with   distal aspect of leads at level of level of T8.           Impression   1.  Stable alignment status multilevel fusion with hardware extending from   lower thoracic spine to level of S1 with multiple pedicle screws and two   rods.  Hardware appears intact.       2.  Multilevel laminectomy at level of lumbar spine.       3.  Stable retrolisthesis of L1 on L2 of approximately 1.3 cm.       4.  No evidence of acute fracture.  CT follow-up may be helpful for further   evaluation given generalized osteopenia.              Lumbar F/E films 2018 -   Posterior pedicle screw plates extend from J85 vertebral level to the   sacrum. Bony demineralization. Previous discectomy with loss of disc   space L1-2. Retrolisthesis L1-2 and anterolisthesis L4-5 S1. Multilevel disc space narrowing. No acute osseous finding. No   instability on flexion-extension views.      Lumbar MRI 2018 -   Posterior pedicle screw plates extend from W82 vertebral level to the   sacrum. Bony demineralization. Previous discectomy with loss of disc   space L1-2. Retrolisthesis L1-2 and anterolisthesis L4-5 S1. Multilevel disc space narrowing. No acute osseous finding. No   instability on flexion-extension views.      Lumbar MRI 2016 -                   Potential Aberrant Drug-Related Behavior: no      Urine Drug Screenin2019 + hydrocodone and tramadol  2019 consistent  10/2019 consistent for hydrocodone and metabolite  2020 Consistent  2021 Consistent     OARRS report:  2019 consistent to 2019 consistent (post op medication s/p surgery)  2020 consistent to 2021 consistent     Opioid Agreement:  10/08/2020 - new agreement to be filled out today.     Past Medical History:   Diagnosis Date    Anxiety     CVA (cerebral vascular accident) ([facility] [address]     L FACIAL SENSATION    Depression     Diverticulitis     Hyperlipidemia     Hypertension     OA (osteoarthritis) of hip     OP (osteoporosis)     Shingles     R ABD    TIA (transient ischemic attack)     Tinnitus     Wears partial dentures     upper       Past Surgical History:   Procedure Laterality Date    BACK SURGERY      x3    CHOLECYSTECTOMY      COLONOSCOPY N/A 8/28/2019    COLONOSCOPY WITH BIOPSY performed by Elvia Valdes MD at [place] [address]  8/28/2019    COLONOSCOPY POLYPECTOMY SNARE/COLD BIOPSY performed by Elvia Valdes MD at [address] [place] [address]      kuldip lense implant    HERNIA REPAIR  2018    HYSTERECTOMY      LUMBAR LAMINECTOMY      SPINAL CORD STIMULATOR IMPLANTATION N/A 5/6/2019    T8 SPINAL CORD STIMULATOR  TRIAL PLACEMENT--SUNITHA, KODAK TABLE, [employer] performed by Lucio Burnette MD at [address] [place] [address] N/A 5/14/2019    PLACEMENT OF  PERMANENT SPINAL CORD STIMULATOR---XRAY, KODAK TABLE, [employer] performed by Lucio Burnette MD at [address] [place] [address] N/A 1/9/2020    REVISION OF SPINAL CORD STIMULATOR  BATTERY AND POCKET performed by Lucio Burnette MD at [address] [place] [address] N/A 8/28/2019    EGD BIOPSY performed by Elvia Valdes MD at [address] [place] [address]       Prior to Admission medications    Medication Sig Start Date End Date Taking? Authorizing Provider   HYDROcodone-acetaminophen (NORCO) 5-325 MG per tablet Take 1 tablet by mouth 2 times daily as needed for Pain for up to 30 days.  Intended supply: 30 days 11/18/21 12/18/21 Yes SEBASTIÁN Flanagan   hydroCHLOROthiazide (HYDRODIURIL) 25 MG tablet Take 1 tablet by mouth daily 11/12/21  Yes Uriel Colunga DO   citalopram (CELEXA) 10 MG tablet Take 1 tablet by mouth daily 11/12/21  Yes Uriel Colunga DO   simvastatin (ZOCOR) 10 MG tablet Take 1 tablet by mouth daily 21  Yes Uriel Colunga DO   clopidogrel (PLAVIX) 75 MG tablet Take 1 tablet by mouth daily 21  Yes Uriel Colunga DO   gabapentin (NEURONTIN) 300 MG capsule Take 1 capsule by mouth 4 times daily for 360 days.  Takes 2 in am and 2 at [facility] 21 Yes Uriel Colunga DO   carvedilol (COREG) 12.5 MG tablet Take 1 tablet by mouth 2 times daily (with meals) 21  Yes Uriel Colunga DO   memantine (NAMENDA) 10 MG tablet Take 1 tablet by mouth 2 times daily For memory 21  Yes Uriel Colunga DO   losartan (COZAAR) 50 MG tablet Take 1 tablet by mouth 2 times daily 21  Yes Uriel Colunga DO   dicyclomine (BENTYL) 10 MG capsule Take 1 capsule by mouth 4 times daily 19  Yes Mckenzie Belle MD   pantoprazole (PROTONIX) 40 MG tablet TAKE 1 TABLET BY MOUTH ONCE DAILY ( TAKE B-12 1000MG AND D3 2000MG FOR GERD) 19  Yes Historical Provider, MD   vitamin B-12 (CYANOCOBALAMIN) 1000 MCG tablet Take 1,000 mcg by mouth daily   Yes Historical Provider, MD   vitamin D (D3-1000) 1000 units CAPS Take by mouth daily   Yes Historical Provider, MD       Allergies   Allergen Reactions    Pcn [Penicillins] Rash       Social History     Socioeconomic History    Marital status: Single     Spouse name: Not on file    Number of children: Not on file    Years of education: Not on file    Highest education level: Not on file   Occupational History    Not on file   Tobacco Use    Smoking status: Never Smoker    Smokeless tobacco: Never Used   Vaping Use    Vaping Use: Never used   Substance and Sexual Activity    Alcohol use: Yes     Comment: rare    Drug use: No    Sexual activity: Not Currently   Other Topics Concern    Not on file   Social History Narrative        CATARACTS    TINNITUS    GB OUT     [address]     CVA---F  52 GF  FROM CVA AT 46     HTN BRO    CATH     OP    TICS    BOYFRIEND Clayton Borrero Rachid Core  1940 Page #2    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 BRADLEY---CAVALEIR X RAY    CH--5   Girl struthers   Two boys kinsman    Two out of town    [address] [place] [address]    CVA AFFECT L FACIAL SENSATION -08    TOMMY LUMBAR One [facility] OR ROB-----1-09    COLON 1-10----NEG BUT PREVIOUS TICS AND POLYP    TIA -11    SHINGLES R ABD -12    MRI  WITH L-2-3 DISC AND NERVE----DR RIVAS----OR SET UP    ADMIT  WITH TIA----DR Hermila Pete DC ASA AND ADDED PLAVIX TO THE PERSANTINE    LUMBAR CHERYL OR DR RIVAS 10-12------------AGAIN DONE SEAMUS RIVAS     MOTHER  SUICIDE AGE 43    DAD  AT 52 CVA    FIRST HUS SUICIDE AT 46    X RAY 11=13 WITH NEW L-1 -2 NARROWING AND RETROLISTHESIS----SHOTS WITH DR Pedrito Mendez    ANX--DEP 1-14    OA HIPS DR CHARLTON---    DIET DM 4=16    LUMBAR DISC SURGERY 16 DR RIVAS    LEFT INGUINAL HERNIA WITH MESH DR BUNCH     ATHEROSCLEROIS EVAL DR BROTHERS     EVAL DR ALFONSO NEWMAN--- ADIVSED ENT---PT SAYS HE TOLD HER NOT GET NEBULZIER---AND    REFUSESD FOLLOW UP    T-8 SPINAL CORD STIMULATOR TRIAL 19--------implanted permanent dr Isha Yoon  ---device moved on buttock   -20    PAIN MEDS WITH DR Maricruz Moffett PAIN MEKA ABARCA [facility]    Son with  Lung ca and brain mets  ----- [place]--  at  62 yrs old    evale Canales for dementia       Pain management----------------pain doc monthly     Social Determinants of Health     Financial Resource Strain:     Difficulty of Paying Living Expenses: Not on file   Food Insecurity:     Worried About Running Out of Food in the Last Year: Not on file    Anastasia of Food in the Last Year: Not on file   Transportation Needs:     Lack of Transportation (Medical): Not on file    Lack of Transportation (Non-Medical):  Not on file   Physical Activity:     Days of Exercise per Week: Not on file    Minutes of Exercise per Session: Not on file   Stress:     Feeling of Stress : Not on file   Social Connections:     Frequency of Communication with Friends and Family: Not on file    Frequency of Social Gatherings with Friends and Family: Not on file    Attends Congregational Services: Not on file    Active Member of Clubs or Organizations: Not on file    Attends Club or Organization Meetings: Not on file    Marital Status: Not on file   Intimate Partner Violence:     Fear of Current or Ex-Partner: Not on file    Emotionally Abused: Not on file    Physically Abused: Not on file    Sexually Abused: Not on file   Housing Stability:     Unable to Pay for Housing in the Last Year: Not on file    Number of [place] in the Last Year: Not on file    Unstable Housing in the Last Year: Not on file       Family History   Problem Relation Age of Onset    Stroke Father        REVIEW OF SYSTEMS:     Linda Boyd denies fever/chills, chest pain, shortness of breath, new bowel or bladder complaints. All other review of systems was negative. PHYSICAL EXAMINATION:      /82   Pulse 72   Temp 96.2 °F (35.7 °C) (Infrared)   Resp 16   Ht 5' 3.5\" (1.613 m)   Wt 151 lb (68.5 kg)   SpO2 97%   BMI 26.33 kg/m²     General:      General appearance:   pleasant and well-hydrated. , in no discomfort and A & O x3  Build:Normal Weight  Function:Rises from a seated position with difficulty    HEENT:    Head:normocephalic and atraumatic  Pupils:regular, round and equal.  Sclera: icterus absent,    Lungs:    Breathing:Normal expansion. No wheezing. Abdomen:    Shape:non-distended and normal    Lumbar spine:    Range of motion:abnormal mildly Lateral bending, flexion, extension rotation bilateral and is painful to coccyx area. Extremities:    Tremors:None bilaterally upper and lower  Range of motion:Generally normal shoulders, pain with internal rotation of hips not done.   Intact:Yes  Varicose veins:absent   Cyanosis:none  Edema:Normal    Neurological:    Motor:   [address]    Dermatology:    Skin:no unusual rashes, no skin lesions, no palpable subcutaneous nodules and good skin turgor    Impression:    LBP and RLE pain in L5 distribution with diffuse weakness of the LLE  Multiple prior lumbar fusion surgeries with Dr. Mckayla Hooker, most recent 9/2016 extending her fusion from T10-sacrum  On PLAVIX and ASA for Hx TIA's/CVA's  Has failed multiple surgeries, injections, PT, and medications  Had staged trial with Dr. Chelo Sesay 5/6/2019 and 5/14/2019, has had some improvement in her chronic pain thus far but does need a reprogramming, prior reprogramming has been helpful until she seems to lose the stimulation in the needed areas.                  Patient is s/p:  OPERATIONS PERFORMED:               1.  Exploration of left gluteal pocket for spinal cord stimulator                implantable pulse generator.                2.  Revision of the pocket.    3.  Replacement of implantable pulse generator and new pocket     on  01/09/2020.      OARRS reviewed 11/2021  Right foot - call rep regarding coverage. Continue Norco 5/325 BID #60, Lasted over 4 months +. Patient interested in epidural for tailbone pain. Will discuss with Dr. Anthony Iraheta due to her complicated history and failing epidurals in the past.   Continue gabapentin as currently prescribed from outside provider, TID  Continue off tizanidine if able, can take prn if necessary.  Has refills.    Compounding pain cream - ineffective  Aquatic PT - on hold at this time. Patient encouraged to stay active  Treatment plan discussed with the patient including medication side    effects. Controlled Substance Monitoring:    Acute and Chronic Pain Monitoring:   RX Monitoring 11/18/2021   Attestation -   Periodic Controlled Substance Monitoring Possible medication side effects, risk of tolerance/dependence & alternative treatments discussed. ;No signs of potential drug abuse or diversion identified. ;Assessed functional status. ;Obtaining appropriate analgesic effect of treatment. ;Random urine drug screen sent today.                        We discussed with the patient that combining opioids, benzodiazepines, alcohol, illicit drugs or sleep aids increases the risk of respiratory depression including death. We discussed that these medications may cause drowsiness, sedation or dizziness and have counseled the patient not to drive or operate machinery. We have discussed that these medications will be prescribed only by one provider. We have discussed with the patient about age related risk factors and have thoroughly discussed the importance of taking these medications as prescribed. The patient verbalizes understanding.

## 2021-11-18 NOTE — TELEPHONE ENCOUNTER
Discussed case with Dr. Otto Tucker. Left message to return call. If she returns call, we can set her up for a caudal epidural injection for her tailbone pain if she would like.

## 2021-12-13 NOTE — PROGRESS NOTES
rods.  Posterior fusion hardware appears   intact.  Multilevel laminectomy at level of the lumbar spine. Redemonstration of retrolisthesis of L1 on L2 of approximately 1.3 cm   appearing similar since prior.  Spinal stimulator leads are present with   distal aspect of leads at level of level of T8.           Impression   1.  Stable alignment status multilevel fusion with hardware extending from   lower thoracic spine to level of S1 with multiple pedicle screws and two   rods.  Hardware appears intact.       2.  Multilevel laminectomy at level of lumbar spine.       3.  Stable retrolisthesis of L1 on L2 of approximately 1.3 cm.       4.  No evidence of acute fracture.  CT follow-up may be helpful for further   evaluation given generalized osteopenia.              Lumbar F/E films 2018 -   Posterior pedicle screw plates extend from P82 vertebral level to the   sacrum. Bony demineralization. Previous discectomy with loss of disc   space L1-2. Retrolisthesis L1-2 and anterolisthesis L4-5 S1. Multilevel disc space narrowing. No acute osseous finding. No   instability on flexion-extension views.      Lumbar MRI 2018 -   Posterior pedicle screw plates extend from F15 vertebral level to the   sacrum. Bony demineralization. Previous discectomy with loss of disc   space L1-2. Retrolisthesis L1-2 and anterolisthesis L4-5 S1. Multilevel disc space narrowing. No acute osseous finding. No   instability on flexion-extension views.      Lumbar MRI 2016 -                   Potential Aberrant Drug-Related Behavior: no      Urine Drug Screenin2019 + hydrocodone and tramadol  2019 consistent  10/2019 consistent for hydrocodone and metabolite  2020 Consistent  2021 Consistent  2021 Consistent for gabapentin.   Negative for norco but she had not had a prescription for months.       OARRS report:  2019 consistent to 2019 consistent (post op medication s/p surgery)  2020 consistent AE [id number]/9903 [id number]     Opioid Agreement:  11/23/2021    Past Medical History:   Diagnosis Date    Anxiety     CVA (cerebral vascular accident) ([address]     L FACIAL SENSATION    Depression     Diverticulitis     Hyperlipidemia     Hypertension     OA (osteoarthritis) of hip     OP (osteoporosis)     Shingles     R ABD    TIA (transient ischemic attack)     Tinnitus     Wears partial dentures     upper       Past Surgical History:   Procedure Laterality Date    BACK SURGERY      x3    CHOLECYSTECTOMY      COLONOSCOPY N/A 8/28/2019    COLONOSCOPY WITH BIOPSY performed by Rebel Aleman MD at [place] [address]  8/28/2019    COLONOSCOPY POLYPECTOMY SNARE/COLD BIOPSY performed by Rebel Aleman MD at [address] [place] [address] 63      kuldip lense implant    HERNIA REPAIR  2018    HYSTERECTOMY      LUMBAR LAMINECTOMY      SPINAL CORD STIMULATOR IMPLANTATION N/A 5/6/2019    T8 SPINAL CORD STIMULATOR  TRIAL PLACEMENT--SUNITHA, KODAK TABLE, [employer] performed by Gianluca Waters MD at [address] [place] [address] N/A 5/14/2019    PLACEMENT OF  PERMANENT SPINAL CORD STIMULATOR---XRLAVERNE, KODAK TABLE, BOST[employer]ENT[employer] performed by Gianluca Waters MD at [address] [place] [address] N/A 1/9/2020    REVISION OF SPINAL CORD STIMULATOR  BATTERY AND POCKET performed by Gianluca Waters MD at [address] N/A 8/28/2019    EGD BIOPSY performed by Rebel Aleman MD at [address] [place] [address]       Prior to Admission medications    Medication Sig Start Date End Date Taking? Authorizing Provider   HYDROcodone-acetaminophen (NORCO) 5-325 MG per tablet Take 1 tablet by mouth 2 times daily as needed for Pain for up to 30 days.  Intended supply: 30 days 11/18/21 12/18/21 Yes SEBASTIÁN Soares   hydroCHLOROthiazide (HYDRODIURIL) 25 MG tablet Take 1 tablet by mouth daily 11/12/21  Yes Uriel Colunga DO   citalopram (CELEXA) 10 MG tablet Take 1 tablet by mouth daily 11/12/21  Yes Uriel Colunga DO   simvastatin (ZOCOR) 10 MG tablet Take 1 tablet by mouth daily 11/12/21  Yes Uriel Colunga DO   clopidogrel (PLAVIX) 75 MG tablet Take 1 tablet by mouth daily 11/12/21  Yes Uriel Colunga DO   gabapentin (NEURONTIN) 300 MG capsule Take 1 capsule by mouth 4 times daily for 360 days.  Takes 2 in am and 2 at [facility] 11/12/21 11/7/22 Yes Uriel Colunga DO   carvedilol (COREG) 12.5 MG tablet Take 1 tablet by mouth 2 times daily (with meals) 11/12/21  Yes Uriel Colunga DO   memantine (NAMENDA) 10 MG tablet Take 1 tablet by mouth 2 times daily For memory 11/12/21  Yes Uriel Colunga DO   losartan (COZAAR) 50 MG tablet Take 1 tablet by mouth 2 times daily 11/12/21  Yes Uriel Colunga DO   dicyclomine (BENTYL) 10 MG capsule Take 1 capsule by mouth 4 times daily 8/13/19  Yes Suraj Alanis MD   pantoprazole (PROTONIX) 40 MG tablet TAKE 1 TABLET BY MOUTH ONCE DAILY ( TAKE B-12 1000MG AND D3 2000MG FOR GERD) 5/19/19  Yes Historical Provider, MD   vitamin B-12 (CYANOCOBALAMIN) 1000 MCG tablet Take 1,000 mcg by mouth daily   Yes Historical Provider, MD   vitamin D (D3-1000) 1000 units CAPS Take by mouth daily   Yes Historical Provider, MD       Allergies   Allergen Reactions    Pcn [Penicillins] Rash       Social History     Socioeconomic History    Marital status: Single     Spouse name: Not on file    Number of children: Not on file    Years of education: Not on file    Highest education level: Not on file   Occupational History    Not on file   Tobacco Use    Smoking status: Never Smoker    Smokeless tobacco: Never Used   Vaping Use    Vaping Use: Never used   Substance and Sexual Activity    Alcohol use: Yes     Comment: rare    Drug use: No    Sexual activity: Not Currently   Other Topics Concern    Not on file   Social History Narrative        CATARACTS    TINNITUS     [employer]     CVA---F  52 GF  FROM CVA AT 46    FH HTN BRO    CATH NEG     OP    TICS    BOYFRIEND NHAN Francis Economy  1940 Page #2    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 BRADLEY---CAVALEIR X RAY    CH--5   Girl struthers   Two boys kinsman    Two out of town    [address]    CVA AFFECT L FACIAL SENSATION 2-08    TOMMY LUMBAR One [facility] Aaron OR ROB-----1    COLON 1-10----NEG BUT PREVIOUS TICS AND POLYP    TIA     SHINGLES R ABD     MRI  WITH L-2-3 DISC AND NERVE----DR RIVAS----OR SET UP    ADMIT  WITH TIA----DR Leandra Harding DC ASA AND ADDED PLAVIX TO THE PERSANTINE    LUMBAR CHERYL OR DR RIVAS 10-12------------AGAIN DONE SEAMSU RIVAS 16    MOTHER  SUICIDE AGE 43    DAD  AT 52 CVA    FIRST HUS SUICIDE AT 46    X RAY 11=13 WITH NEW L-1 -2 NARROWING AND RETROLISTHESIS----SHOTS WITH DR Santiago Koenig    ANX--DEP 1-14    OA HIPS DR CHARLTON---    DIET DM 4=16    LUMBAR DISC SURGERY 16 DR RIVAS    LEFT INGUINAL HERNIA WITH MESH DR BUNCH     ATHEROSCLEROIS EVAL DR BROTHERS     EVAL DR ALFONSO NEWMAN--- ADIVSED ENT---PT SAYS HE TOLD HER NOT GET NEBULZIER---AND    REFUSESD FOLLOW UP    T-8 SPINAL CORD STIMULATOR TRIAL 19--------implanted permanent dr Flako Clemens  ---device moved on buttock   -    PAIN MEDS WITH DR Cassie [facility]keefe [facility]    Son with  Lung ca and brain mets  ----- [place]--  at  62 yrs old    eval dr Bettie Borrego for dementia       Pain management----------------pain doc monthly     Social Determinants of Health     Financial Resource Strain:     Difficulty of Paying Living Expenses: Not on file   Food Insecurity:     Worried About Running Out of Food in the Last Year: Not on file    Anastasia of Food in the Last Year: Not on file   Transportation Needs:     Lack of Transportation (Medical): Not on file    Lack of Transportation (Non-Medical):  Not on file   Physical Activity:     Days of Exercise per Week: Not on file    Minutes of Exercise per Session: Not on file   Stress:     Feeling of Stress : Not on file   Social Connections:     Frequency of Communication with Friends and Family: Not on file    Frequency of Social Gatherings with Friends and Family: Not on file    Attends Islam Services: Not on file    Active Member of [address] [place] [address] or Organizations: Not on file    Attends Club or Organization Meetings: Not on file    Marital Status: Not on file   Intimate Partner Violence:     Fear of Current or Ex-Partner: Not on file    Emotionally Abused: Not on file    Physically Abused: Not on file    Sexually Abused: Not on file   Housing Stability:     Unable to Pay for Housing in the Last Year: Not on file    Number of [place] in the Last Year: Not on file    Unstable Housing in the Last Year: Not on file       Family History   Problem Relation Age of Onset    Stroke Father        REVIEW OF SYSTEMS:     Paras Espinal denies fever/chills, chest pain, shortness of breath, new bowel or bladder complaints. All other review of systems was negative. PHYSICAL EXAMINATION:      BP (!) 140/80   Pulse 87   Temp 95.5 °F (35.3 °C) (Infrared)   Resp 16   Ht 5' 3.5\" (1.613 m)   Wt 151 lb (68.5 kg)   SpO2 97%   BMI 26.33 kg/m²     General:      General appearance:   pleasant and well-hydrated. , in no discomfort and A & O x3  Build:Normal Weight  Function:Rises from a seated position with difficulty    HEENT:    Head:normocephalic and atraumatic  Pupils:regular, round and equal.  Sclera: icterus absent,    Lungs:    Breathing:Normal expansion. No wheezing. Abdomen:    Shape:non-distended and normal    Lumbar spine:    Range of motion:abnormal mildly Lateral bending, flexion, extension rotation bilateral and is painful to coccyx area. Extremities:    Tremors:None bilaterally upper and lower  Range of motion:Generally normal shoulders, pain with internal rotation of hips not done.   Intact:Yes  Varicose veins:absent Cyanosis:none  Edema:Normal    Neurological:    Motor:   [address]    Dermatology:    Skin:no unusual rashes, no skin lesions, no palpable subcutaneous nodules and good skin turgor    Impression:    LBP and RLE pain in L5 distribution with diffuse weakness of the LLE  Multiple prior lumbar fusion surgeries with Dr. Nas Browne, most recent 9/2016 extending her fusion from T10-sacrum  On PLAVIX and ASA for Hx TIA's/CVA's  Has failed multiple surgeries, injections, PT, and medications  Had staged trial with Dr. Rob Mahoney 5/6/2019 and 5/14/2019, has had some improvement in her chronic pain thus far but does need a reprogramming, prior reprogramming has been helpful until she seems to lose the stimulation in the needed areas.                  Patient is s/p:  OPERATIONS PERFORMED:               1.  Exploration of left gluteal pocket for spinal cord stimulator                implantable pulse generator.                2.  Revision of the pocket.    3.  Replacement of implantable pulse generator and new pocket     on  01/09/2020.      She is overall doing well with her pain at this point. OARRS reviewed 12/2021  Right foot - call rep regarding coverage. Continue Norco 5/325 BID #60, Lasted over 4 months +. Patient still has 2/3 of her bottle left. I did not give her a prescription today. She may call for a refill and follow up after that when running low. Patient would like to hold off on injections at this time. Continue gabapentin as currently prescribed from outside provider, TID  Continue off tizanidine if able, can take prn if necessary.  Has refills.    Compounding pain cream - ineffective  [facility] PT - on hold at this time. Patient encouraged to stay active  Treatment plan discussed with the patient including medication side    effects.     Controlled Substance Monitoring:    Acute and Chronic Pain Monitoring:   RX Monitoring 12/16/2021   Attestation -   Periodic Controlled Substance Monitoring Possible medication side effects, risk of tolerance/dependence & alternative treatments discussed. ;No signs of potential drug abuse or diversion identified. ;Assessed functional status. ;Obtaining appropriate analgesic effect of treatment.                        We discussed with the patient that combining opioids, benzodiazepines, alcohol, illicit drugs or sleep aids increases the risk of respiratory depression including death. We discussed that these medications may cause drowsiness, sedation or dizziness and have counseled the patient not to drive or operate machinery. We have discussed that these medications will be prescribed only by one provider. We have discussed with the patient about age related risk factors and have thoroughly discussed the importance of taking these medications as prescribed. The patient verbalizes understanding.

## 2021-12-16 ENCOUNTER — OFFICE VISIT (OUTPATIENT)
Dept: PAIN MANAGEMENT | Age: 81
End: 2021-12-16
Payer: MEDICARE

## 2021-12-16 VITALS
OXYGEN SATURATION: 97 % | WEIGHT: 151 LBS | HEIGHT: 64 IN | RESPIRATION RATE: 16 BRPM | TEMPERATURE: 95.5 F | SYSTOLIC BLOOD PRESSURE: 140 MMHG | HEART RATE: 87 BPM | DIASTOLIC BLOOD PRESSURE: 80 MMHG | BODY MASS INDEX: 25.78 KG/M2

## 2021-12-16 DIAGNOSIS — G89.4 CHRONIC PAIN SYNDROME: Chronic | ICD-10-CM

## 2021-12-16 DIAGNOSIS — M96.1 LUMBAR POST-LAMINECTOMY SYNDROME: ICD-10-CM

## 2021-12-16 DIAGNOSIS — M54.16 LUMBAR RADICULOPATHY: Primary | ICD-10-CM

## 2021-12-16 PROCEDURE — 99213 OFFICE O/P EST LOW 20 MIN: CPT | Performed by: PHYSICIAN ASSISTANT

## 2021-12-16 NOTE — PROGRESS NOTES
Do you currently have any of the following:    Fever: No  Headache:  No  Cough: No  Shortness of breath: No  Exposed to anyone with these symptoms: No                                                                                                                Paola Kauffman presents to the [address] on 12/16/2021. [facility] is complaining of pain tailbone and foot, right, The pain is constant. The pain is described as pins and needles and numbness. Pain is rated on her best day at a 6, on her worst day at a 8, and on average at a 7 on the VAS scale. She took her last dose of Norco, Neurontin and Tylenol . [facility] does not have issues with constipation. Any procedures since your last visit: No,   She is not on NSAIDS and  is  on anticoagulation medications to include Plavix and is managed by Nathen Ho DO  . Pacemaker or defibrillator: No .    Medication Contract and Consent for Opioid Use Documents Filed     Patient Documents     Type of Document Status Date Received Received By Description    Medication Contract Received 12/18/2018  8:06 AM [facility] 12/18/2018    Medication Contract Received 5/4/2019 10:42 AM [facility] medication contract    Medication Contract Received 10/8/2020  2:56 PM Venessa Salle 2020/010/08 paina greement     Medication Contract Received 11/23/2021 10:53 AM JADA DAVIS MEDICATION CONTRACT                   BP (!) 140/80   Pulse 87   Temp 95.5 °F (35.3 °C) (Infrared)   Resp 16   Ht 5' 3.5\" (1.613 m)   Wt 151 lb (68.5 kg)   SpO2 97%   BMI 26.33 kg/m²      No LMP recorded. Patient has had a hysterectomy.

## 2022-04-20 ENCOUNTER — OFFICE VISIT (OUTPATIENT)
Dept: PRIMARY CARE CLINIC | Age: 82
End: 2022-04-20
Payer: MEDICARE

## 2022-04-20 DIAGNOSIS — M81.0 AGE-RELATED OSTEOPOROSIS WITHOUT CURRENT PATHOLOGICAL FRACTURE: ICD-10-CM

## 2022-04-20 DIAGNOSIS — E11.9 DIET-CONTROLLED DIABETES MELLITUS (HCC): ICD-10-CM

## 2022-04-20 DIAGNOSIS — I10 ESSENTIAL HYPERTENSION: ICD-10-CM

## 2022-04-20 DIAGNOSIS — E03.9 ACQUIRED HYPOTHYROIDISM: ICD-10-CM

## 2022-04-20 DIAGNOSIS — J20.8 ACUTE BRONCHITIS DUE TO OTHER SPECIFIED ORGANISMS: ICD-10-CM

## 2022-04-20 DIAGNOSIS — D51.8 VITAMIN B12 DEFICIENCY (DIETARY) ANEMIA: ICD-10-CM

## 2022-04-20 DIAGNOSIS — Z00.00 MEDICARE ANNUAL WELLNESS VISIT, SUBSEQUENT: Primary | ICD-10-CM

## 2022-04-20 DIAGNOSIS — E78.2 MIXED HYPERLIPIDEMIA: ICD-10-CM

## 2022-04-20 PROCEDURE — G0439 PPPS, SUBSEQ VISIT: HCPCS | Performed by: FAMILY MEDICINE

## 2022-04-20 RX ORDER — DOXYCYCLINE HYCLATE 100 MG
100 TABLET ORAL 2 TIMES DAILY
Qty: 20 TABLET | Refills: 0 | Status: SHIPPED | OUTPATIENT
Start: 2022-04-20 | End: 2022-04-30

## 2022-04-20 RX ORDER — BENZONATATE 100 MG/1
100-200 CAPSULE ORAL 3 TIMES DAILY PRN
Qty: 60 CAPSULE | Refills: 0 | Status: SHIPPED | OUTPATIENT
Start: 2022-04-20 | End: 2022-04-27

## 2022-04-20 RX ORDER — PREDNISONE 10 MG/1
10 TABLET ORAL
Qty: 15 TABLET | Refills: 0 | Status: SHIPPED | OUTPATIENT
Start: 2022-04-20 | End: 2022-04-25

## 2022-04-20 ASSESSMENT — PATIENT HEALTH QUESTIONNAIRE - PHQ9
SUM OF ALL RESPONSES TO PHQ QUESTIONS 1-9: 0
1. LITTLE INTEREST OR PLEASURE IN DOING THINGS: 0
SUM OF ALL RESPONSES TO PHQ QUESTIONS 1-9: 0
SUM OF ALL RESPONSES TO PHQ9 QUESTIONS 1 & 2: 0
2. FEELING DOWN, DEPRESSED OR HOPELESS: 0

## 2022-04-20 ASSESSMENT — LIFESTYLE VARIABLES: HOW OFTEN DO YOU HAVE A DRINK CONTAINING ALCOHOL: NEVER

## 2022-04-20 NOTE — PATIENT INSTRUCTIONS
Personalized Preventive Plan for Monet Ross - 4/20/2022  Medicare offers a range of preventive health benefits. Some of the tests and screenings are paid in full while other may be subject to a deductible, co-insurance, and/or copay. Some of these benefits include a comprehensive review of your medical history including lifestyle, illnesses that may run in your family, and various assessments and screenings as appropriate. After reviewing your medical record and screening and assessments performed today your provider may have ordered immunizations, labs, imaging, and/or referrals for you. A list of these orders (if applicable) as well as your Preventive Care list are included within your After Visit Summary for your review. Other Preventive Recommendations:    · A preventive eye exam performed by an eye specialist is recommended every 1-2 years to screen for glaucoma; cataracts, macular degeneration, and other eye disorders. · A preventive dental visit is recommended every 6 months. · Try to get at least 150 minutes of exercise per week or 10,000 steps per day on a pedometer . · Order or download the FREE \"Exercise & Physical Activity: Your Everyday Guide\" from [employer]ging. Call 9-730.166.6411 or search [employer] online. · You need 7685-3877 mg of calcium and 9930-6656 IU of vitamin D per day. It is possible to meet your calcium requirement with diet alone, but a vitamin D supplement is usually necessary to meet this goal.  · When exposed to the sun, use a sunscreen that protects against both UVA and UVB radiation with an SPF of 30 or greater. Reapply every 2 to 3 hours or after sweating, drying off with a towel, or swimming. · Always wear a seat belt when traveling in a car. Always wear a helmet when riding a bicycle or motorcycle.

## 2022-04-20 NOTE — PROGRESS NOTES
Medicare Annual Wellness Visit    Katharine Ortiz is here for Medicare AWV    Assessment & Plan   Medicare annual wellness visit, subsequent  Acute bronchitis due to other specified organisms  -     predniSONE (DELTASONE) 10 MG tablet; Take 1 tablet by mouth 3 times daily (with meals) for 5 days, Disp-15 tablet, R-0Normal  -     benzonatate (TESSALON) 100 MG capsule; Take 1-2 capsules by mouth 3 times daily as needed for Cough, Disp-60 capsule, R-0Normal  -     doxycycline hyclate (VIBRA-TABS) 100 MG tablet; Take 1 tablet by mouth 2 times daily for 10 days, Disp-20 tablet, R-0Normal  -     XR CHEST (2 VW); Future      Recommendations for Preventive Services Due: see orders and patient instructions/AVS.  Recommended screening schedule for the next 5-10 years is provided to the patient in written form: see Patient Instructions/AVS.     Return in 6 weeks (on 6/1/2022) for fasting appointment. Subjective   The following acute and/or chronic problems were also addressed today:  Cough for 10 days. Presents with her daughter. No chills or temperature. Moving to a chair nursing notes time. Patient's complete Health Risk Assessment and screening values have been reviewed and are found in Flowsheets. The following problems were reviewed today and where indicated follow up appointments were made and/or referrals ordered.     Positive Risk Factor Screenings with Interventions:              Health Habits/Nutrition:     Physical Activity: Inactive    Days of Exercise per Week: 0 days    Minutes of Exercise per Session: 0 min     Have you lost any weight without trying in the past 3 months?: No     Have you seen the dentist within the past year?: Yes    Health Habits/Nutrition Interventions:  · Inadequate physical activity:  patient agrees to exercise for at least 150 minutes/week     Safety:  Do you have working smoke detectors?: Yes  Do you have any tripping hazards - loose or unsecured carpets or rugs?: No  Do you have any tripping hazards - clutter in doorways, halls, or stairs?: No  Do you have either shower bars, grab bars, non-slip mats or non-slip surfaces in your shower or bathtub?: Yes  Do all of your stairways have a railing or banister?: (!) No  Do you always fasten your seatbelt when you are in a car?: Yes    Safety Interventions:  · Patient declines any further evaluation/treatment for this issue           Objective   Vitals:    04/20/22 1107   BP: 128/83   Temp: 96.2 °F (35.7 °C)   TempSrc: Infrared   Weight: 154 lb (69.9 kg)      Body mass index is 26.85 kg/m². General Appearance: alert and oriented to person, place and time, well developed and well- nourished, in no acute distress  Skin: warm and dry, no rash or erythema  Head: normocephalic and atraumatic  Eyes: pupils equal, round, and reactive to light, extraocular eye movements intact, conjunctivae normal  ENT: tympanic membrane, external ear and ear canal normal bilaterally, nose without deformity, nasal mucosa and turbinates normal without polyps  Neck: supple and non-tender without mass, no thyromegaly or thyroid nodules, no cervical lymphadenopathy  Pulmonary/Chest: clear to auscultation bilaterally- no wheezes, rales or rhonchi, normal air movement, no respiratory distress  Cardiovascular: normal rate, regular rhythm, normal S1 and S2, no murmurs, rubs, clicks, or gallops, distal pulses intact, no carotid bruits  Abdomen: soft, non-tender, non-distended, normal bowel sounds, no masses or organomegaly  Extremities: no cyanosis, clubbing or edema  Musculoskeletal: normal range of motion, no joint swelling, deformity or tenderness  Neurologic: reflexes normal and symmetric, no cranial nerve deficit, gait, coordination and speech normal       Allergies   Allergen Reactions    Pcn [Penicillins] Rash     Prior to Visit Medications    Medication Sig Taking?  Authorizing Provider   predniSONE (DELTASONE) 10 MG tablet Take 1 tablet by mouth 3 times daily (with meals) for 5 days Yes Uriel Colunga DO   benzonatate (TESSALON) 100 MG capsule Take 1-2 capsules by mouth 3 times daily as needed for Cough Yes Uriel Colunga DO   doxycycline hyclate (VIBRA-TABS) 100 MG tablet Take 1 tablet by mouth 2 times daily for 10 days Yes Uriel Colunga DO   hydroCHLOROthiazide (HYDRODIURIL) 25 MG tablet Take 1 tablet by mouth daily  Uriel Colunga DO   citalopram (CELEXA) 10 MG tablet Take 1 tablet by mouth daily  Uriel Colunga DO   simvastatin (ZOCOR) 10 MG tablet Take 1 tablet by mouth daily  Uriel Colunga DO   clopidogrel (PLAVIX) 75 MG tablet Take 1 tablet by mouth daily  Uriel Colunga DO   gabapentin (NEURONTIN) 300 MG capsule Take 1 capsule by mouth 4 times daily for 360 days. Takes 2 in am and 2 at [address], DO   carvedilol (COREG) 12.5 MG tablet Take 1 tablet by mouth 2 times daily (with meals)  Uriel Colunga DO   memantine (NAMENDA) 10 MG tablet Take 1 tablet by mouth 2 times daily For memory  Uriel Colunga DO   losartan (COZAAR) 50 MG tablet Take 1 tablet by mouth 2 times daily  Uriel Colunga DO   dicyclomine (BENTYL) 10 MG capsule Take 1 capsule by mouth 4 times daily  Rehana Merino MD   pantoprazole (PROTONIX) 40 MG tablet TAKE 1 TABLET BY MOUTH ONCE DAILY ( TAKE B-12 1000MG AND D3 2000MG FOR GERD)  Historical Provider, MD   vitamin B-12 (CYANOCOBALAMIN) 1000 MCG tablet Take 1,000 mcg by mouth daily  Historical Provider, MD   vitamin D (D3-1000) 1000 units CAPS Take by mouth daily  Historical Provider, MD Garcia (Including outside providers/suppliers regularly involved in providing care):   Patient Care Team:  Bert Simon DO as PCP - [facility] Dalton Rinaldi DO as PCP - [facility] Empaneled Provider    Reviewed and updated this visit:  Tobacco  Allergies  Med Hx  Surg Hx  Soc Hx  Fam Hx              Medication prescribed if not better see. Worse go to ER.   See me back in 1 4 weeks of blood work a week before      I educated the patient about all medications. Make sure they were correct and educated  on the risk associated with missing meds or taking them incorrectly. A list of medications is being sent home with patient today. I informed patient about the risk associated with noncompliance of medication and taking medications incorrectly. Appropriate follow-up with myself and all specialist.  Encourage family members to take active role in assisting with medications and medical care. If any confusion should develop to notify my office immediately to avoid risk of worsening medical condition    A great deal of time spent reviewing medications, diet, exercise, social issues. Also reviewing the chart before entering the room with patient and finishing charting after leaving patient's room. More than half of that time was spent face to face with the patient in counseling and coordinating care.

## 2022-04-21 ENCOUNTER — TELEPHONE (OUTPATIENT)
Dept: PRIMARY CARE CLINIC | Age: 82
End: 2022-04-21

## 2022-04-21 VITALS
SYSTOLIC BLOOD PRESSURE: 128 MMHG | TEMPERATURE: 96.2 F | BODY MASS INDEX: 26.85 KG/M2 | WEIGHT: 154 LBS | DIASTOLIC BLOOD PRESSURE: 83 MMHG

## 2022-05-28 ENCOUNTER — APPOINTMENT (OUTPATIENT)
Dept: CT IMAGING | Age: 82
End: 2022-05-28
Payer: MEDICARE

## 2022-05-28 ENCOUNTER — APPOINTMENT (OUTPATIENT)
Dept: GENERAL RADIOLOGY | Age: 82
End: 2022-05-28
Payer: MEDICARE

## 2022-05-28 ENCOUNTER — HOSPITAL ENCOUNTER (EMERGENCY)
Age: 82
Discharge: LEFT BEFORE DISCHARGE | End: 2022-05-29
Payer: MEDICARE

## 2022-05-28 LAB
ALBUMIN SERPL-MCNC: 4.4 G/DL (ref 3.5–5.2)
ALP BLD-CCNC: 79 U/L (ref 35–104)
ALT SERPL-CCNC: 15 U/L (ref 0–32)
ANION GAP SERPL CALCULATED.3IONS-SCNC: 11 MMOL/L (ref 7–16)
APTT: 27.6 SEC (ref 24.5–35.1)
AST SERPL-CCNC: 16 U/L (ref 0–31)
BASOPHILS ABSOLUTE: 0.07 E9/L (ref 0–0.2)
BASOPHILS RELATIVE PERCENT: 0.8 % (ref 0–2)
BILIRUB SERPL-MCNC: <0.2 MG/DL (ref 0–1.2)
BUN BLDV-MCNC: 14 MG/DL (ref 6–23)
CALCIUM SERPL-MCNC: 9.2 MG/DL (ref 8.6–10.2)
CHLORIDE BLD-SCNC: 101 MMOL/L (ref 98–107)
CO2: 26 MMOL/L (ref 22–29)
CREAT SERPL-MCNC: 0.8 MG/DL (ref 0.5–1)
EOSINOPHILS ABSOLUTE: 0.21 E9/L (ref 0.05–0.5)
EOSINOPHILS RELATIVE PERCENT: 2.4 % (ref 0–6)
GFR AFRICAN AMERICAN: >60
GFR NON-AFRICAN AMERICAN: >60 ML/MIN/1.73
GLUCOSE BLD-MCNC: 112 MG/DL (ref 74–99)
HCT VFR BLD CALC: 38.3 % (ref 34–48)
HEMOGLOBIN: 12.6 G/DL (ref 11.5–15.5)
IMMATURE GRANULOCYTES #: 0.04 E9/L
IMMATURE GRANULOCYTES %: 0.5 % (ref 0–5)
INR BLD: 1
LYMPHOCYTES ABSOLUTE: 4.07 E9/L (ref 1.5–4)
LYMPHOCYTES RELATIVE PERCENT: 47.3 % (ref 20–42)
MCH RBC QN AUTO: 32.4 PG (ref 26–35)
MCHC RBC AUTO-ENTMCNC: 32.9 % (ref 32–34.5)
MCV RBC AUTO: 98.5 FL (ref 80–99.9)
MONOCYTES ABSOLUTE: 0.85 E9/L (ref 0.1–0.95)
MONOCYTES RELATIVE PERCENT: 9.9 % (ref 2–12)
NEUTROPHILS ABSOLUTE: 3.37 E9/L (ref 1.8–7.3)
NEUTROPHILS RELATIVE PERCENT: 39.1 % (ref 43–80)
PDW BLD-RTO: 11.9 FL (ref 11.5–15)
PLATELET # BLD: 347 E9/L (ref 130–450)
PMV BLD AUTO: 9.3 FL (ref 7–12)
POTASSIUM REFLEX MAGNESIUM: 4.1 MMOL/L (ref 3.5–5)
PROTHROMBIN TIME: 10.8 SEC (ref 9.3–12.4)
RBC # BLD: 3.89 E12/L (ref 3.5–5.5)
SODIUM BLD-SCNC: 138 MMOL/L (ref 132–146)
TOTAL PROTEIN: 7 G/DL (ref 6.4–8.3)
TROPONIN, HIGH SENSITIVITY: 10 NG/L (ref 0–9)
WBC # BLD: 8.6 E9/L (ref 4.5–11.5)

## 2022-05-28 PROCEDURE — 93005 ELECTROCARDIOGRAM TRACING: CPT | Performed by: PHYSICIAN ASSISTANT

## 2022-05-28 PROCEDURE — 84484 ASSAY OF TROPONIN QUANT: CPT

## 2022-05-28 PROCEDURE — 36415 COLL VENOUS BLD VENIPUNCTURE: CPT

## 2022-05-28 PROCEDURE — 70450 CT HEAD/BRAIN W/O DYE: CPT

## 2022-05-28 PROCEDURE — 80053 COMPREHEN METABOLIC PANEL: CPT

## 2022-05-28 PROCEDURE — 71046 X-RAY EXAM CHEST 2 VIEWS: CPT

## 2022-05-28 PROCEDURE — 85025 COMPLETE CBC W/AUTO DIFF WBC: CPT

## 2022-05-28 PROCEDURE — 85610 PROTHROMBIN TIME: CPT

## 2022-05-28 PROCEDURE — 4500000002 HC ER NO CHARGE

## 2022-05-28 PROCEDURE — 85730 THROMBOPLASTIN TIME PARTIAL: CPT

## 2022-05-28 RX ORDER — HYDROCODONE BITARTRATE AND ACETAMINOPHEN 5; 325 MG/1; MG/1
1 TABLET ORAL EVERY 6 HOURS PRN
COMMUNITY
End: 2022-10-03

## 2022-05-28 ASSESSMENT — PAIN SCALES - GENERAL: PAINLEVEL_OUTOF10: 5

## 2022-05-28 ASSESSMENT — PAIN DESCRIPTION - LOCATION: LOCATION: HEAD

## 2022-05-28 ASSESSMENT — PAIN - FUNCTIONAL ASSESSMENT: PAIN_FUNCTIONAL_ASSESSMENT: 0-10

## 2022-05-28 ASSESSMENT — PAIN DESCRIPTION - DESCRIPTORS: DESCRIPTORS: ACHING

## 2022-05-28 ASSESSMENT — PAIN DESCRIPTION - ORIENTATION: ORIENTATION: ANTERIOR

## 2022-05-28 NOTE — ED NOTES
Department of Emergency Medicine  FIRST PROVIDER TRIAGE NOTE             Independent MLP           5/28/22  6:25 PM EDT    Date of Encounter: 5/28/22   MRN: [id number]      HPI: Venessa Coto is a 80 y.o. female who presents to the ED for No chief complaint on file. elevated blood pressure numbness tingling left cheek . Left hand started last pm at 1800     ROS: Negative for cp or sob. PE: Gen Appearance/Constitutional: alert  CV: regular rate  Pulm: CTA bilat     Initial Plan of Care: All treatment areas with department are currently occupied. Plan to order/Initiate the following while awaiting opening in ED: labs, EKG and imaging studies.   Initiate Treatment-Testing, Proceed toTreatment Area When Bed Available for ED Attending/MLP to Continue Care    Electronically signed by SEBASTIÁN Quesada   DD: 5/28/22       SEBASTIÁN Quesada  05/28/22 [id number]

## 2022-05-29 VITALS
HEIGHT: 63 IN | DIASTOLIC BLOOD PRESSURE: 101 MMHG | WEIGHT: 150 LBS | BODY MASS INDEX: 26.58 KG/M2 | RESPIRATION RATE: 16 BRPM | TEMPERATURE: 99.1 F | SYSTOLIC BLOOD PRESSURE: 161 MMHG | HEART RATE: 80 BPM | OXYGEN SATURATION: 98 %

## 2022-05-29 LAB
EKG ATRIAL RATE: 77 BPM
EKG P AXIS: 46 DEGREES
EKG P-R INTERVAL: 154 MS
EKG Q-T INTERVAL: 386 MS
EKG QRS DURATION: 88 MS
EKG QTC CALCULATION (BAZETT): 436 MS
EKG R AXIS: 52 DEGREES
EKG T AXIS: 29 DEGREES
EKG VENTRICULAR RATE: 77 BPM
TROPONIN, HIGH SENSITIVITY: 9 NG/L (ref 0–9)

## 2022-05-29 PROCEDURE — 84484 ASSAY OF TROPONIN QUANT: CPT

## 2022-05-29 NOTE — ED NOTES
Patients  at pivot desk stating its getting late and they don't want to wait any longer. Risks of leaving explained to . Patient/ verbalizes understanding. AMA signed and charge nurse notified.       Brodie Rodriges, [place]  45/35/63 0393

## 2022-06-01 ENCOUNTER — TELEPHONE (OUTPATIENT)
Dept: PRIMARY CARE CLINIC | Age: 82
End: 2022-06-01

## 2022-06-01 ENCOUNTER — OFFICE VISIT (OUTPATIENT)
Dept: PRIMARY CARE CLINIC | Age: 82
End: 2022-06-01
Payer: MEDICARE

## 2022-06-01 VITALS
SYSTOLIC BLOOD PRESSURE: 138 MMHG | TEMPERATURE: 97 F | DIASTOLIC BLOOD PRESSURE: 83 MMHG | WEIGHT: 151 LBS | BODY MASS INDEX: 26.75 KG/M2

## 2022-06-01 DIAGNOSIS — E03.9 ACQUIRED HYPOTHYROIDISM: ICD-10-CM

## 2022-06-01 DIAGNOSIS — E11.9 DIET-CONTROLLED DIABETES MELLITUS (HCC): ICD-10-CM

## 2022-06-01 DIAGNOSIS — D51.8 VITAMIN B12 DEFICIENCY (DIETARY) ANEMIA: ICD-10-CM

## 2022-06-01 DIAGNOSIS — I10 ESSENTIAL HYPERTENSION: ICD-10-CM

## 2022-06-01 DIAGNOSIS — G45.9 TIA (TRANSIENT ISCHEMIC ATTACK): Chronic | ICD-10-CM

## 2022-06-01 DIAGNOSIS — R41.3 MEMORY IMPAIRMENT: Chronic | ICD-10-CM

## 2022-06-01 DIAGNOSIS — E78.2 MIXED HYPERLIPIDEMIA: Chronic | ICD-10-CM

## 2022-06-01 DIAGNOSIS — M81.0 AGE-RELATED OSTEOPOROSIS WITHOUT CURRENT PATHOLOGICAL FRACTURE: ICD-10-CM

## 2022-06-01 DIAGNOSIS — I10 ESSENTIAL HYPERTENSION: Primary | Chronic | ICD-10-CM

## 2022-06-01 LAB
ALBUMIN SERPL-MCNC: 4.2 G/DL (ref 3.5–5.2)
ALP BLD-CCNC: 80 U/L (ref 35–104)
ALT SERPL-CCNC: 9 U/L (ref 0–32)
ANION GAP SERPL CALCULATED.3IONS-SCNC: 14 MMOL/L (ref 7–16)
AST SERPL-CCNC: 19 U/L (ref 0–31)
BACTERIA: ABNORMAL /HPF
BASOPHILS ABSOLUTE: 0.07 E9/L (ref 0–0.2)
BASOPHILS RELATIVE PERCENT: 1 % (ref 0–2)
BILIRUB SERPL-MCNC: 0.2 MG/DL (ref 0–1.2)
BILIRUBIN URINE: NEGATIVE
BLOOD, URINE: ABNORMAL
BUN BLDV-MCNC: 20 MG/DL (ref 6–23)
CALCIUM SERPL-MCNC: 9.1 MG/DL (ref 8.6–10.2)
CHLORIDE BLD-SCNC: 105 MMOL/L (ref 98–107)
CHOLESTEROL, TOTAL: 184 MG/DL (ref 0–199)
CLARITY: CLEAR
CO2: 23 MMOL/L (ref 22–29)
COLOR: YELLOW
CREAT SERPL-MCNC: 0.8 MG/DL (ref 0.5–1)
EOSINOPHILS ABSOLUTE: 0.2 E9/L (ref 0.05–0.5)
EOSINOPHILS RELATIVE PERCENT: 3 % (ref 0–6)
GFR AFRICAN AMERICAN: >60
GFR NON-AFRICAN AMERICAN: >60 ML/MIN/1.73
GLUCOSE BLD-MCNC: 127 MG/DL (ref 74–99)
GLUCOSE URINE: NEGATIVE MG/DL
HBA1C MFR BLD: 6.2 % (ref 4–5.6)
HCT VFR BLD CALC: 38.4 % (ref 34–48)
HDLC SERPL-MCNC: 45 MG/DL
HEMOGLOBIN: 12.1 G/DL (ref 11.5–15.5)
IMMATURE GRANULOCYTES #: 0.02 E9/L
IMMATURE GRANULOCYTES %: 0.3 % (ref 0–5)
KETONES, URINE: NEGATIVE MG/DL
LDL CHOLESTEROL CALCULATED: 115 MG/DL (ref 0–99)
LEUKOCYTE ESTERASE, URINE: ABNORMAL
LYMPHOCYTES ABSOLUTE: 2.01 E9/L (ref 1.5–4)
LYMPHOCYTES RELATIVE PERCENT: 30 % (ref 20–42)
MCH RBC QN AUTO: 32.3 PG (ref 26–35)
MCHC RBC AUTO-ENTMCNC: 31.5 % (ref 32–34.5)
MCV RBC AUTO: 102.4 FL (ref 80–99.9)
MONOCYTES ABSOLUTE: 0.57 E9/L (ref 0.1–0.95)
MONOCYTES RELATIVE PERCENT: 8.5 % (ref 2–12)
NEUTROPHILS ABSOLUTE: 3.84 E9/L (ref 1.8–7.3)
NEUTROPHILS RELATIVE PERCENT: 57.2 % (ref 43–80)
NITRITE, URINE: NEGATIVE
PDW BLD-RTO: 12.2 FL (ref 11.5–15)
PH UA: 5 (ref 5–9)
PLATELET # BLD: 342 E9/L (ref 130–450)
PMV BLD AUTO: 9.7 FL (ref 7–12)
POTASSIUM SERPL-SCNC: 5.2 MMOL/L (ref 3.5–5)
PROTEIN UA: NEGATIVE MG/DL
RBC # BLD: 3.75 E12/L (ref 3.5–5.5)
RBC UA: ABNORMAL /HPF (ref 0–2)
SODIUM BLD-SCNC: 142 MMOL/L (ref 132–146)
SPECIFIC GRAVITY UA: 1.02 (ref 1–1.03)
T4 TOTAL: 5.5 MCG/DL (ref 4.5–11.7)
TOTAL PROTEIN: 6.6 G/DL (ref 6.4–8.3)
TRIGL SERPL-MCNC: 121 MG/DL (ref 0–149)
TSH SERPL DL<=0.05 MIU/L-ACNC: 1.63 UIU/ML (ref 0.27–4.2)
UROBILINOGEN, URINE: 0.2 E.U./DL
VITAMIN B-12: 412 PG/ML (ref 211–946)
VITAMIN D 25-HYDROXY: 54 NG/ML (ref 30–100)
VLDLC SERPL CALC-MCNC: 24 MG/DL
WBC # BLD: 6.7 E9/L (ref 4.5–11.5)
WBC UA: ABNORMAL /HPF (ref 0–5)

## 2022-06-01 PROCEDURE — 1123F ACP DISCUSS/DSCN MKR DOCD: CPT | Performed by: FAMILY MEDICINE

## 2022-06-01 PROCEDURE — 99214 OFFICE O/P EST MOD 30 MIN: CPT | Performed by: FAMILY MEDICINE

## 2022-06-01 ASSESSMENT — PATIENT HEALTH QUESTIONNAIRE - PHQ9
SUM OF ALL RESPONSES TO PHQ QUESTIONS 1-9: 0
SUM OF ALL RESPONSES TO PHQ9 QUESTIONS 1 & 2: 0
SUM OF ALL RESPONSES TO PHQ QUESTIONS 1-9: 0
1. LITTLE INTEREST OR PLEASURE IN DOING THINGS: 0
SUM OF ALL RESPONSES TO PHQ QUESTIONS 1-9: 0
2. FEELING DOWN, DEPRESSED OR HOPELESS: 0
SUM OF ALL RESPONSES TO PHQ QUESTIONS 1-9: 0

## 2022-06-01 ASSESSMENT — ENCOUNTER SYMPTOMS
GASTROINTESTINAL NEGATIVE: 1
ALLERGIC/IMMUNOLOGIC NEGATIVE: 1
EYES NEGATIVE: 1
RESPIRATORY NEGATIVE: 1

## 2022-06-01 NOTE — PROGRESS NOTES
22  Name: Smita Ulloa    : 1940    Sex: female    Age: 80 y.o. Subjective:  Chief Complaint: Patient is here for 6 week  Ck     bp  Chol    Er    Tia   memory     Feel ok  In room aloen  hus drove    Went toer   With eelv  bp  And  And numb left cheek and left hadn    Left ama   Ct    Neg  prob  Recurrent  tia    Short  Term memory   ppoor      Review of Systems   Constitutional: Negative. HENT: Negative. Eyes: Negative. Respiratory: Negative. Cardiovascular: Negative. Gastrointestinal: Negative. Endocrine: Negative. Polydipsia: left cheek  sx  better. Genitourinary: Negative. Musculoskeletal: Negative. Skin: Negative. Allergic/Immunologic: Negative. Neurological: Negative. Hematological: Negative. Psychiatric/Behavioral: Negative. Current Outpatient Medications:     HYDROcodone-acetaminophen (NORCO) 5-325 MG per tablet, Take 1 tablet by mouth every 6 hours as needed for Pain., Disp: , Rfl:     hydroCHLOROthiazide (HYDRODIURIL) 25 MG tablet, Take 1 tablet by mouth daily, Disp: 90 tablet, Rfl: 3    citalopram (CELEXA) 10 MG tablet, Take 1 tablet by mouth daily, Disp: 90 tablet, Rfl: 3    simvastatin (ZOCOR) 10 MG tablet, Take 1 tablet by mouth daily, Disp: 90 tablet, Rfl: 3    clopidogrel (PLAVIX) 75 MG tablet, Take 1 tablet by mouth daily, Disp: 90 tablet, Rfl: 3    gabapentin (NEURONTIN) 300 MG capsule, Take 1 capsule by mouth 4 times daily for 360 days.  Takes 2 in am and 2 at HS, Disp: 360 capsule, Rfl: 3    carvedilol (COREG) 12.5 MG tablet, Take 1 tablet by mouth 2 times daily (with meals), Disp: 180 tablet, Rfl: 3    memantine (NAMENDA) 10 MG tablet, Take 1 tablet by mouth 2 times daily For memory, Disp: 180 tablet, Rfl: 5    losartan (COZAAR) 50 MG tablet, Take 1 tablet by mouth 2 times daily, Disp: 180 tablet, Rfl: 5    dicyclomine (BENTYL) 10 MG capsule, Take 1 capsule by mouth 4 times daily, Disp: 120 capsule, Rfl: 3   pantoprazole (PROTONIX) 40 MG tablet, TAKE 1 TABLET BY MOUTH ONCE DAILY ( TAKE B-12 1000MG AND D3 2000MG FOR GERD), Disp: , Rfl: 12    vitamin B-12 (CYANOCOBALAMIN) 1000 MCG tablet, Take 1,000 mcg by mouth daily, Disp: , Rfl:     vitamin D (D3-1000) 1000 units CAPS, Take by mouth daily, Disp: , Rfl:   Allergies   Allergen Reactions    Pcn [Penicillins] Rash     Social History     Socioeconomic History    Marital status: Single     Spouse name: Not on file    Number of children: Not on file    Years of education: Not on file    Highest education level: Not on file   Occupational History    Not on file   Tobacco Use    Smoking status: Never Smoker    Smokeless tobacco: Never Used   Vaping Use    Vaping Use: Never used   Substance and Sexual Activity    Alcohol use: Yes     Comment: rare    Drug use: No    Sexual activity: Not Currently   Other Topics Concern    Not on file   Social History Narrative        CATARACTS    TINNITUS    GB OUT     C HYSTER    MOTHER  AT 42 FROM SUICIDE    FH CVA---F  52 GF  FROM CVA AT 46    FH HTN BRO    CATH NEG     OP    TICS    BOYFRIEND NHAN Keller  1940 Page #2    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 BRADLEY---CAVALEIR X RAY    CH--5   Girl struthers   Two boys kinsman    Two out of town    RETIRED [employer] THEN WORKS [employer]    CVA AFFECT L FACIAL SENSATION 2-08    TOMMY LUMBAR One Ariel RIVAS-----1-09    COLON 1-10----NEG BUT PREVIOUS TICS AND POLYP    TIA 1-11    SHINGLES R ABD 2-12    MRI  WITH L-2-3 DISC AND NERVE----DR RIVAS----OR SET UP    ADMIT  WITH TIA----DR Sylvia Gamez DC ASA AND ADDED PLAVIX TO THE PERSANTINE    LUMBAR CHERYL OR DR RIVAS 10-12------------AGAIN DONE D SANDRA RIVAS 16    MOTHER  SUICIDE AGE 43    DAD  AT 52 CVA    FIRST HUS SUICIDE AT 52    X RAY 11=13 WITH NEW L-1 -2 NARROWING AND RETROLISTHESIS----SHOTS WITH DR Brigido Meckel    ANX--DEP 1-14    OA HIPS DR CHARLTON---    DIET DM 4=16    LUMBAR DISC SURGERY  DR RIVAS    LEFT INGUINAL HERNIA WITH MESH DR BUNCH     ATHEROSCLEROIS EVAL DR BROTHERS     EVAL DR ALFONSO [place]--- ADIVSED ENT---PT SAYS HE TOLD HER NOT GET NEBULZIER---AND    REFUSESD FOLLOW UP    T-8 SPINAL CORD STIMULATOR TRIAL 19--------implanted permanent dr Dayne Diaz  ---device moved on buttock       PAIN MEDS WITH DR Arnulfo Jorgensen PAIN [facility]    Son with  Lung ca and brain mets  ----- [place]--  at  62 yrs old    eval dr Ana Saab for dementia       Pain management----------------pain doc monthly    Er   with  elev  bp  and TIA    ct ok and left ama    Move to smaller house        Social Determinants of Health     Financial Resource Strain:     Difficulty of Paying Living Expenses: Not on file   Food Insecurity:     Worried About Running Out of Food in the Last Year: Not on file    Anastasia of Food in the Last Year: Not on file   Transportation Needs:     Lack of Transportation (Medical): Not on file    Lack of Transportation (Non-Medical):  Not on file   Physical Activity: Inactive    Days of Exercise per Week: 0 days    Minutes of Exercise per Session: 0 min   Stress:     Feeling of Stress : Not on file   Social Connections:     Frequency of Communication with Friends and Family: Not on file    Frequency of Social Gatherings with Friends and Family: Not on file    Attends Sabianism Services: Not on file    Active Member of Clubs or Organizations: Not on file    Attends Club or Organization Meetings: Not on file    Marital Status: Not on file   Intimate Partner Violence:     Fear of Current or Ex-Partner: Not on file    Emotionally Abused: Not on file    Physically Abused: Not on file    Sexually Abused: Not on file   Housing Stability:     Unable to Pay for Housing in the Last Year: Not on file    Number of [place] in the Last Year: Not on file    Unstable Housing in the Last Year: Not on file      Past Medical History:   Diagnosis Date    Anxiety     CVA [facility]cerebral vascular accident) ([facility] [address]     L FACIAL SENSATION    Depression     Diverticulitis     Hyperlipidemia     Hypertension     OA (osteoarthritis) of hip     OP (osteoporosis)     Shingles     R ABD    TIA (transient ischemic attack)     Tinnitus     Wears partial dentures     upper     Family History   Problem Relation Age of Onset    Stroke Father       Past Surgical History:   Procedure Laterality Date    BACK SURGERY      x3    CHOLECYSTECTOMY      COLONOSCOPY N/A 8/28/2019    COLONOSCOPY WITH BIOPSY performed by Rae Arguello MD at [address] COLONOSCOPY  8/28/2019    COLONOSCOPY POLYPECTOMY SNARE/COLD BIOPSY performed by Rae Arguello MD at [address] [facility] [address]      kuldip lense implant    HERNIA REPAIR  2018    HYSTERECTOMY      LUMBAR LAMINECTOMY      SPINAL CORD STIMULATOR IMPLANTATION N/A 5/6/2019    T8 SPINAL CORD STIMULATOR  TRIAL PLACEMENT--SUNITHA, KODAK TABLE, BOSTON SCIENTIFIC performed by Margarita Mckeon MD at [address] [place] [address] N/A 5/14/2019    PLACEMENT OF  PERMANENT SPINAL CORD STIMULATOR---XRAY, KODAK TABLE, BOSTON SCIENTIFIC performed by Margarita Mckeon MD at [address] [place] [address] N/A 1/9/2020    REVISION OF SPINAL CORD STIMULATOR  BATTERY AND POCKET performed by Margarita Mckeon MD at [address] N/A 8/28/2019    EGD BIOPSY performed by Rae Arguello MD at [address]:    06/01/22 0803   BP: 138/83   Temp: 97 °F (36.1 °C)   TempSrc: Infrared   Weight: 151 lb (68.5 kg)       Objective:    Physical Exam  Vitals reviewed. Constitutional:       Appearance: She is well-developed. HENT:      Head: Normocephalic. Eyes:      Pupils: Pupils are equal, round, and reactive to light. Cardiovascular:      Rate and Rhythm: Normal rate and regular rhythm. Pulmonary:      Effort: Pulmonary effort is normal.      Breath sounds: Normal breath sounds. Abdominal:      Palpations: Abdomen is soft. Musculoskeletal:         General: Normal range of motion. Cervical back: Normal range of motion. Skin:     General: Skin is warm. Neurological:      Mental Status: She is alert and oriented to person, place, and time. Psychiatric:         Behavior: Behavior normal.         May was seen today for ed follow-up. Diagnoses and all orders for this visit:    Essential hypertension    Mixed hyperlipidemia    TIA (transient ischemic attack)    Memory impairment        Comments:    Shawn if  bp up  Die texer lab today    cotn meds           I educated the patient about all medications. Make sure they were correct and educated  on the risk associated with missing meds or taking them incorrectly. A list of medications is being sent home with patient today. Check blood pressure at home twice a day. Low-salt low caffeine diet. Call if systolic blood pressure is above 380 and diastolic blood pressures above 85. Only use a upper arm digital cuff. Aggressive low-fat diet. Avoid red meats, greasy fried foods, dairy products. Avoid processed foods. Take cholesterol medications without food. I informed patient about the risk associated with noncompliance of medication and taking medications incorrectly. Appropriate follow-up with myself and all specialist.  Encourage family members to take active role in assisting with medications and medical care. If any confusion should develop to notify my office immediately to avoid risk of worsening medical condition    A great deal of time spent reviewing medications, diet, exercise, social issues. Also reviewing the chart before entering the room with patient and finishing charting after leaving patient's room. More than half of that time was spent face to face with the patient in counseling and coordinating care. Follow Up: Return in about 4 months (around 10/1/2022).      Seen by:  Padmini Lobato DO

## 2022-06-01 NOTE — TELEPHONE ENCOUNTER
The daughter Lisa Wong all lab was good. Her average sugar is up just a hair. Cut back on sweets and carbs a little bit.   Regular appointment

## 2022-06-03 ENCOUNTER — APPOINTMENT (OUTPATIENT)
Dept: CT IMAGING | Age: 82
End: 2022-06-03
Payer: MEDICARE

## 2022-06-03 ENCOUNTER — APPOINTMENT (OUTPATIENT)
Dept: GENERAL RADIOLOGY | Age: 82
End: 2022-06-03
Payer: MEDICARE

## 2022-06-03 LAB
ALBUMIN SERPL-MCNC: 4.5 G/DL (ref 3.5–5.2)
ALP BLD-CCNC: 73 U/L (ref 35–104)
ALT SERPL-CCNC: 14 U/L (ref 0–32)
ANION GAP SERPL CALCULATED.3IONS-SCNC: 9 MMOL/L (ref 7–16)
AST SERPL-CCNC: 17 U/L (ref 0–31)
BASOPHILS ABSOLUTE: 0.09 E9/L (ref 0–0.2)
BASOPHILS RELATIVE PERCENT: 1.1 % (ref 0–2)
BILIRUB SERPL-MCNC: 0.3 MG/DL (ref 0–1.2)
BUN BLDV-MCNC: 15 MG/DL (ref 6–23)
CALCIUM SERPL-MCNC: 9 MG/DL (ref 8.6–10.2)
CHLORIDE BLD-SCNC: 103 MMOL/L (ref 98–107)
CO2: 27 MMOL/L (ref 22–29)
CREAT SERPL-MCNC: 0.8 MG/DL (ref 0.5–1)
EOSINOPHILS ABSOLUTE: 0.18 E9/L (ref 0.05–0.5)
EOSINOPHILS RELATIVE PERCENT: 2.2 % (ref 0–6)
GFR AFRICAN AMERICAN: >60
GFR NON-AFRICAN AMERICAN: >60 ML/MIN/1.73
GLUCOSE BLD-MCNC: 109 MG/DL (ref 74–99)
HCT VFR BLD CALC: 37.3 % (ref 34–48)
HEMOGLOBIN: 12.4 G/DL (ref 11.5–15.5)
IMMATURE GRANULOCYTES #: 0.05 E9/L
IMMATURE GRANULOCYTES %: 0.6 % (ref 0–5)
LYMPHOCYTES ABSOLUTE: 3.13 E9/L (ref 1.5–4)
LYMPHOCYTES RELATIVE PERCENT: 37.7 % (ref 20–42)
MCH RBC QN AUTO: 32.3 PG (ref 26–35)
MCHC RBC AUTO-ENTMCNC: 33.2 % (ref 32–34.5)
MCV RBC AUTO: 97.1 FL (ref 80–99.9)
MONOCYTES ABSOLUTE: 0.77 E9/L (ref 0.1–0.95)
MONOCYTES RELATIVE PERCENT: 9.3 % (ref 2–12)
NEUTROPHILS ABSOLUTE: 4.09 E9/L (ref 1.8–7.3)
NEUTROPHILS RELATIVE PERCENT: 49.1 % (ref 43–80)
PDW BLD-RTO: 11.9 FL (ref 11.5–15)
PLATELET # BLD: 341 E9/L (ref 130–450)
PMV BLD AUTO: 9.1 FL (ref 7–12)
POTASSIUM SERPL-SCNC: 3.9 MMOL/L (ref 3.5–5)
RBC # BLD: 3.84 E12/L (ref 3.5–5.5)
SODIUM BLD-SCNC: 139 MMOL/L (ref 132–146)
TOTAL PROTEIN: 6.8 G/DL (ref 6.4–8.3)
TROPONIN, HIGH SENSITIVITY: 9 NG/L (ref 0–9)
WBC # BLD: 8.3 E9/L (ref 4.5–11.5)

## 2022-06-03 PROCEDURE — 6370000000 HC RX 637 (ALT 250 FOR IP): Performed by: PHYSICIAN ASSISTANT

## 2022-06-03 PROCEDURE — 71045 X-RAY EXAM CHEST 1 VIEW: CPT

## 2022-06-03 PROCEDURE — 99285 EMERGENCY DEPT VISIT HI MDM: CPT

## 2022-06-03 PROCEDURE — 84484 ASSAY OF TROPONIN QUANT: CPT

## 2022-06-03 PROCEDURE — 70450 CT HEAD/BRAIN W/O DYE: CPT

## 2022-06-03 PROCEDURE — 80053 COMPREHEN METABOLIC PANEL: CPT

## 2022-06-03 PROCEDURE — 85025 COMPLETE CBC W/AUTO DIFF WBC: CPT

## 2022-06-03 PROCEDURE — 96372 THER/PROPH/DIAG INJ SC/IM: CPT

## 2022-06-03 PROCEDURE — 6370000000 HC RX 637 (ALT 250 FOR IP)

## 2022-06-03 PROCEDURE — 36415 COLL VENOUS BLD VENIPUNCTURE: CPT

## 2022-06-03 RX ORDER — CLONIDINE HYDROCHLORIDE 0.1 MG/1
0.3 TABLET ORAL ONCE
Status: COMPLETED | OUTPATIENT
Start: 2022-06-03 | End: 2022-06-03

## 2022-06-03 RX ORDER — ACETAMINOPHEN 325 MG/1
650 TABLET ORAL ONCE
Status: COMPLETED | OUTPATIENT
Start: 2022-06-03 | End: 2022-06-03

## 2022-06-03 RX ORDER — ACETAMINOPHEN 325 MG/1
TABLET ORAL
Status: COMPLETED
Start: 2022-06-03 | End: 2022-06-03

## 2022-06-03 RX ADMIN — ACETAMINOPHEN 650 MG: 325 TABLET ORAL at 20:27

## 2022-06-03 RX ADMIN — ACETAMINOPHEN 325MG 650 MG: 325 TABLET ORAL at 20:27

## 2022-06-03 RX ADMIN — CLONIDINE HYDROCHLORIDE 0.3 MG: 0.1 TABLET ORAL at 19:46

## 2022-06-03 ASSESSMENT — PAIN SCALES - GENERAL: PAINLEVEL_OUTOF10: 8

## 2022-06-03 ASSESSMENT — PAIN - FUNCTIONAL ASSESSMENT: PAIN_FUNCTIONAL_ASSESSMENT: 0-10

## 2022-06-03 ASSESSMENT — PAIN DESCRIPTION - LOCATION: LOCATION: HEAD

## 2022-06-03 NOTE — ED NOTES
Department of Emergency Medicine  FIRST PROVIDER TRIAGE NOTE             Independent MLP           6/3/22  3:33 PM EDT    Date of Encounter: 6/3/22   MRN: [id number]      HPI: Yang Gunn is a 80 y.o. female who presents to the ED for Hypertension (elevated BP at home, complains of headache, took BP meds today)     Patient is an 80year-old that is presented today with elevated blood pressure at home and complaints of a headache. Patient states she did take her blood pressure medications today. Blood pressure is 223/113. ROS: Negative for abd pain, back pain, fever, cough or vomiting. PE: Gen Appearance/Constitutional: alert  HEENT: NC/NT. PERRLA,  Airway patent. Neck: supple     Initial Plan of Care: All treatment areas with department are currently occupied. Plan to order/Initiate the following while awaiting opening in ED: labs, EKG and imaging studies.   Initiate Treatment-Testing, Proceed toTreatment Area When Bed Available for ED Attending/MLP to Continue Care    Electronically signed by Dionicio Winter PA-C   DD: 6/3/22         Dionicio Winter PA-C  06/03/22 [id number]

## 2022-06-03 NOTE — ED NOTES
Reassessment    Patient has persistent headache and now a tremor of the right arm. Patient denies any chest pain shortness of breath. Has history of hypertension. She states she did take her blood pressure medications today. Patient was seen for similar complaint recently. Repeat blood pressure 217/121. Heart regular rate and rhythm  Lungs clear     Assessment headache/hypertension    Plan clonidine 0.3 mg p.o. Repeat BP 30 minutes     2020 updated on CT results.     Repeat 163/91      SEBASTIÁN Camacho  06/03/22 [address], [place]  06/03/22 2101

## 2022-06-04 ENCOUNTER — HOSPITAL ENCOUNTER (EMERGENCY)
Age: 82
Discharge: HOME | End: 2022-06-04
Attending: STUDENT IN AN ORGANIZED HEALTH CARE EDUCATION/TRAINING PROGRAM
Payer: MEDICARE

## 2022-06-04 VITALS
OXYGEN SATURATION: 100 % | TEMPERATURE: 98.2 F | SYSTOLIC BLOOD PRESSURE: 134 MMHG | HEART RATE: 73 BPM | DIASTOLIC BLOOD PRESSURE: 76 MMHG | RESPIRATION RATE: 16 BRPM

## 2022-06-04 DIAGNOSIS — R20.0 NUMBNESS AND TINGLING OF LEFT SIDE OF FACE: ICD-10-CM

## 2022-06-04 DIAGNOSIS — R20.2 NUMBNESS AND TINGLING OF LEFT SIDE OF FACE: ICD-10-CM

## 2022-06-04 DIAGNOSIS — R51.9 NONINTRACTABLE EPISODIC HEADACHE, UNSPECIFIED HEADACHE TYPE: Primary | ICD-10-CM

## 2022-06-04 DIAGNOSIS — I10 PRIMARY HYPERTENSION: ICD-10-CM

## 2022-06-04 PROCEDURE — 6360000002 HC RX W HCPCS: Performed by: STUDENT IN AN ORGANIZED HEALTH CARE EDUCATION/TRAINING PROGRAM

## 2022-06-04 RX ORDER — PROCHLORPERAZINE MALEATE 10 MG
10 TABLET ORAL ONCE
Status: DISCONTINUED | OUTPATIENT
Start: 2022-06-04 | End: 2022-06-04

## 2022-06-04 RX ORDER — DIPHENHYDRAMINE HCL 25 MG
50 TABLET ORAL ONCE
Status: DISCONTINUED | OUTPATIENT
Start: 2022-06-04 | End: 2022-06-04

## 2022-06-04 RX ORDER — PROCHLORPERAZINE EDISYLATE 5 MG/ML
10 INJECTION INTRAMUSCULAR; INTRAVENOUS ONCE
Status: COMPLETED | OUTPATIENT
Start: 2022-06-04 | End: 2022-06-04

## 2022-06-04 RX ORDER — DIPHENHYDRAMINE HYDROCHLORIDE 50 MG/ML
25 INJECTION INTRAMUSCULAR; INTRAVENOUS ONCE
Status: COMPLETED | OUTPATIENT
Start: 2022-06-04 | End: 2022-06-04

## 2022-06-04 RX ADMIN — PROCHLORPERAZINE EDISYLATE 10 MG: 5 INJECTION INTRAMUSCULAR; INTRAVENOUS at 06:20

## 2022-06-04 RX ADMIN — DIPHENHYDRAMINE HYDROCHLORIDE 25 MG: 50 INJECTION, SOLUTION INTRAMUSCULAR; INTRAVENOUS at 06:20

## 2022-06-04 ASSESSMENT — PAIN - FUNCTIONAL ASSESSMENT: PAIN_FUNCTIONAL_ASSESSMENT: NONE - DENIES PAIN

## 2022-06-04 NOTE — ED NOTES
Patient sitting in wheelchair in waiting area, with eyes closed, vitals retaken, patient family member updated on room status and another warm blanket provided to patient no s/sx of distress, zero complaints of pain or discomfort at this time. Will continue to monitor.      Keven Benjamin LPN  47/16/63 [id number]

## 2022-06-04 NOTE — ED PROVIDER NOTES
ED  Provider Note  Admit Date/RoomTime: 6/4/2022  4:24 AM  ED Room[facility]      History of Present Illness:  6/4/22, Time: 5:44 AM EDT  Chief Complaint   Patient presents with    Hypertension     elevated BP at home, complains of headache, took BP meds today         Kirstie Porter is a 80 y.o. female presenting to the ED for headache and facial numbness. Patient states she has had facial numbness since last Saturday when she was seen in the emergency department, it has not progressed, if anything it has improved somewhat. She has no focal weakness. She does have a mild headache without vision changes, she is concerned that her blood pressure was elevated. Patient saw her primary doctor and by my documentation review she was having the same symptoms at that time as well. No falls or trauma. No dizziness or lightheadedness. No chest pain or shortness of breath, no neck pain or stiffness, no nausea vomiting diarrhea or constipation. Gradual onset, not maximal at onset headache, no neck pain or stiffness associated with a headache, no nausea or vomiting. No meds prior to arrival.  Exacerbated by none. NIHSS 0     Review of Systems:   A complete review of systems was performed and pertinent positives and negatives are stated within HPI, all other systems reviewed and are negative.        --------------------------------------------- PATIENT HISTORY--------------------------------------------  Past Medical History:  has a past medical history of Anxiety, CVA (cerebral vascular accident) ([facility] [address] Depression, Diverticulitis, Hyperlipidemia, Hypertension, OA (osteoarthritis) of hip, OP (osteoporosis), Shingles, TIA (transient ischemic attack), Tinnitus, and Wears partial dentures. Past Surgical History:  has a past surgical history that includes back surgery; Cholecystectomy;  Hysterectomy; hernia repair (2018); spinal cord stimulator implantation (N/A, 5/6/2019); spinal cord stimulator implantation (N/A, 5/14/2019); Colonoscopy (N/A, 8/28/2019); Upper gastrointestinal endoscopy (N/A, 8/28/2019); Colonoscopy (8/28/2019); lumbar laminectomy; eye surgery; and spinal cord stimulator implantation (N/A, 1/9/2020). Family History: family history includes Stroke in her father. . Unless otherwise noted, family history is non contributory    Social History:  reports that she has never smoked. She has never used smokeless tobacco. She reports current alcohol use. She reports that she does not use drugs. The patients home medications have been reviewed. Allergies: Pcn [penicillins]    I have reviewed the past medical history, past surgical history, social history, and family history    ---------------------------------------------------PHYSICAL EXAM--------------------------------------    Constitutional/General: Alert and oriented x3  Head: Normocephalic and atraumatic  Eyes: PERRL, EOMI, sclera non icteric  ENT: Oropharynx clear, handling secretions, no trismus  Neck: Supple, full ROM, no stridor, no meningismus  Respiratory: Lungs clear to auscultation bilaterally  Cardiovascular: RRR, no R/G/M, 2+ peripheral pulses  Chest: No chest wall tenderness, equal chest rise  Gastrointestinal: Nontender nondistended  Musculoskeletal: Extremities warm and well perfused, moving all extremities  Skin: skin warm and dry. No rashes.    Neurologic: CN II-XII intact   No facial droop/asymmetry   No dysarthria   No aphasia  No dysmetria on finger-to-nose testing   RUE: 5/5 strength shoulder abduction and abduction, 5/5 elbow flexion and extension, 5/5 wrist flexion and extension, sensation intact  LUE: 5/5 strength shoulder abduction and abduction, 5/5 elbow flexion and extension, 5/5 wrist flexion and extension, sensation intact  RLE: 5 out of 5 strength with straight leg raise, 5 out of 5 strength knee flexion and extension, 5 out of 5 strength ankle dorsiflexion and plantarflexion, sensation intact  LLE: 5 out of 5 strength with straight leg raise, 5 out of 5 strength knee flexion and extension, 5 out of 5 strength ankle dorsiflexion and plantarflexion, sensation intact  Psychiatric: Normal Affect, behavior normal           -------------------------------------------------- RESULTS -------------------------------------------------  I have personally reviewed all laboratory and imaging results for this patient. Results are listed below.      LABS: (Lab results interpreted by me)  Results for orders placed or performed during the hospital encounter of 06/04/22   CBC with Auto Differential   Result Value Ref Range    WBC 8.3 4.5 - 11.5 E9/L    RBC 3.84 3.50 - 5.50 E12/L    Hemoglobin 12.4 11.5 - 15.5 g/dL    Hematocrit 37.3 34.0 - 48.0 %    MCV 97.1 80.0 - 99.9 fL    MCH 32.3 26.0 - 35.0 pg    MCHC 33.2 32.0 - 34.5 %    RDW 11.9 11.5 - 15.0 fL    Platelets 985 814 - 178 E9/L    MPV 9.1 7.0 - 12.0 fL    Neutrophils % 49.1 43.0 - 80.0 %    Immature Granulocytes % 0.6 0.0 - 5.0 %    Lymphocytes % 37.7 20.0 - 42.0 %    Monocytes % 9.3 2.0 - 12.0 %    Eosinophils % 2.2 0.0 - 6.0 %    Basophils % 1.1 0.0 - 2.0 %    Neutrophils Absolute 4.09 1.80 - 7.30 E9/L    Immature Granulocytes # 0.05 E9/L    Lymphocytes Absolute 3.13 1.50 - 4.00 E9/L    Monocytes Absolute 0.77 0.10 - 0.95 E9/L    Eosinophils Absolute 0.18 0.05 - 0.50 E9/L    Basophils Absolute 0.09 0.00 - 0.20 E9/L   Comprehensive Metabolic Panel   Result Value Ref Range    Sodium 139 132 - 146 mmol/L    Potassium 3.9 3.5 - 5.0 mmol/L    Chloride 103 98 - 107 mmol/L    CO2 27 22 - 29 mmol/L    Anion Gap 9 7 - 16 mmol/L    Glucose 109 (H) 74 - 99 mg/dL    BUN 15 6 - 23 mg/dL    CREATININE 0.8 0.5 - 1.0 mg/dL    GFR Non-African American >60 >=60 mL/min/1.73    GFR African American >60     Calcium 9.0 8.6 - 10.2 mg/dL    Total Protein 6.8 6.4 - 8.3 g/dL    Albumin 4.5 3.5 - 5.2 g/dL    Total Bilirubin 0.3 0.0 - 1.2 mg/dL    Alkaline Phosphatase 73 35 - 104 U/L    ALT 14 0 - 32 U/L    AST 17 0 - 31 U/L   Troponin   Result Value Ref Range    Troponin, High Sensitivity 9 0 - 9 ng/L   ,       RADIOLOGY:  Imaging interpreted by Radiologist unless otherwise specified  CT HEAD WO CONTRAST   Final Result   No acute intracranial hemorrhage or edema. XR CHEST 1 VIEW   Final Result   No acute process. ------------------------- NURSING NOTES AND VITALS REVIEWED ---------------------------  The nursing notes within the ED encounter and vital signs as below have been reviewed by myself  /76   Pulse 73   Temp 98.2 °F (36.8 °C) (Oral)   Resp 16   SpO2 100%      The patients available past medical records and past encounters were reviewed. ------------------------------ ED COURSE/MEDICAL DECISION MAKING----------------------  Medications   cloNIDine (CATAPRES) tablet 0.3 mg (0.3 mg Oral Given 6/3/22 1946)   acetaminophen (TYLENOL) tablet 650 mg (650 mg Oral Given 6/3/22 2027)   diphenhydrAMINE (BENADRYL) injection 25 mg (25 mg IntraMUSCular Given 6/4/22 0620)   prochlorperazine (COMPAZINE) injection 10 mg (10 mg IntraMUSCular Given 6/4/22 0620)       IDr. Ash, am the primary provider of record    Medical Decision Making:   Facial numbness and headache, stable from Saturday. No new symptoms or progression of symptoms. CT head negative for acute pathology. NIHSS 0. Patient already on plavix. Discussed admission and patient would prefer homegoing. She is aware that her symptoms could possibly represent TIA, and return precautions given                ED Counseling: This emergency provider has spoken with the patient and any family present to discuss clinical status, results, plan of care, diagnosis and prognosis as able to be determined at this time. Any questions were answered and patient and/or family/POA are agreeable with the plan.       --------------------------------- IMPRESSION AND DISPOSITION ---------------------------------    IMPRESSION  1.  Nonintractable episodic headache, unspecified headache type    2. Primary hypertension    3. Numbness and tingling of left side of face        DISPOSITION  Disposition: Discharge to home  Patient condition is good      This report was transcribed using voice recognition software. Every effort was made to ensure accuracy; however, transcription errors may be present.          Elly Boss MD  06/05/22 5216

## 2022-06-07 ENCOUNTER — TELEPHONE (OUTPATIENT)
Dept: PRIMARY CARE CLINIC | Age: 82
End: 2022-06-07

## 2022-06-07 NOTE — TELEPHONE ENCOUNTER
Notify the daughter. All lab okay. Average sugar just a little bit higher. Still on the diet-controlled diabetic range. Cutting back on sugars and carbs will help.   Regular appointment in October as we set up

## 2022-06-10 LAB
EKG ATRIAL RATE: 76 BPM
EKG P AXIS: 53 DEGREES
EKG P-R INTERVAL: 156 MS
EKG Q-T INTERVAL: 382 MS
EKG QRS DURATION: 84 MS
EKG QTC CALCULATION (BAZETT): 429 MS
EKG R AXIS: 43 DEGREES
EKG T AXIS: 24 DEGREES
EKG VENTRICULAR RATE: 76 BPM

## 2022-08-18 DIAGNOSIS — E78.2 MIXED HYPERLIPIDEMIA: Chronic | ICD-10-CM

## 2022-08-18 RX ORDER — SIMVASTATIN 10 MG
10 TABLET ORAL DAILY
Qty: 90 TABLET | Refills: 3 | Status: SHIPPED | OUTPATIENT
Start: 2022-08-18

## 2022-08-18 NOTE — TELEPHONE ENCOUNTER
Uriel Colunga, DO  - simvastatin refill request  - LOV 6/1/22    Thank you,  Mary Elmore, PharmD, [address] Pharmacist  Department: [phone]  ==================================================  POPULATION [facility] CLINICAL PHARMACY: [facility] REVIEW  Identified care gap per [place]: fills at [id number] [facility] Visit: 6/1/22    Patient not found in [employer] [facility] Dash Records claims through 8/15/22     ACE/ARB ADHERENCE    (YTD Cody Gordon = Filled only once; Potential Fail Date: 8/29/22 ):   LOSARTAN POTASSIUM 50 MG TAB due to refill on 7/7/22. Last filled 90 day supply. BP Readings from Last 3 Encounters:   06/04/22 134/76   06/01/22 138/83   04/20/22 128/83     Estimated Creatinine Clearance: 50 mL/min (based on SCr of 0.8 mg/dL). STATIN [facility]    Simvastatin not on anthem report    Lab Results   Component Value Date    CHOL 184 06/01/2022    TRIG 121 06/01/2022    HDL 45 06/01/2022    LDLCALC 115 (H) 06/01/2022     ALT   Date Value Ref Range Status   06/03/2022 14 0 - 32 U/L Final     AST   Date Value Ref Range Status   06/03/2022 17 0 - 31 U/L Final     The ASCVD Risk score (Rachel Paula, et al., 2013) failed to calculate for the following reasons: The 2013 ASCVD risk score is only valid for ages 36 to 78     PLAN  The following are interventions that have been identified:   - Patient overdue refilling losartan 50 mg and active on home medication list.   Simvastatin not on anthem report    Losartan 50 mg prescribed twice a day- is patient taking twice daily? Simvastatin 10 mg not on anthem report. Last filled 9/2021 per med rec disp report-     Per [facility] pharmacy- Simvastatin 10 mg LF 1/12/21, new script was on hold and never filled so need new script for simvastatin. Can refill losartan 50 mg    Attempting to reach patient to review. Left message asking for return call.     Future Appointments   Date Time Provider Kayode Deanna   10/5/2022  9:15 AM Scott Garcia [address], DO [facility]       Andrés Valenzuela, PharmD, [address] Pharmacist  Department: [address] in place:  No  Recommendation Provided To: Provider: 1 via Note to Provider and Pharmacy: 1  Intervention Detail: New Rx: 1, reason: Improve Adherence and Refill(s) Provided  Gap Closed?: No   Intervention Accepted By: Provider: 1 and Pharmacy: 1  Time Spent (min): 20

## 2022-09-13 RX ORDER — GABAPENTIN 300 MG/1
300 CAPSULE ORAL 4 TIMES DAILY
Qty: 360 CAPSULE | Refills: 3 | Status: SHIPPED | OUTPATIENT
Start: 2022-09-13 | End: 2023-09-08

## 2022-10-03 ENCOUNTER — OFFICE VISIT (OUTPATIENT)
Dept: PAIN MANAGEMENT | Age: 82
End: 2022-10-03
Payer: MEDICARE

## 2022-10-03 VITALS
HEART RATE: 70 BPM | DIASTOLIC BLOOD PRESSURE: 92 MMHG | BODY MASS INDEX: 25.78 KG/M2 | TEMPERATURE: 96.8 F | SYSTOLIC BLOOD PRESSURE: 132 MMHG | OXYGEN SATURATION: 97 % | HEIGHT: 64 IN | WEIGHT: 151 LBS

## 2022-10-03 DIAGNOSIS — M54.16 LUMBAR RADICULOPATHY: ICD-10-CM

## 2022-10-03 DIAGNOSIS — M96.1 LUMBAR POST-LAMINECTOMY SYNDROME: ICD-10-CM

## 2022-10-03 DIAGNOSIS — M54.41 CHRONIC BILATERAL LOW BACK PAIN WITH RIGHT-SIDED SCIATICA: ICD-10-CM

## 2022-10-03 DIAGNOSIS — G89.4 CHRONIC PAIN SYNDROME: Primary | Chronic | ICD-10-CM

## 2022-10-03 DIAGNOSIS — G89.29 CHRONIC BILATERAL LOW BACK PAIN WITH RIGHT-SIDED SCIATICA: ICD-10-CM

## 2022-10-03 DIAGNOSIS — G89.4 CHRONIC PAIN SYNDROME: ICD-10-CM

## 2022-10-03 PROCEDURE — 1123F ACP DISCUSS/DSCN MKR DOCD: CPT | Performed by: PHYSICIAN ASSISTANT

## 2022-10-03 PROCEDURE — 99213 OFFICE O/P EST LOW 20 MIN: CPT | Performed by: PHYSICIAN ASSISTANT

## 2022-10-03 RX ORDER — HYDROCODONE BITARTRATE AND ACETAMINOPHEN 5; 325 MG/1; MG/1
1 TABLET ORAL DAILY PRN
Qty: 15 TABLET | Refills: 0 | Status: SHIPPED | OUTPATIENT
Start: 2022-10-03 | End: 2022-10-18

## 2022-10-03 NOTE — PROGRESS NOTES
Do you currently have any of the following:    Fever: No  Headache:  No  Cough: No  Shortness of breath: No  Exposed to anyone with these symptoms: No                                                                                                                Katrin Belle presents to the [facility] on 10/3/2022. Inell Hashimoto is complaining of pain in back and right leg. The pain is constant. The pain is described as nagging. Pain is rated on her best day at a 7, on her worst day at a 8, and on average at a 8 on the VAS scale. She took her last dose of Gabapentin today. Inell Hashimoto does not have issues with constipation. Any procedures since your last visit: No.    She is not on NSAIDS and  is  on anticoagulation medications to include Plavix and is managed by Mikhail Humphrey DO  . Pacemaker or defibrillator: No.    Medication Contract and Consent for Opioid Use Documents Filed       Patient Documents       Type of Document Status Date Received Received By Description    Medication Contract Received 12/18/2018  8:06 AM DAFNE MALONE PAIN MANAGEMENT PT AGREEMENT 12/18/2018    Medication Contract Received 5/4/2019 10:42 AM Jonathan Parkinson Neurosurgery medication contract    Medication Contract Received 10/8/2020  2:56 PM Vaughn Pearce 2020/010/08 paina greement     Medication Contract Received 11/23/2021 10:53 AM JADA DAVIS MEDICATION CONTRACT                       BP (!) 132/92   Pulse 70   Temp 96.8 °F (36 °C) (Infrared)   Ht 5' 3.5\" (1.613 m)   Wt 151 lb (68.5 kg)   SpO2 97%   BMI 26.33 kg/m²      No LMP recorded. Patient has had a hysterectomy.

## 2022-10-03 NOTE — PROGRESS NOTES
[address]  [address]  [facility]    Follow up Note      Alok Herrmann     Date of Visit:  10/3/2022    CC:  Patient presents for follow up   Chief Complaint   Patient presents with    Back Pain       HPI:    Pain is worse to right foot. States that she is using the SCS but it does not seem to be helping anymore. Appropriate analgesia with current medications regimen: n/a  Change in quality of symptoms: no   Medication side effects:none. Recent diagnostic testing:none. Recent interventional procedures:none. She has been on anticoagulation medications to include Plavix. The patient  has not been on herbal supplements. The patient is not diabetic. Imagin2021 Lumbar Spine X-ray    FINDINGS:   Dextroscoliosis of the thoracic spine. Redemonstration of posterior fusion   hardware extending from lower thoracic spine to level of S1. There are   multiple pedicle screws and 2 rods. Posterior fusion hardware appears   intact. Multilevel laminectomy at level of the lumbar spine. Redemonstration of retrolisthesis of L1 on L2 of approximately 1.3 cm   appearing similar since prior. Spinal stimulator leads are present with   distal aspect of leads at level of level of T8. Impression   1. Stable alignment status multilevel fusion with hardware extending from   lower thoracic spine to level of S1 with multiple pedicle screws and two   rods. Hardware appears intact. 2.  Multilevel laminectomy at level of lumbar spine. 3.  Stable retrolisthesis of L1 on L2 of approximately 1.3 cm.       4.  No evidence of acute fracture. CT follow-up may be helpful for further   evaluation given generalized osteopenia. 2021 Thoracic Spine X-ray    FINDINGS:   Dextroscoliosis of the thoracic spine. Redemonstration of posterior fusion   hardware extending from lower thoracic spine to level of S1.   There are   multiple pedicle screws and 2 rods.  Posterior fusion hardware appears   intact. Multilevel laminectomy at level of the lumbar spine. Redemonstration of retrolisthesis of L1 on L2 of approximately 1.3 cm   appearing similar since prior. Spinal stimulator leads are present with   distal aspect of leads at level of level of T8. Impression   1. Stable alignment status multilevel fusion with hardware extending from   lower thoracic spine to level of S1 with multiple pedicle screws and two   rods. Hardware appears intact. 2.  Multilevel laminectomy at level of lumbar spine. 3.  Stable retrolisthesis of L1 on L2 of approximately 1.3 cm.       4.  No evidence of acute fracture. CT follow-up may be helpful for further   evaluation given generalized osteopenia. Lumbar F/E films 2018 -   Posterior pedicle screw plates extend from W03 vertebral level to the   sacrum. Bony demineralization. Previous discectomy with loss of disc   space L1-2. Retrolisthesis L1-2 and anterolisthesis L4-5 S1. Multilevel disc space narrowing. No acute osseous finding. No   instability on flexion-extension views. Lumbar MRI 2018 -   Posterior pedicle screw plates extend from U88 vertebral level to the   sacrum. Bony demineralization. Previous discectomy with loss of disc   space L1-2. Retrolisthesis L1-2 and anterolisthesis L4-5 S1. Multilevel disc space narrowing. No acute osseous finding. No   instability on flexion-extension views. Lumbar MRI 2016 -                   Potential Aberrant Drug-Related Behavior: no      Urine Drug Screenin2019 + hydrocodone and tramadol  2019 consistent  10/2019 consistent for hydrocodone and metabolite  2020 Consistent  2021 Consistent  2021 Consistent for gabapentin. Negative for norco but she had not had a prescription for months.        OARRS report:  2019 consistent to 2019 consistent (post op medication s/p surgery)  2020 consistent to 10/2022 consistent     Opioid Agreement:  11/23/2021    Past Medical History:   Diagnosis Date    Anxiety     CVA (cerebral vascular accident) ([facility] [address]     L FACIAL SENSATION    Depression     Diverticulitis     Hyperlipidemia     Hypertension     OA (osteoarthritis) of hip     OP (osteoporosis)     Shingles     R ABD    TIA (transient ischemic attack)     Tinnitus     Wears partial dentures     upper       Past Surgical History:   Procedure Laterality Date    BACK SURGERY      x3    CHOLECYSTECTOMY      COLONOSCOPY N/A 8/28/2019    COLONOSCOPY WITH BIOPSY performed by Katy Perez MD at [address]  8/28/2019    COLONOSCOPY POLYPECTOMY SNARE/COLD BIOPSY performed by Katy Perez MD at [address]      kuldip lense implant    HERNIA REPAIR  2018    HYSTERECTOMY (CERVIX STATUS UNKNOWN)      LUMBAR LAMINECTOMY      SPINAL CORD STIMULATOR IMPLANTATION N/A 5/6/2019    T8 SPINAL CORD STIMULATOR  TRIAL PLACEMENT--SUNITHA, [employer], [email or web address] performed by Otto Farooq MD at [employer] 5/14/2019    PLACEMENT OF  PERMANENT SPINAL CORD STIMULATOR---XRAY, [employer], [email or web address] performed by Otto Farooq MD at [employer] N/A 1/9/2020    REVISION OF SPINAL CORD STIMULATOR  BATTERY AND POCKET performed by Otto Farooq MD at [address] N/A 8/28/2019    EGD BIOPSY performed by Katy Perez MD at [address] [facility]       Prior to Admission medications    Medication Sig Start Date End Date Taking? Authorizing Provider   HYDROcodone-acetaminophen (NORCO) 5-325 MG per tablet Take 1 tablet by mouth daily as needed for Pain for up to 15 days. 10/3/22 10/18/22 Yes SEBASTIÁN Cheng   gabapentin (NEURONTIN) 300 MG capsule Take 1 capsule by mouth 4 times daily for 360 days.  9/13/22 9/8/23 Yes Uriel Colunga DO   simvastatin (ZOCOR) 10 MG tablet Take 1 tablet by mouth daily 8/18/22 Yes Uriel Colunga, DO   hydroCHLOROthiazide (HYDRODIURIL) 25 MG tablet Take 1 tablet by mouth daily 21  Yes Uriel Colunga DO   citalopram (CELEXA) 10 MG tablet Take 1 tablet by mouth daily 21  Yes Uriel Colunga DO   clopidogrel (PLAVIX) 75 MG tablet Take 1 tablet by mouth daily 21  Yes Uriel Colunga DO   carvedilol (COREG) 12.5 MG tablet Take 1 tablet by mouth 2 times daily (with meals) 21  Yes Uriel Colunga DO   memantine (NAMENDA) 10 MG tablet Take 1 tablet by mouth 2 times daily For memory 21  Yes Dada Hilarykemar Colunga DO   losartan (COZAAR) 50 MG tablet Take 1 tablet by mouth 2 times daily 21  Yes Uriel Colunga DO       Allergies   Allergen Reactions    Pcn [Penicillins] Rash       Social History     Socioeconomic History    Marital status: Single     Spouse name: Not on file    Number of children: Not on file    Years of education: Not on file    Highest education level: Not on file   Occupational History    Not on file   Tobacco Use    Smoking status: Never    Smokeless tobacco: Never   Vaping Use    Vaping Use: Never used   Substance and Sexual Activity    Alcohol use: Yes     Comment: rare    Drug use: No    Sexual activity: Not Currently   Other Topics Concern    Not on file   Social History Narrative        CATARACTS    TINNITUS    GB OUT     C [address] AT 43 FROM SUICIDE     CVA---F  52 GF  FROM CVA AT 46    FH HTN BRO    CATH NEG     OP    TICS    BOYFRIEND NHAN Franks Semen  1940 Page #2    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 BRADLEY---[employer] X RAY    CH--5   Girl struthers   Two boys kinsman    Two out of town    RETIRED [employer] THEN WORKS [employer]    CVA AFFECT L FACIAL SENSATION 2-08    TOMMY LUMBAR One Ariel RIVAS-----1-09    COLON 1-10----NEG BUT PREVIOUS TICS AND POLYP    TIA 1-11    SHINGLES R ABD 2-12    MRI  WITH L-2-3 DISC AND NERVE----DR RIVAS----OR SET UP    ADMIT  WITH TIA----DR Jeb Padgett DC ASA AND ADDED PLAVIX TO THE PERSANTINE    LUMBAR CHERYL OR DR RIVAS 10-12------------AGAIN DONE SEAMUS RIVAS     MOTHER  SUICIDE AGE 43    DAD  AT 52 CVA    FIRST HUS SUICIDE AT 46    X RAY 11=13 WITH NEW L-1 -2 NARROWING AND RETROLISTHESIS----SHOTS WITH DR Brandon Jang    ANX--DEP 1-14    OA HIPS DR CHARLTON---    DIET DM 4=16    LUMBAR DISC SURGERY -16 DR RIVAS    LEFT INGUINAL HERNIA WITH MESH DR BUNCH     ATHEROSCLEROIS EVAL DR BROTHERS     EVAL DR ALFONSO NEWMAN--- ADIVSED ENT---PT SAYS HE TOLD HER NOT GET NEBULZIER---AND    REFUSESD FOLLOW UP    T-8 SPINAL CORD STIMULATOR TRIAL 19--------implanted permanent dr Danielle Paula  ---device moved on buttock   1-20    PAIN MEDS WITH DR Mignon Malone PAIN [facility]    Son with  Lung ca and brain mets  [facility]  at  62 yrs old    eval dr Lindsey Avina for dementia       Pain management----------------pain doc monthly    Er  - with  elev  bp  and TIA    ct ok and left ama    Move to smaller house        Social Determinants of Health     Financial Resource Strain: Not on file   Food Insecurity: Not on file   Transportation Needs: Not on file   Physical Activity: Inactive    Days of Exercise per Week: 0 days    Minutes of Exercise per Session: 0 min   Stress: Not on file   Social Connections: Not on file   Intimate Partner Violence: Not on file   Housing Stability: Not on file       Family History   Problem Relation Age of Onset    Stroke Father        REVIEW OF SYSTEMS:     Pertamika Arevalo denies fever/chills, chest pain, shortness of breath, new bowel or bladder complaints. All other review of systems was negative. PHYSICAL EXAMINATION:      BP (!) 132/92   Pulse 70   Temp 96.8 °F (36 °C) (Infrared)   Ht 5' 3.5\" (1.613 m)   Wt 151 lb (68.5 kg)   SpO2 97%   BMI 26.33 kg/m²     General:      General appearance:   pleasant and well-hydrated.    , in no discomfort and A & O x3  Build:Normal Weight  Function:Rises from a seated position with difficulty    HEENT:    Head:normocephalic and atraumatic  Pupils:regular, round and equal.  Sclera: icterus absent,    Lungs:    Breathing:Normal expansion. No wheezing. Abdomen:    Shape:non-distended and normal    Lumbar spine:    Range of motion:abnormal mildly Lateral bending, flexion, extension rotation bilateral and is painful       Extremities:    Tremors:None bilaterally upper and lower  Range of motion:Generally normal shoulders, pain with internal rotation of hips not done. Intact:Yes  Varicose veins:absent   Cyanosis:none  Edema:Normal    Neurological:    Motor:   [address]    Dermatology:    Skin:no unusual rashes, no skin lesions, no palpable subcutaneous nodules and good skin turgor    Impression:    LBP and RLE pain in L5 distribution with diffuse weakness of the LLE  Multiple prior lumbar fusion surgeries with Dr. George Norwood, most recent 9/2016 extending her fusion from T10-sacrum  On PLAVIX and ASA for Hx TIA's/CVA's  Has failed multiple surgeries, injections, PT, and medications  Had staged trial with Dr. Deni Shannon 5/6/2019 and 5/14/2019, has had some improvement in her chronic pain thus far but does need a reprogramming, prior reprogramming has been helpful until she seems to lose the stimulation in the needed areas. Patient is s/p:  OPERATIONS PERFORMED:               1.  Exploration of left gluteal pocket for spinal cord stimulator                implantable pulse generator. 2.  Revision of the pocket. 3.  Replacement of implantable pulse generator and new pocket     on  01/09/2020. OARRS reviewed 10/2022  Right foot - call rep regarding coverage. She was on norco but has not had a script filled in about a year. Short course norco 5/325 #15 ordered until her SCS is functioning properly. Patient would like to hold off on injections at this time.    Continue gabapentin as currently prescribed from outside provider, TID  Compounding pain cream - ineffective  Aquatic PT - on hold at this time. Patient encouraged to stay active  Treatment plan discussed with the patient including medication side    effects. Controlled Substance Monitoring:    Acute and Chronic Pain Monitoring:   RX Monitoring 10/3/2022   Attestation -   Periodic Controlled Substance Monitoring Possible medication side effects, risk of tolerance/dependence & alternative treatments discussed. ;No signs of potential drug abuse or diversion identified. ;Assessed functional status. We discussed with the patient that combining opioids, benzodiazepines, alcohol, illicit drugs or sleep aids increases the risk of respiratory depression including death. We discussed that these medications may cause drowsiness, sedation or dizziness and have counseled the patient not to drive or operate machinery. We have discussed that these medications will be prescribed only by one provider. We have discussed with the patient about age related risk factors and have thoroughly discussed the importance of taking these medications as prescribed. The patient verbalizes understanding.

## 2022-11-02 RX ORDER — HYDROCHLOROTHIAZIDE 25 MG/1
25 TABLET ORAL DAILY
Qty: 90 TABLET | Refills: 3 | Status: SHIPPED
Start: 2022-11-02 | End: 2022-11-09

## 2022-11-07 ENCOUNTER — HOSPITAL ENCOUNTER (EMERGENCY)
Age: 82
Discharge: HOME | End: 2022-11-07
Attending: EMERGENCY MEDICINE
Payer: MEDICARE

## 2022-11-07 ENCOUNTER — APPOINTMENT (OUTPATIENT)
Dept: CT IMAGING | Age: 82
End: 2022-11-07
Payer: MEDICARE

## 2022-11-07 ENCOUNTER — APPOINTMENT (OUTPATIENT)
Dept: GENERAL RADIOLOGY | Age: 82
End: 2022-11-07
Payer: MEDICARE

## 2022-11-07 VITALS
BODY MASS INDEX: 26.33 KG/M2 | WEIGHT: 151 LBS | OXYGEN SATURATION: 98 % | DIASTOLIC BLOOD PRESSURE: 78 MMHG | HEART RATE: 80 BPM | TEMPERATURE: 97.5 F | RESPIRATION RATE: 18 BRPM | SYSTOLIC BLOOD PRESSURE: 162 MMHG

## 2022-11-07 DIAGNOSIS — I10 ESSENTIAL HYPERTENSION: ICD-10-CM

## 2022-11-07 DIAGNOSIS — R20.2 PARESTHESIA: Primary | ICD-10-CM

## 2022-11-07 DIAGNOSIS — R60.9 PERIPHERAL EDEMA: ICD-10-CM

## 2022-11-07 LAB
ANION GAP SERPL CALCULATED.3IONS-SCNC: 10 MMOL/L (ref 7–16)
BUN BLDV-MCNC: 13 MG/DL (ref 6–23)
CALCIUM SERPL-MCNC: 9.3 MG/DL (ref 8.6–10.2)
CHLORIDE BLD-SCNC: 97 MMOL/L (ref 98–107)
CO2: 29 MMOL/L (ref 22–29)
CREAT SERPL-MCNC: 0.7 MG/DL (ref 0.5–1)
GFR SERPL CREATININE-BSD FRML MDRD: >60 ML/MIN/1.73
GLUCOSE BLD-MCNC: 128 MG/DL (ref 74–99)
HCT VFR BLD CALC: 35.3 % (ref 34–48)
HEMOGLOBIN: 11.4 G/DL (ref 11.5–15.5)
MCH RBC QN AUTO: 31.3 PG (ref 26–35)
MCHC RBC AUTO-ENTMCNC: 32.3 % (ref 32–34.5)
MCV RBC AUTO: 97 FL (ref 80–99.9)
PDW BLD-RTO: 11.9 FL (ref 11.5–15)
PLATELET # BLD: 385 E9/L (ref 130–450)
PMV BLD AUTO: 9 FL (ref 7–12)
POTASSIUM SERPL-SCNC: 4.2 MMOL/L (ref 3.5–5)
PRO-BNP: 412 PG/ML (ref 0–450)
RBC # BLD: 3.64 E12/L (ref 3.5–5.5)
SODIUM BLD-SCNC: 136 MMOL/L (ref 132–146)
TROPONIN, HIGH SENSITIVITY: 11 NG/L (ref 0–9)
WBC # BLD: 10 E9/L (ref 4.5–11.5)

## 2022-11-07 PROCEDURE — 93005 ELECTROCARDIOGRAM TRACING: CPT

## 2022-11-07 PROCEDURE — 70450 CT HEAD/BRAIN W/O DYE: CPT

## 2022-11-07 PROCEDURE — 99285 EMERGENCY DEPT VISIT HI MDM: CPT

## 2022-11-07 PROCEDURE — 80048 BASIC METABOLIC PNL TOTAL CA: CPT

## 2022-11-07 PROCEDURE — 84484 ASSAY OF TROPONIN QUANT: CPT

## 2022-11-07 PROCEDURE — 85027 COMPLETE CBC AUTOMATED: CPT

## 2022-11-07 PROCEDURE — 83880 ASSAY OF NATRIURETIC PEPTIDE: CPT

## 2022-11-07 PROCEDURE — 71046 X-RAY EXAM CHEST 2 VIEWS: CPT

## 2022-11-07 PROCEDURE — 36415 COLL VENOUS BLD VENIPUNCTURE: CPT

## 2022-11-07 RX ORDER — ACETAMINOPHEN 325 MG/1
650 TABLET ORAL ONCE
Status: DISCONTINUED | OUTPATIENT
Start: 2022-11-07 | End: 2022-11-07 | Stop reason: HOSPADM

## 2022-11-07 RX ORDER — HYDROCHLOROTHIAZIDE 25 MG/1
25 TABLET ORAL DAILY
Status: DISCONTINUED | OUTPATIENT
Start: 2022-11-07 | End: 2022-11-07 | Stop reason: HOSPADM

## 2022-11-07 ASSESSMENT — ENCOUNTER SYMPTOMS
VOMITING: 0
TROUBLE SWALLOWING: 0
COUGH: 0
SHORTNESS OF BREATH: 0
CONSTIPATION: 0
ABDOMINAL PAIN: 0
DIARRHEA: 0
NAUSEA: 0
BLOOD IN STOOL: 0

## 2022-11-07 NOTE — ED PROVIDER NOTES
Patient presents emergency department with concern for left-sided facial numbness and left upper extremity numbness and weakness. She states that her symptoms started more than a day ago. She has also noticed an increase in lower extremity edema for the past week. She denies any chest pain or shortness of breath. She states no falls, no slurred speech no difficulty swallowing. She states that she walks slow but has no new weakness in her legs. The history is provided by the patient. Cerebrovascular Accident  Presenting symptoms: headaches and sensory loss (Left face and left upper extremity)    Presenting symptoms: no change in level of consciousness, no disturbances in coordination, no language symptoms, no loss of balance and no weakness    Onset quality:  Unable to specify  Last known well:  2 days ago  Timing:  Constant  Progression:  Unchanged  Similar to previous episodes: yes (Please TIA)    Associated symptoms: paresthesias    Associated symptoms: no chest pain, no difficulty swallowing, no dizziness, no fall, no fever, no nausea and no vomiting      Review of Systems   Constitutional:  Negative for activity change, appetite change, fatigue and fever. HENT: Negative. Negative for trouble swallowing. Eyes:  Negative for visual disturbance. Respiratory:  Negative for cough and shortness of breath. Cardiovascular:  Positive for leg swelling (Bilateral feet). Negative for chest pain. Gastrointestinal:  Negative for abdominal pain, blood in stool, constipation, diarrhea, nausea and vomiting. Genitourinary:  Negative for dysuria and flank pain. Musculoskeletal: Negative. Negative for gait problem. Skin: Negative. Neurological:  Positive for headaches and paresthesias. Negative for dizziness, syncope, facial asymmetry, speech difficulty, weakness, light-headedness, disturbances in coordination and loss of balance. Hematological: Negative. Psychiatric/Behavioral: Negative. Physical Exam  Vitals and nursing note reviewed. Constitutional:       General: She is not in acute distress. Appearance: Normal appearance. She is well-developed and normal weight. She is not ill-appearing, toxic-appearing or diaphoretic. HENT:      Head: Normocephalic and atraumatic. Nose: Nose normal.      Mouth/Throat:      Mouth: Mucous membranes are moist.      Pharynx: Oropharynx is clear. Eyes:      Extraocular Movements: Extraocular movements intact. Conjunctiva/sclera: Conjunctivae normal.      Pupils: Pupils are equal, round, and reactive to light. Cardiovascular:      Rate and Rhythm: Normal rate and regular rhythm. Pulses: Normal pulses. Heart sounds: Normal heart sounds. No murmur heard. Pulmonary:      Effort: Pulmonary effort is normal. No respiratory distress. Breath sounds: Normal breath sounds. No wheezing or rales. Abdominal:      General: Bowel sounds are normal.      Palpations: Abdomen is soft. Tenderness: There is no abdominal tenderness. There is no guarding or rebound. Musculoskeletal:         General: No tenderness. Normal range of motion. Cervical back: Normal range of motion and neck supple. Right lower leg: No edema. Left lower leg: No edema. Skin:     General: Skin is warm and dry. Capillary Refill: Capillary refill takes less than 2 seconds. Coloration: Skin is not pale. Neurological:      General: No focal deficit present. Mental Status: She is alert and oriented to person, place, and time. Mental status is at baseline. Cranial Nerves: No cranial nerve deficit. Motor: No weakness. Coordination: Coordination normal.      Comments: NIH is zero. Psychiatric:         Mood and Affect: Mood normal.         Behavior: Behavior normal.         Thought Content:  Thought content normal.         Judgment: Judgment normal.       Procedures    MDM  Patient presents with paresthesias to the face and upper extremities however on physical exam there is no numbness or weakness appreciated. She is currently on Plavix and should continue on for potential TIA like symptoms. She is also on a diuretic and I gave her an additional dose of that to help with the swelling of her feet. In talking with her and her  she does not ambulate much at home. I advised her to be more active as this will help to push the fluid back up to her heart and wear compression stockings when at home. She states understanding and will make the appointment to follow-up with her PCP. Blood pressure slightly elevated here in the emergency department however she is anxious. The additional hydrochlorothiazide should help to bring the pressure down some. EKG Interpretation    Interpreted by emergency department physician    Rhythm: normal sinus   Rate: normal  Axis: left  Ectopy: none  Conduction: left bundle branch block (complete)  ST Segments: no acute change  T Waves: no acute change  Q Waves: none    Clinical Impression: left bundle branch block new when compared to previous on 6/4/22    Tanisha Gan, DO       --------------------------------------------- PAST HISTORY ---------------------------------------------  Past Medical History:  has a past medical history of Anxiety, CVA (cerebral vascular accident) ([address] Depression, Diverticulitis, Hyperlipidemia, Hypertension, OA (osteoarthritis) of hip, OP (osteoporosis), Shingles, TIA (transient ischemic attack), Tinnitus, and Wears partial dentures. Past Surgical History:  has a past surgical history that includes back surgery; Cholecystectomy; Hysterectomy; hernia repair (2018); spinal cord stimulator implantation (N/A, 5/6/2019); spinal cord stimulator implantation (N/A, 5/14/2019); Colonoscopy (N/A, 8/28/2019); Upper gastrointestinal endoscopy (N/A, 8/28/2019);  Colonoscopy (8/28/2019); lumbar laminectomy; eye surgery; and spinal cord stimulator implantation (N/A, 1/9/2020). Social History:  reports that she has never smoked. She has never used smokeless tobacco. She reports current alcohol use. She reports that she does not use drugs. Family History: family history includes Stroke in her father. The patients home medications have been reviewed. Allergies: Pcn [penicillins]    -------------------------------------------------- RESULTS -------------------------------------------------  Labs:  Results for orders placed or performed during the hospital encounter of 71/52/66   Basic metabolic panel   Result Value Ref Range    Sodium 136 132 - 146 mmol/L    Potassium 4.2 3.5 - 5.0 mmol/L    Chloride 97 (L) 98 - 107 mmol/L    CO2 29 22 - 29 mmol/L    Anion Gap 10 7 - 16 mmol/L    Glucose 128 (H) 74 - 99 mg/dL    BUN 13 6 - 23 mg/dL    Creatinine 0.7 0.5 - 1.0 mg/dL    Est, Glom Filt Rate >60 >=60 mL/min/1.73    Calcium 9.3 8.6 - 10.2 mg/dL   CBC   Result Value Ref Range    WBC 10.0 4.5 - 11.5 E9/L    RBC 3.64 3.50 - 5.50 E12/L    Hemoglobin 11.4 (L) 11.5 - 15.5 g/dL    Hematocrit 35.3 34.0 - 48.0 %    MCV 97.0 80.0 - 99.9 fL    MCH 31.3 26.0 - 35.0 pg    MCHC 32.3 32.0 - 34.5 %    RDW 11.9 11.5 - 15.0 fL    Platelets 508 117 - 461 E9/L    MPV 9.0 7.0 - 12.0 fL   Troponin   Result Value Ref Range    Troponin, High Sensitivity 11 (H) 0 - 9 ng/L   Brain Natriuretic Peptide   Result Value Ref Range    Pro- 0 - 450 pg/mL       Radiology:  XR CHEST (2 VW)   Final Result   No acute process. CT HEAD WO CONTRAST   Final Result   No acute intracranial abnormality. Chronic atrophic brain changes and chronic microvascular disease.             ------------------------- NURSING NOTES AND VITALS REVIEWED ---------------------------  Date / Time Roomed:  11/7/2022 10:31 AM  ED Bed Assignment:  [id number]    The nursing notes within the ED encounter and vital signs as below have been reviewed.    BP (!) 199/95   Pulse 88   Temp 97.5 °F (36.4 °C) (Infrared)   Resp 18   Wt 151 lb (68.5 kg)   SpO2 99%   BMI 26.33 kg/m²   Oxygen Saturation Interpretation: Normal      ------------------------------------------ PROGRESS NOTES ------------------------------------------  I have spoken with the patient and spouse  and discussed todays results, in addition to providing specific details for the plan of care and counseling regarding the diagnosis and prognosis. Their questions are answered at this time and they are agreeable with the plan. I discussed at length with them reasons for immediate return here for re evaluation. They will followup with primary care by calling their office tomorrow. --------------------------------- ADDITIONAL PROVIDER NOTES ---------------------------------  At this time the patient is without objective evidence of an acute process requiring hospitalization or inpatient management. They have remained hemodynamically stable throughout their entire ED visit and are stable for discharge with outpatient follow-up. The plan has been discussed in detail and they are aware of the specific conditions for emergent return, as well as the importance of follow-up. New Prescriptions    No medications on file       Diagnosis:  1. Paresthesia    2. Essential hypertension    3. Peripheral edema        Disposition:  Patient's disposition: Discharge to home  Patient's condition is stable.          [address] [place], DO  11/07/22 1240

## 2022-11-09 ENCOUNTER — OFFICE VISIT (OUTPATIENT)
Dept: PRIMARY CARE CLINIC | Age: 82
End: 2022-11-09
Payer: MEDICARE

## 2022-11-09 VITALS
TEMPERATURE: 98 F | WEIGHT: 153 LBS | DIASTOLIC BLOOD PRESSURE: 84 MMHG | SYSTOLIC BLOOD PRESSURE: 142 MMHG | BODY MASS INDEX: 26.68 KG/M2

## 2022-11-09 DIAGNOSIS — E53.8 VITAMIN B 12 DEFICIENCY: ICD-10-CM

## 2022-11-09 DIAGNOSIS — D50.8 OTHER IRON DEFICIENCY ANEMIA: ICD-10-CM

## 2022-11-09 DIAGNOSIS — I10 ESSENTIAL HYPERTENSION: Chronic | ICD-10-CM

## 2022-11-09 DIAGNOSIS — I50.43 SYSTOLIC AND DIASTOLIC CHF, ACUTE ON CHRONIC (HCC): ICD-10-CM

## 2022-11-09 DIAGNOSIS — Z23 NEED FOR INFLUENZA VACCINATION: Primary | ICD-10-CM

## 2022-11-09 DIAGNOSIS — R60.0 EDEMA OF BOTH LOWER EXTREMITIES: ICD-10-CM

## 2022-11-09 PROCEDURE — 99214 OFFICE O/P EST MOD 30 MIN: CPT | Performed by: FAMILY MEDICINE

## 2022-11-09 PROCEDURE — 3078F DIAST BP <80 MM HG: CPT | Performed by: FAMILY MEDICINE

## 2022-11-09 PROCEDURE — 90694 VACC AIIV4 NO PRSRV 0.5ML IM: CPT | Performed by: FAMILY MEDICINE

## 2022-11-09 PROCEDURE — 1123F ACP DISCUSS/DSCN MKR DOCD: CPT | Performed by: FAMILY MEDICINE

## 2022-11-09 PROCEDURE — 3074F SYST BP LT 130 MM HG: CPT | Performed by: FAMILY MEDICINE

## 2022-11-09 PROCEDURE — G0008 ADMIN INFLUENZA VIRUS VAC: HCPCS | Performed by: FAMILY MEDICINE

## 2022-11-09 RX ORDER — POTASSIUM CHLORIDE 750 MG/1
10 TABLET, EXTENDED RELEASE ORAL DAILY
Qty: 30 TABLET | Refills: 5 | Status: SHIPPED | OUTPATIENT
Start: 2022-11-09

## 2022-11-09 RX ORDER — FUROSEMIDE 40 MG/1
40 TABLET ORAL DAILY
Qty: 30 TABLET | Refills: 5 | Status: SHIPPED | OUTPATIENT
Start: 2022-11-09

## 2022-11-09 ASSESSMENT — ENCOUNTER SYMPTOMS
GASTROINTESTINAL NEGATIVE: 1
RESPIRATORY NEGATIVE: 1
COUGH: 0
EYES NEGATIVE: 1
SHORTNESS OF BREATH: 0
ALLERGIC/IMMUNOLOGIC NEGATIVE: 1

## 2022-11-09 ASSESSMENT — PATIENT HEALTH QUESTIONNAIRE - PHQ9
SUM OF ALL RESPONSES TO PHQ QUESTIONS 1-9: 0
2. FEELING DOWN, DEPRESSED OR HOPELESS: 0
1. LITTLE INTEREST OR PLEASURE IN DOING THINGS: 0
SUM OF ALL RESPONSES TO PHQ QUESTIONS 1-9: 0
SUM OF ALL RESPONSES TO PHQ9 QUESTIONS 1 & 2: 0

## 2022-11-09 NOTE — PROGRESS NOTES
22  Name: Trino Lake    : 1940    Sex: female    Age: 80 y.o. Subjective:  Chief Complaint: Patient is here for EDMEA LEGS        cheeks  no itch s   no cp ro sob     In room alone---  hus  rdove    to er  two days ago   no t x offered   lab noted      Hb   11-4      Tired     sits all day    super inactive   no  cp        Review of Systems   Constitutional: Negative. HENT: Negative. Eyes: Negative. Respiratory: Negative. Negative for cough and shortness of breath. Cardiovascular:  Positive for leg swelling. Negative for chest pain and palpitations. Gastrointestinal: Negative. Endocrine: Negative. Genitourinary: Negative. Musculoskeletal:  Positive for arthralgias. Skin: Negative. Allergic/Immunologic: Negative. Neurological: Negative. Hematological: Negative. Psychiatric/Behavioral: Negative. Current Outpatient Medications:     furosemide (LASIX) 40 MG tablet, Take 1 tablet by mouth daily Replaces HCTZ, Disp: 30 tablet, Rfl: 5    potassium chloride (KLOR-CON M) 10 MEQ extended release tablet, Take 1 tablet by mouth daily, Disp: 30 tablet, Rfl: 5    gabapentin (NEURONTIN) 300 MG capsule, Take 1 capsule by mouth 4 times daily for 360 days. , Disp: 360 capsule, Rfl: 3    simvastatin (ZOCOR) 10 MG tablet, Take 1 tablet by mouth daily, Disp: 90 tablet, Rfl: 3    citalopram (CELEXA) 10 MG tablet, Take 1 tablet by mouth daily, Disp: 90 tablet, Rfl: 3    clopidogrel (PLAVIX) 75 MG tablet, Take 1 tablet by mouth daily, Disp: 90 tablet, Rfl: 3    carvedilol (COREG) 12.5 MG tablet, Take 1 tablet by mouth 2 times daily (with meals), Disp: 180 tablet, Rfl: 3    memantine (NAMENDA) 10 MG tablet, Take 1 tablet by mouth 2 times daily For memory, Disp: 180 tablet, Rfl: 5    losartan (COZAAR) 50 MG tablet, Take 1 tablet by mouth 2 times daily, Disp: 180 tablet, Rfl: 5  Allergies   Allergen Reactions    Pcn [Penicillins] Rash     Social History     Socioeconomic History    Marital status: Single     Spouse name: Not on file    Number of children: Not on file    Years of education: Not on file    Highest education level: Not on file   Occupational History    Not on file   Tobacco Use    Smoking status: Never    Smokeless tobacco: Never   Vaping Use    Vaping Use: Never used   Substance and Sexual Activity    Alcohol use: Yes     Comment: rare    Drug use: No    Sexual activity: Not Currently   Other Topics Concern    Not on file   Social History Narrative        CATARACTS    TINNITUS    GB OUT     C [address]     CVA---F  52 GF  FROM CVA AT 46    FH HTN BRO    CATH NEG     OP    TICS    BOYFRIEND NHAN Valdivia  1940 Page #2    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 BRADLEY---CAVALEIR X RAY    CH--5   Girl struthers   Two boys kinsman    Two out of town    [address] [place] [address]    CVA AFFECT L FACIAL SENSATION     [facility] OR ROB-----    COLON 1-10----NEG BUT PREVIOUS TICS AND POLYP    TIA -11    SHINGLES R ABD 2-12    MRI  WITH L-2-3 DISC AND NERVE----DR RIVAS----OR SET UP    ADMIT  WITH TIA----DR Chiquis Perkins DC ASA AND ADDED PLAVIX TO THE PERSANTINE    LUMBAR CHERYL OR DR RIVAS 10-12------------AGAIN DONE SEAMUS RIVAS 16    MOTHER  SUICIDE AGE 43    DAD  AT 52 CVA    FIRST HUS SUICIDE AT 46    X RAY 11=13 WITH NEW L-1 -2 NARROWING AND RETROLISTHESIS----SHOTS WITH DR Carlos Dee    ANX--DEP 1-14    OA HIPS DR CHARLTON---    DIET DM 4=16    LUMBAR DISC SURGERY 16 DR RIVAS    LEFT INGUINAL HERNIA WITH MESH DR BUNCH     ATHEROSCLEROIS EVAL DR BROTHERS     EVAL DR ALFONSO [place]--- ADIVSED ENT---PT SAYS HE TOLD HER NOT GET NEBULZIER---AND    REFUSESD FOLLOW UP    T-8 SPINAL CORD STIMULATOR TRIAL 19--------implanted permanent dr Diana Fore  5-19---device moved on buttock   1-20    [facility]  [address] [facility]    Son with  Lung ca and brain mets  2-20----- [place]--  at  62 yrs old    eval dr Wolf Mensah for dementia       Pain management----------------pain doc monthly    Er   with  elev  bp  and TIA    ct ok and left ama    Move to smaller house   ---mobile home    Er with edema legs       Youngest daughter  at 46    8-20   from drug overdose--lived in [place]     Social Determinants of Health     Financial Resource Strain: Not on file   Food Insecurity: Not on file   Transportation Needs: Not on file   Physical Activity: Inactive    Days of Exercise per Week: 0 days    Minutes of Exercise per Session: 0 min   Stress: Not on file   Social Connections: Not on file   Intimate Partner Violence: Not on file   Housing Stability: Not on file      Past Medical History:   Diagnosis Date    Anxiety     CVA (cerebral vascular accident) ([facility] [address]     L FACIAL SENSATION    Depression     Diverticulitis     Hyperlipidemia     Hypertension     OA (osteoarthritis) of hip     OP (osteoporosis)     Shingles     R ABD    TIA (transient ischemic attack)     Tinnitus     Wears partial dentures     upper     Family History   Problem Relation Age of Onset    Stroke Father       Past Surgical History:   Procedure Laterality Date    BACK SURGERY      x3    CHOLECYSTECTOMY      COLONOSCOPY N/A 2019    COLONOSCOPY WITH BIOPSY performed by Katy Perez MD at [address]  2019    COLONOSCOPY POLYPECTOMY SNARE/COLD BIOPSY performed by Katy Perez MD at [address]      [facility] implant    HERNIA REPAIR  2018    HYSTERECTOMY (CERVIX STATUS UNKNOWN)      LUMBAR LAMINECTOMY      SPINAL CORD STIMULATOR IMPLANTATION N/A 2019    T8 SPINAL CORD STIMULATOR  TRIAL PLACEMENT--KODAK HELTON [employer], [employer] performed by Otto Farooq MD at [facility] N/A 2019    PLACEMENT OF  PERMANENT SPINAL CORD STIMULATOR---KODAK WALKER [employer], [employer] performed by Otto Farooq MD at [address] [place] [address] SPINAL CORD STIMULATOR IMPLANTATION N/A 1/9/2020    REVISION OF SPINAL CORD STIMULATOR  BATTERY AND POCKET performed by Armando Bravo MD at [address] N/A 8/28/2019    EGD BIOPSY performed by Tri Ontiveros MD at [address]:    11/09/22 1518   BP: (!) 142/84   Temp: 98 °F (36.7 °C)   TempSrc: Oral   Weight: 153 lb (69.4 kg)       Objective:    Physical Exam  Vitals reviewed. Constitutional:       Appearance: Normal appearance. She is well-developed. HENT:      Head: Normocephalic. Right Ear: Tympanic membrane normal.      Left Ear: Tympanic membrane normal.      Nose: Nose normal.      Mouth/Throat:      Mouth: Mucous membranes are moist.   Eyes:      Pupils: Pupils are equal, round, and reactive to light. Cardiovascular:      Rate and Rhythm: Normal rate and regular rhythm. Comments: Mild bilat extrem swelling with no pitting     calf neg   neg homans  Pulmonary:      Effort: Pulmonary effort is normal.      Breath sounds: Normal breath sounds. Abdominal:      General: Bowel sounds are normal.      Palpations: Abdomen is soft. Musculoskeletal:         General: Normal range of motion. Cervical back: Normal range of motion. Skin:     General: Skin is warm. Neurological:      Mental Status: She is alert and oriented to person, place, and time. Psychiatric:         Behavior: Behavior normal.       Zoila Jules was seen today for follow-up. Diagnoses and all orders for this visit:    Need for influenza vaccination  -     Influenza, FLUAD, (age 72 y+), IM, Preservative Free, 0.5 mL    Edema of both lower extremities  -     furosemide (LASIX) 40 MG tablet; Take 1 tablet by mouth daily Replaces HCTZ  -     potassium chloride (KLOR-CON M) 10 MEQ extended release tablet; Take 1 tablet by mouth daily  -     CBC with Auto Differential; Future  -     Comprehensive Metabolic Panel; Future  -     Iron;  Future  -     Vitamin B12; Future  -     Brain Natriuretic Peptide; Future  -     D-Dimer, Quantitative; Future    Essential hypertension  -     CBC with Auto Differential; Future  -     Comprehensive Metabolic Panel; Future    Systolic and diastolic CHF, acute on chronic (HCC)  -     furosemide (LASIX) 40 MG tablet; Take 1 tablet by mouth daily Replaces HCTZ  -     potassium chloride (KLOR-CON M) 10 MEQ extended release tablet; Take 1 tablet by mouth daily  -     CBC with Auto Differential; Future  -     Comprehensive Metabolic Panel; Future  -     Iron; Future  -     Vitamin B12; Future  -     Brain Natriuretic Peptide; Future  -     D-Dimer, Quantitative; Future    Other iron deficiency anemia  -     CBC with Auto Differential; Future  -     Iron; Future    Vitamin B 12 deficiency  -     Vitamin B12; Future      Comments: lab today      chg  diuretic  to lasix  a nd K     see lul starks  if   cp ro s  to er      I educated the patient about all medications. Make sure they were correct and educated  on the risk associated with missing meds or taking them incorrectly. A list of medications is being sent home with patient today. Check blood pressure at home twice a day. Low-salt low caffeine diet. Call if systolic blood pressure is above 838 and diastolic blood pressures above 85. Only use a upper arm digital cuff. I informed patient about the risk associated with noncompliance of medication and taking medications incorrectly. Appropriate follow-up with myself and all specialist.  Encourage family members to take active role in assisting with medications and medical care. If any confusion should develop to notify my office immediately to avoid risk of worsening medical condition      A great deal of time spent reviewing medications, diet, exercise, social issues. Also reviewing the chart before entering the room with patient and finishing charting after leaving patient's room.  More than half of that time was spent face to face with the patient in counseling and coordinating care. Follow Up: Return in about 1 week (around 11/16/2022).      Seen by:  Frdey Das DO

## 2022-11-10 ENCOUNTER — CARE COORDINATION (OUTPATIENT)
Dept: CARE COORDINATION | Age: 82
End: 2022-11-10

## 2022-11-10 DIAGNOSIS — I50.43 SYSTOLIC AND DIASTOLIC CHF, ACUTE ON CHRONIC (HCC): ICD-10-CM

## 2022-11-10 DIAGNOSIS — R60.0 EDEMA OF BOTH LOWER EXTREMITIES: ICD-10-CM

## 2022-11-10 DIAGNOSIS — E53.8 VITAMIN B 12 DEFICIENCY: ICD-10-CM

## 2022-11-10 DIAGNOSIS — D50.8 OTHER IRON DEFICIENCY ANEMIA: ICD-10-CM

## 2022-11-10 DIAGNOSIS — I10 ESSENTIAL HYPERTENSION: Chronic | ICD-10-CM

## 2022-11-10 LAB
ALBUMIN SERPL-MCNC: 4.1 G/DL (ref 3.5–5.2)
ALP BLD-CCNC: 67 U/L (ref 35–104)
ALT SERPL-CCNC: 7 U/L (ref 0–32)
ANION GAP SERPL CALCULATED.3IONS-SCNC: 12 MMOL/L (ref 7–16)
AST SERPL-CCNC: 16 U/L (ref 0–31)
BASOPHILS ABSOLUTE: 0.1 E9/L (ref 0–0.2)
BASOPHILS RELATIVE PERCENT: 1 % (ref 0–2)
BILIRUB SERPL-MCNC: 0.3 MG/DL (ref 0–1.2)
BUN BLDV-MCNC: 15 MG/DL (ref 6–23)
CALCIUM SERPL-MCNC: 10 MG/DL (ref 8.6–10.2)
CHLORIDE BLD-SCNC: 99 MMOL/L (ref 98–107)
CO2: 27 MMOL/L (ref 22–29)
CREAT SERPL-MCNC: 0.8 MG/DL (ref 0.5–1)
D DIMER: 2342 NG/ML DDU
EOSINOPHILS ABSOLUTE: 0.36 E9/L (ref 0.05–0.5)
EOSINOPHILS RELATIVE PERCENT: 3.6 % (ref 0–6)
GFR SERPL CREATININE-BSD FRML MDRD: >60 ML/MIN/1.73
GLUCOSE BLD-MCNC: 113 MG/DL (ref 74–99)
HCT VFR BLD CALC: 36.9 % (ref 34–48)
HEMOGLOBIN: 12.1 G/DL (ref 11.5–15.5)
IMMATURE GRANULOCYTES #: 0.04 E9/L
IMMATURE GRANULOCYTES %: 0.4 % (ref 0–5)
IRON: 64 MCG/DL (ref 37–145)
LYMPHOCYTES ABSOLUTE: 2.03 E9/L (ref 1.5–4)
LYMPHOCYTES RELATIVE PERCENT: 20.5 % (ref 20–42)
MCH RBC QN AUTO: 32.4 PG (ref 26–35)
MCHC RBC AUTO-ENTMCNC: 32.8 % (ref 32–34.5)
MCV RBC AUTO: 98.9 FL (ref 80–99.9)
MONOCYTES ABSOLUTE: 0.96 E9/L (ref 0.1–0.95)
MONOCYTES RELATIVE PERCENT: 9.7 % (ref 2–12)
NEUTROPHILS ABSOLUTE: 6.43 E9/L (ref 1.8–7.3)
NEUTROPHILS RELATIVE PERCENT: 64.8 % (ref 43–80)
PDW BLD-RTO: 12 FL (ref 11.5–15)
PLATELET # BLD: 395 E9/L (ref 130–450)
PMV BLD AUTO: 9.3 FL (ref 7–12)
POTASSIUM SERPL-SCNC: 4.3 MMOL/L (ref 3.5–5)
PRO-BNP: 461 PG/ML (ref 0–450)
RBC # BLD: 3.73 E12/L (ref 3.5–5.5)
SODIUM BLD-SCNC: 138 MMOL/L (ref 132–146)
TOTAL PROTEIN: 7.2 G/DL (ref 6.4–8.3)
VITAMIN B-12: 502 PG/ML (ref 211–946)
WBC # BLD: 9.9 E9/L (ref 4.5–11.5)

## 2022-11-11 ENCOUNTER — CARE COORDINATION (OUTPATIENT)
Dept: CARE COORDINATION | Age: 82
End: 2022-11-11

## 2022-11-11 LAB
EKG ATRIAL RATE: 82 BPM
EKG P AXIS: 61 DEGREES
EKG P-R INTERVAL: 152 MS
EKG Q-T INTERVAL: 410 MS
EKG QRS DURATION: 136 MS
EKG QTC CALCULATION (BAZETT): 479 MS
EKG R AXIS: -20 DEGREES
EKG T AXIS: 81 DEGREES
EKG VENTRICULAR RATE: 82 BPM

## 2022-11-11 NOTE — LETTER
11/11/2022    [place] [address]      Dear Trino Lake    My name is Nichole Shea and I am an [address] [place] [address] who partners with Dr. Velia Carrion to improve patients' health. I've been trying to reach you via phone to let you know that, as a member of your care team, I will work with other providers involved in your care, offer education for your specific health conditions, and connect you with more resources as needed. This program is designed to provide you with the opportunity to have a ([facility]/[address]) care manager partner with you for the following situations:     1) if you come home from the hospital or emergency room   2) to help manage your disease   3) when you would like assistance coordinating services or appointments    This added support is provided at no additional cost to you. My primary focus is to help you achieve specific goals and improve your health. Please call me at [phone] to further discuss how I can support your health care needs.     Sincerely,      Kelsey Comer RN

## 2022-11-11 NOTE — CARE COORDINATION
-[facility]'s second attempt to reach patient  to offer enrollment into Care Coordination program & RPM services, however no answer. -HIPAA compliant  left introducing self, reason for call, and brief explanation of program.  -Left [facility]'s contact information, requesting call back. If no return call, will attempt outreach again.   -Will mail CC Introduction letter.

## 2022-11-28 ENCOUNTER — CARE COORDINATION (OUTPATIENT)
Dept: CARE COORDINATION | Age: 82
End: 2022-11-28

## 2022-11-28 NOTE — CARE COORDINATION
-[facility]'s third attempt to reach patient  to offer enrollment into Care Coordination program & RPM services after letter mailed a week ago, however no answer. -HIPAA compliant VM left that [facility] will no longer outreach to patient to offer enrollment.  -Patient encouraged to contact [facility] or inform PCP if she should change her mind.    -Left [facility]'s contact information. -[facility] will remove name from care team if no return call by end of today.

## 2022-12-21 RX ORDER — CITALOPRAM 10 MG/1
10 TABLET ORAL DAILY
Qty: 90 TABLET | Refills: 3 | Status: SHIPPED | OUTPATIENT
Start: 2022-12-21

## 2023-01-27 DIAGNOSIS — I10 ESSENTIAL HYPERTENSION: Chronic | ICD-10-CM

## 2023-01-27 NOTE — TELEPHONE ENCOUNTER
----- Message from Jin Hutchinson sent at 1/26/2023  1:52 PM EST -----  Subject: Refill Request    QUESTIONS  Name of Medication? losartan (COZAAR) 50 MG tablet  Patient-reported dosage and instructions? 50 mg twice daily  How many days do you have left? 3  Preferred Pharmacy? [address]  Pharmacy phone number (if available)? [phone]  Additional Information for Provider? patient would like for it to go to   [facility] the phone number [phone].  ---------------------------------------------------------------------------  --------------  [address]  What is the best way for the office to contact you? OK to leave message on   voicemail  Preferred Call Back Phone Number? [phone]  ---------------------------------------------------------------------------  --------------  SCRIPT ANSWERS  Relationship to Patient?  Self

## 2023-01-28 RX ORDER — LOSARTAN POTASSIUM 50 MG/1
50 TABLET ORAL 2 TIMES DAILY
Qty: 180 TABLET | Refills: 5 | Status: SHIPPED | OUTPATIENT
Start: 2023-01-28

## 2023-05-15 ENCOUNTER — TELEPHONE (OUTPATIENT)
Dept: PHARMACY | Facility: CLINIC | Age: 83
End: 2023-05-15

## 2023-05-15 NOTE — TELEPHONE ENCOUNTER
[facility] CLINICAL PHARMACY: ADHERENCE REVIEW  Identified care gap per [place]: fills at [facility] : ACE/ARB and Statin adherence    ASSESSMENT    ACE/ARB ADHERENCE    Insurance Records claims through 23 (Prior Year [place] = not reported; YTD [place] = FIRST FILL; Potential Fail Date: 23): Losartan 50mg last filled on 23 for 90 day supply. Next refill due: 23    Prescribed si tablet/capsule daily    Per Insurer Portal:same 5RF    First fill-pharmacy not contacted at this time. BP Readings from Last 3 Encounters:   22 (!) 142/84   22 (!) 162/78   10/03/22 (!) 132/92     CrCl cannot be calculated (Patient's most recent lab result is older than the maximum 180 days allowed. ). Lab Results   Component Value Date    CREATININE 0.8 11/10/2022     Lab Results   Component Value Date    K 4.3 11/10/2022     STATIN ADHERENCE    Insurance Records claims through 23 (Prior Year [place] = not reported; YTD [place] = FIRST FILL; Potential Fail Date: 23):   SIMVASTATIN 10 MG last filled on 23 for 90 day supply. Next refill due: 23    Prescribed si tablet/capsule daily    Per Insurer Portal:same 1RF    First fill-pharmacy not contacted at this time. Lab Results   Component Value Date    CHOL 184 2022    TRIG 121 2022    HDL 45 2022    LDLCALC 115 (H) 2022     ALT   Date Value Ref Range Status   11/10/2022 7 0 - 32 U/L Final     AST   Date Value Ref Range Status   11/10/2022 16 0 - 31 U/L Final     The ASCVD Risk score (Alondra DK, et al., 2019) failed to calculate for the following reasons: The 2019 ASCVD risk score is only valid for ages 36 to 78     The following are interventions that have been identified:   Patient overdue refilling both and active on home medication list.     Reached patient to review. She will call next week to refill    Last Visit: 22  Next Visit: No future appointments. Holli Rosado CPhT.    [facility]

## 2023-06-29 ENCOUNTER — OFFICE VISIT (OUTPATIENT)
Dept: NEUROSURGERY | Age: 83
End: 2023-06-29
Payer: MEDICARE

## 2023-06-29 VITALS — WEIGHT: 153 LBS | BODY MASS INDEX: 26.68 KG/M2

## 2023-06-29 DIAGNOSIS — T85.192A MALFUNCTION OF SPINAL CORD STIMULATOR, INITIAL ENCOUNTER (HCC): Primary | ICD-10-CM

## 2023-06-29 PROCEDURE — 1123F ACP DISCUSS/DSCN MKR DOCD: CPT | Performed by: NEUROLOGICAL SURGERY

## 2023-06-29 PROCEDURE — 99212 OFFICE O/P EST SF 10 MIN: CPT | Performed by: NEUROLOGICAL SURGERY

## 2023-06-29 PROCEDURE — 99212 OFFICE O/P EST SF 10 MIN: CPT

## 2023-07-06 ENCOUNTER — TELEPHONE (OUTPATIENT)
Dept: PHARMACY | Facility: CLINIC | Age: 83
End: 2023-07-06

## 2023-07-06 NOTE — TELEPHONE ENCOUNTER
[facility] CLINICAL PHARMACY: ADHERENCE REVIEW  Identified care gap per [place]: fills at [address] : ACE/ARB and Statin adherence    Patient also appears to be prescribed: ACE/ARB and Statin    ASSESSMENT    ACE/ARB ADHERENCE    Insurance Records claims through 23 (Prior Year [address] = not reported; YTD [address] [place] [address] Street = FIRST FILL; Potential Fail Date: ):   LOSARTAN POTASSIUM 50 MG TAB last filled on 23 for 90 day supply. Next refill due: 23    Prescribed si tablet/capsule daily    Per Reconcile Dispense History: last filled on 23 for 90 day supply. Per [address]: last picked up on 23 for 90 day supply. will get 90 day supply ready to  since past due. BP Readings from Last 3 Encounters:   22 (!) 142/84   22 (!) 162/78   10/03/22 (!) 132/92     CrCl cannot be calculated (Patient's most recent lab result is older than the maximum 180 days allowed. ). Lab Results   Component Value Date    CREATININE 0.8 11/10/2022     Lab Results   Component Value Date    K 4.3 11/10/2022     STATIN ADHERENCE    Insurance Records claims through 23 (Prior Year [address] [place] [address] = not reported; YTD [address] [place] [address] = FIRST FILL; Potential Fail Date: 23):   SIMVASTATIN 10 MG TABLET last filled on 23 for 90 day supply. Next refill due: 23    Prescribed si tablet/capsule daily    Per Reconcile Dispense History: last filled on 23 for 90 day supply. Per [address] [place] [address] Road: last picked up on 23 for 90 day supply. will get 90 day supply ready to  since past due. Lab Results   Component Value Date    CHOL 184 2022    TRIG 121 2022    HDL 45 2022    LDLCALC 115 (H) 2022     ALT   Date Value Ref Range Status   11/10/2022 7 0 - 32 U/L Final     AST   Date Value Ref Range Status   11/10/2022 16 0 - 31 U/L Final     The ASCVD Risk score (Alondra CALL, et al., 2019) failed to calculate for the following reasons:     The 2019

## 2023-10-09 ENCOUNTER — OFFICE VISIT (OUTPATIENT)
Dept: FAMILY MEDICINE CLINIC | Age: 83
End: 2023-10-09
Payer: COMMERCIAL

## 2023-10-09 VITALS
DIASTOLIC BLOOD PRESSURE: 96 MMHG | BODY MASS INDEX: 29.96 KG/M2 | RESPIRATION RATE: 18 BRPM | HEART RATE: 94 BPM | OXYGEN SATURATION: 98 % | TEMPERATURE: 98.2 F | SYSTOLIC BLOOD PRESSURE: 156 MMHG | HEIGHT: 60 IN | WEIGHT: 152.6 LBS

## 2023-10-09 DIAGNOSIS — E11.9 DIET-CONTROLLED DIABETES MELLITUS (HCC): ICD-10-CM

## 2023-10-09 DIAGNOSIS — M96.1 LUMBAR POST-LAMINECTOMY SYNDROME: Chronic | ICD-10-CM

## 2023-10-09 DIAGNOSIS — I10 ESSENTIAL HYPERTENSION: Chronic | ICD-10-CM

## 2023-10-09 DIAGNOSIS — Z23 NEED FOR INFLUENZA VACCINATION: Primary | ICD-10-CM

## 2023-10-09 DIAGNOSIS — I50.43 SYSTOLIC AND DIASTOLIC CHF, ACUTE ON CHRONIC (HCC): ICD-10-CM

## 2023-10-09 DIAGNOSIS — G30.9 ALZHEIMER'S DISEASE, UNSPECIFIED (CODE) (HCC): ICD-10-CM

## 2023-10-09 DIAGNOSIS — E78.2 MIXED HYPERLIPIDEMIA: Chronic | ICD-10-CM

## 2023-10-09 DIAGNOSIS — R41.3 MEMORY IMPAIRMENT: Chronic | ICD-10-CM

## 2023-10-09 PROCEDURE — 99214 OFFICE O/P EST MOD 30 MIN: CPT | Performed by: NEUROMUSCULOSKELETAL MEDICINE & OMM

## 2023-10-09 PROCEDURE — 3079F DIAST BP 80-89 MM HG: CPT | Performed by: NEUROMUSCULOSKELETAL MEDICINE & OMM

## 2023-10-09 PROCEDURE — 3077F SYST BP >= 140 MM HG: CPT | Performed by: NEUROMUSCULOSKELETAL MEDICINE & OMM

## 2023-10-09 PROCEDURE — G0008 ADMIN INFLUENZA VIRUS VAC: HCPCS | Performed by: NEUROMUSCULOSKELETAL MEDICINE & OMM

## 2023-10-09 PROCEDURE — 1123F ACP DISCUSS/DSCN MKR DOCD: CPT | Performed by: NEUROMUSCULOSKELETAL MEDICINE & OMM

## 2023-10-09 PROCEDURE — 90694 VACC AIIV4 NO PRSRV 0.5ML IM: CPT | Performed by: NEUROMUSCULOSKELETAL MEDICINE & OMM

## 2023-10-09 RX ORDER — MELATONIN
1000 DAILY
COMMUNITY

## 2023-10-09 RX ORDER — PANTOPRAZOLE SODIUM 40 MG/1
40 TABLET, DELAYED RELEASE ORAL DAILY
COMMUNITY

## 2023-10-09 RX ORDER — HYDROCHLOROTHIAZIDE 25 MG/1
25 TABLET ORAL DAILY
COMMUNITY
Start: 2023-07-06

## 2023-10-09 RX ORDER — TIZANIDINE 4 MG/1
4 TABLET ORAL EVERY 8 HOURS PRN
COMMUNITY

## 2023-10-09 RX ORDER — DICYCLOMINE HYDROCHLORIDE 10 MG/1
10 CAPSULE ORAL
COMMUNITY
End: 2023-10-09

## 2023-10-09 RX ORDER — POTASSIUM CHLORIDE 750 MG/1
10 TABLET, FILM COATED, EXTENDED RELEASE ORAL DAILY
COMMUNITY
Start: 2023-07-06 | End: 2023-10-09

## 2023-10-09 SDOH — ECONOMIC STABILITY: FOOD INSECURITY: WITHIN THE PAST 12 MONTHS, THE FOOD YOU BOUGHT JUST DIDN'T LAST AND YOU DIDN'T HAVE MONEY TO GET MORE.: NEVER TRUE

## 2023-10-09 SDOH — ECONOMIC STABILITY: INCOME INSECURITY: HOW HARD IS IT FOR YOU TO PAY FOR THE VERY BASICS LIKE FOOD, HOUSING, MEDICAL CARE, AND HEATING?: NOT HARD AT ALL

## 2023-10-09 SDOH — ECONOMIC STABILITY: HOUSING INSECURITY
IN THE LAST 12 MONTHS, WAS THERE A TIME WHEN YOU DID NOT HAVE A STEADY PLACE TO SLEEP OR SLEPT IN A SHELTER (INCLUDING NOW)?: NO

## 2023-10-09 SDOH — ECONOMIC STABILITY: FOOD INSECURITY: WITHIN THE PAST 12 MONTHS, YOU WORRIED THAT YOUR FOOD WOULD RUN OUT BEFORE YOU GOT MONEY TO BUY MORE.: NEVER TRUE

## 2023-10-09 ASSESSMENT — ENCOUNTER SYMPTOMS
ABDOMINAL PAIN: 0
CHEST TIGHTNESS: 0
BACK PAIN: 1
CHOKING: 0
VOMITING: 0
DIARRHEA: 0
SHORTNESS OF BREATH: 0
CONSTIPATION: 0
ABDOMINAL DISTENTION: 0
WHEEZING: 0
COUGH: 0
NAUSEA: 0

## 2023-10-09 ASSESSMENT — PATIENT HEALTH QUESTIONNAIRE - PHQ9
1. LITTLE INTEREST OR PLEASURE IN DOING THINGS: 0
SUM OF ALL RESPONSES TO PHQ9 QUESTIONS 1 & 2: 1
SUM OF ALL RESPONSES TO PHQ QUESTIONS 1-9: 1
2. FEELING DOWN, DEPRESSED OR HOPELESS: 1
SUM OF ALL RESPONSES TO PHQ QUESTIONS 1-9: 1

## 2023-10-09 NOTE — PROGRESS NOTES
mouth daily      tiZANidine (ZANAFLEX) 4 MG tablet Take 1 tablet by mouth every 8 hours as needed      losartan (COZAAR) 50 MG tablet Take 1 tablet by mouth 2 times daily 180 tablet 5    citalopram (CELEXA) 10 MG tablet Take 1 tablet by mouth daily 90 tablet 3    potassium chloride (KLOR-CON M) 10 MEQ extended release tablet Take 1 tablet by mouth daily 30 tablet 5    gabapentin (NEURONTIN) 300 MG capsule Take 1 capsule by mouth 4 times daily for 360 days. 360 capsule 3    simvastatin (ZOCOR) 10 MG tablet Take 1 tablet by mouth daily 90 tablet 3    clopidogrel (PLAVIX) 75 MG tablet Take 1 tablet by mouth daily 90 tablet 3    carvedilol (COREG) 12.5 MG tablet Take 1 tablet by mouth 2 times daily (with meals) 180 tablet 3    memantine (NAMENDA) 10 MG tablet Take 1 tablet by mouth 2 times daily For memory 180 tablet 5     No current facility-administered medications for this visit. Physical Exam  Vitals and nursing note reviewed. Constitutional:       General: She is not in acute distress. Appearance: Normal appearance. She is not ill-appearing or diaphoretic. HENT:      Head: Normocephalic and atraumatic. Eyes:      General: No scleral icterus. Right eye: No discharge. Left eye: No discharge. Conjunctiva/sclera: Conjunctivae normal.   Neck:      Vascular: No carotid bruit. Cardiovascular:      Rate and Rhythm: Normal rate and regular rhythm. Pulses: Normal pulses. Heart sounds: Normal heart sounds. No murmur heard. No friction rub. No gallop. Pulmonary:      Effort: Pulmonary effort is normal. No respiratory distress. Breath sounds: Normal breath sounds. No stridor. No wheezing, rhonchi or rales. Abdominal:      General: Bowel sounds are normal. There is no distension. Palpations: Abdomen is soft. Musculoskeletal:      Right lower leg: No edema. Left lower leg: No edema. Skin:     Findings: No lesion or rash.    Neurological:      General: No

## 2023-11-10 ENCOUNTER — HOSPITAL ENCOUNTER (OUTPATIENT)
Age: 83
Discharge: HOME | End: 2023-11-10
Payer: COMMERCIAL

## 2023-11-10 ENCOUNTER — OFFICE VISIT (OUTPATIENT)
Dept: FAMILY MEDICINE CLINIC | Age: 83
End: 2023-11-10
Payer: COMMERCIAL

## 2023-11-10 VITALS
OXYGEN SATURATION: 99 % | WEIGHT: 151 LBS | DIASTOLIC BLOOD PRESSURE: 77 MMHG | HEART RATE: 82 BPM | RESPIRATION RATE: 18 BRPM | BODY MASS INDEX: 29.64 KG/M2 | HEIGHT: 60 IN | TEMPERATURE: 96.7 F | SYSTOLIC BLOOD PRESSURE: 127 MMHG

## 2023-11-10 DIAGNOSIS — Z23 NEED FOR PROPHYLACTIC VACCINATION AGAINST DIPHTHERIA-TETANUS-PERTUSSIS (DTP): ICD-10-CM

## 2023-11-10 DIAGNOSIS — E11.9 DIET-CONTROLLED DIABETES MELLITUS (HCC): ICD-10-CM

## 2023-11-10 DIAGNOSIS — G45.9 TIA (TRANSIENT ISCHEMIC ATTACK): Chronic | ICD-10-CM

## 2023-11-10 DIAGNOSIS — G30.9 ALZHEIMER'S DISEASE, UNSPECIFIED (CODE) (HCC): ICD-10-CM

## 2023-11-10 DIAGNOSIS — R41.3 MEMORY IMPAIRMENT: Chronic | ICD-10-CM

## 2023-11-10 DIAGNOSIS — E78.2 MIXED HYPERLIPIDEMIA: Chronic | ICD-10-CM

## 2023-11-10 DIAGNOSIS — I10 ESSENTIAL HYPERTENSION: Chronic | ICD-10-CM

## 2023-11-10 DIAGNOSIS — I50.43 SYSTOLIC AND DIASTOLIC CHF, ACUTE ON CHRONIC (HCC): ICD-10-CM

## 2023-11-10 DIAGNOSIS — Z23 NEED FOR PROPHYLACTIC VACCINATION AND INOCULATION AGAINST VARICELLA: ICD-10-CM

## 2023-11-10 DIAGNOSIS — M96.1 FAILED BACK SYNDROME: Chronic | ICD-10-CM

## 2023-11-10 DIAGNOSIS — Z00.00 MEDICARE ANNUAL WELLNESS VISIT, SUBSEQUENT: Primary | ICD-10-CM

## 2023-11-10 LAB
ALBUMIN SERPL-MCNC: 4.4 G/DL (ref 3.5–5.2)
ALP SERPL-CCNC: 66 U/L (ref 35–104)
ALT SERPL-CCNC: 17 U/L (ref 0–32)
ANION GAP SERPL CALCULATED.3IONS-SCNC: 11 MMOL/L (ref 7–16)
AST SERPL-CCNC: 17 U/L (ref 0–31)
BILIRUB SERPL-MCNC: 0.4 MG/DL (ref 0–1.2)
BUN SERPL-MCNC: 17 MG/DL (ref 6–23)
CALCIUM SERPL-MCNC: 9.8 MG/DL (ref 8.6–10.2)
CHLORIDE SERPL-SCNC: 93 MMOL/L (ref 98–107)
CHOLEST SERPL-MCNC: 150 MG/DL
CO2 SERPL-SCNC: 28 MMOL/L (ref 22–29)
CREAT SERPL-MCNC: 0.9 MG/DL (ref 0.5–1)
ERYTHROCYTE [DISTWIDTH] IN BLOOD BY AUTOMATED COUNT: 11.9 % (ref 11.5–15)
GFR SERPL CREATININE-BSD FRML MDRD: >60 ML/MIN/1.73M2
GLUCOSE SERPL-MCNC: 148 MG/DL (ref 74–99)
HBA1C MFR BLD: 6.4 % (ref 4–5.6)
HCT VFR BLD AUTO: 37.4 % (ref 34–48)
HDLC SERPL-MCNC: 47 MG/DL
HGB BLD-MCNC: 12.7 G/DL (ref 11.5–15.5)
LDLC SERPL CALC-MCNC: 76 MG/DL
MCH RBC QN AUTO: 32.1 PG (ref 26–35)
MCHC RBC AUTO-ENTMCNC: 34 G/DL (ref 32–34.5)
MCV RBC AUTO: 94.4 FL (ref 80–99.9)
PLATELET # BLD AUTO: 337 K/UL (ref 130–450)
PMV BLD AUTO: 8.6 FL (ref 7–12)
POTASSIUM SERPL-SCNC: 4.7 MMOL/L (ref 3.5–5)
PROT SERPL-MCNC: 7.1 G/DL (ref 6.4–8.3)
RBC # BLD AUTO: 3.96 M/UL (ref 3.5–5.5)
SODIUM SERPL-SCNC: 132 MMOL/L (ref 132–146)
TRIGL SERPL-MCNC: 136 MG/DL
TSH SERPL DL<=0.05 MIU/L-ACNC: 1.86 UIU/ML (ref 0.27–4.2)
VLDLC SERPL CALC-MCNC: 27 MG/DL
WBC OTHER # BLD: 7.4 K/UL (ref 4.5–11.5)

## 2023-11-10 PROCEDURE — 36415 COLL VENOUS BLD VENIPUNCTURE: CPT

## 2023-11-10 PROCEDURE — 3078F DIAST BP <80 MM HG: CPT | Performed by: NEUROMUSCULOSKELETAL MEDICINE & OMM

## 2023-11-10 PROCEDURE — 83036 HEMOGLOBIN GLYCOSYLATED A1C: CPT

## 2023-11-10 PROCEDURE — 80053 COMPREHEN METABOLIC PANEL: CPT

## 2023-11-10 PROCEDURE — 85027 COMPLETE CBC AUTOMATED: CPT

## 2023-11-10 PROCEDURE — 1123F ACP DISCUSS/DSCN MKR DOCD: CPT | Performed by: NEUROMUSCULOSKELETAL MEDICINE & OMM

## 2023-11-10 PROCEDURE — G0439 PPPS, SUBSEQ VISIT: HCPCS | Performed by: NEUROMUSCULOSKELETAL MEDICINE & OMM

## 2023-11-10 PROCEDURE — 84443 ASSAY THYROID STIM HORMONE: CPT

## 2023-11-10 PROCEDURE — 3074F SYST BP LT 130 MM HG: CPT | Performed by: NEUROMUSCULOSKELETAL MEDICINE & OMM

## 2023-11-10 PROCEDURE — 80061 LIPID PANEL: CPT

## 2023-11-10 ASSESSMENT — PATIENT HEALTH QUESTIONNAIRE - PHQ9
SUM OF ALL RESPONSES TO PHQ QUESTIONS 1-9: 0
SUM OF ALL RESPONSES TO PHQ QUESTIONS 1-9: 0
2. FEELING DOWN, DEPRESSED OR HOPELESS: 0
SUM OF ALL RESPONSES TO PHQ9 QUESTIONS 1 & 2: 0
1. LITTLE INTEREST OR PLEASURE IN DOING THINGS: 0
SUM OF ALL RESPONSES TO PHQ QUESTIONS 1-9: 0
SUM OF ALL RESPONSES TO PHQ QUESTIONS 1-9: 0

## 2023-11-10 ASSESSMENT — LIFESTYLE VARIABLES
HOW MANY STANDARD DRINKS CONTAINING ALCOHOL DO YOU HAVE ON A TYPICAL DAY: PATIENT DOES NOT DRINK
HOW OFTEN DO YOU HAVE A DRINK CONTAINING ALCOHOL: NEVER

## 2023-11-10 NOTE — PROGRESS NOTES
Medicare Annual Wellness Visit    Birdie Ferreira is here for Medicare AWV    Assessment & Plan   Medicare annual wellness visit, subsequent  Systolic and diastolic CHF, acute on chronic (HCC)  Alzheimer's disease, unspecified  Essential hypertension  Comments:  optimal control with BP reading of 127/77, continue same anti-hypertensive regimen. Mixed hyperlipidemia  TIA (transient ischemic attack)  Comments:  continues to take the Plavix once a day, every day. Failed back syndrome  Comments:  Goes to pain management in [place], [place] MAYBE once a year. still with intermittent lower back pain. Need for prophylactic vaccination against diphtheria-tetanus-pertussis (DTP)  -     Tdap (ADACEL) 5-2-15.5 LF-MCG/0.5 injection; Inject 0.5 mLs into the muscle once for 1 dose, Disp-0.5 mL, R-0Print  Need for prophylactic vaccination and inoculation against varicella  -     zoster recombinant adjuvanted vaccine [facility]) 50 MCG/0.5ML SUSR injection; Inject 0.5 mLs into the muscle once for 1 dose, Disp-0.5 mL, R-1Print    Recommendations for Preventive Services Due: see orders and patient instructions/AVS.  Recommended screening schedule for the next 5-10 years is provided to the patient in written form: see Patient Instructions/AVS.     Return in 4 months (on 3/10/2024) for Medicare Annual Wellness Visit in 1 year. Subjective   The following acute and/or chronic problems were also addressed today:  No acute issues since last office visit. Patient's complete Health Risk Assessment and screening values have been reviewed and are found in Flowsheets. The following problems were reviewed today and where indicated follow up appointments were made and/or referrals ordered. Positive Risk Factor Screenings with Interventions:       Cognitive:    Words recalled: 2 Words Recalled           Total Score Interpretation: Abnormal Mini-Cog      Interventions:  Patient declines any further evaluation or treatment

## 2023-11-30 DIAGNOSIS — E78.2 MIXED HYPERLIPIDEMIA: Chronic | ICD-10-CM

## 2023-11-30 DIAGNOSIS — R60.0 EDEMA OF BOTH LOWER EXTREMITIES: ICD-10-CM

## 2023-11-30 DIAGNOSIS — I50.43 SYSTOLIC AND DIASTOLIC CHF, ACUTE ON CHRONIC (HCC): ICD-10-CM

## 2023-11-30 RX ORDER — POTASSIUM CHLORIDE 750 MG/1
10 TABLET, EXTENDED RELEASE ORAL DAILY
Qty: 30 TABLET | Refills: 5 | Status: SHIPPED | OUTPATIENT
Start: 2023-11-30

## 2023-11-30 RX ORDER — GABAPENTIN 300 MG/1
300 CAPSULE ORAL 4 TIMES DAILY
Qty: 360 CAPSULE | Refills: 3 | Status: SHIPPED | OUTPATIENT
Start: 2023-11-30 | End: 2024-11-24

## 2023-11-30 RX ORDER — SIMVASTATIN 10 MG
10 TABLET ORAL DAILY
Qty: 90 TABLET | Refills: 3 | Status: SHIPPED | OUTPATIENT
Start: 2023-11-30

## 2023-11-30 RX ORDER — HYDROCHLOROTHIAZIDE 25 MG/1
25 TABLET ORAL DAILY
Qty: 30 TABLET | Refills: 5 | Status: SHIPPED | OUTPATIENT
Start: 2023-11-30

## 2024-03-08 ENCOUNTER — OFFICE VISIT (OUTPATIENT)
Dept: FAMILY MEDICINE CLINIC | Age: 84
End: 2024-03-08
Payer: COMMERCIAL

## 2024-03-08 VITALS
DIASTOLIC BLOOD PRESSURE: 82 MMHG | HEIGHT: 60 IN | SYSTOLIC BLOOD PRESSURE: 139 MMHG | TEMPERATURE: 98.1 F | RESPIRATION RATE: 18 BRPM | WEIGHT: 155.4 LBS | BODY MASS INDEX: 30.51 KG/M2 | OXYGEN SATURATION: 98 % | HEART RATE: 76 BPM

## 2024-03-08 DIAGNOSIS — M96.1 LUMBAR POST-LAMINECTOMY SYNDROME: Chronic | ICD-10-CM

## 2024-03-08 DIAGNOSIS — G30.9 ALZHEIMER'S DISEASE, UNSPECIFIED (CODE) (HCC): ICD-10-CM

## 2024-03-08 DIAGNOSIS — E11.9 NEW ONSET TYPE 2 DIABETES MELLITUS (HCC): ICD-10-CM

## 2024-03-08 DIAGNOSIS — I10 ESSENTIAL HYPERTENSION: Primary | ICD-10-CM

## 2024-03-08 DIAGNOSIS — E78.2 MIXED HYPERLIPIDEMIA: ICD-10-CM

## 2024-03-08 PROBLEM — I50.43 SYSTOLIC AND DIASTOLIC CHF, ACUTE ON CHRONIC (HCC): Status: RESOLVED | Noted: 2023-10-09 | Resolved: 2024-03-08

## 2024-03-08 LAB — HBA1C MFR BLD: 6.7 %

## 2024-03-08 PROCEDURE — 3075F SYST BP GE 130 - 139MM HG: CPT | Performed by: NEUROMUSCULOSKELETAL MEDICINE & OMM

## 2024-03-08 PROCEDURE — 3044F HG A1C LEVEL LT 7.0%: CPT | Performed by: NEUROMUSCULOSKELETAL MEDICINE & OMM

## 2024-03-08 PROCEDURE — 83036 HEMOGLOBIN GLYCOSYLATED A1C: CPT | Performed by: NEUROMUSCULOSKELETAL MEDICINE & OMM

## 2024-03-08 PROCEDURE — 99214 OFFICE O/P EST MOD 30 MIN: CPT | Performed by: NEUROMUSCULOSKELETAL MEDICINE & OMM

## 2024-03-08 PROCEDURE — 3079F DIAST BP 80-89 MM HG: CPT | Performed by: NEUROMUSCULOSKELETAL MEDICINE & OMM

## 2024-03-08 PROCEDURE — 1123F ACP DISCUSS/DSCN MKR DOCD: CPT | Performed by: NEUROMUSCULOSKELETAL MEDICINE & OMM

## 2024-03-08 ASSESSMENT — ENCOUNTER SYMPTOMS
BACK PAIN: 0
SHORTNESS OF BREATH: 0
CHOKING: 0
ABDOMINAL PAIN: 0
CONSTIPATION: 0
ABDOMINAL DISTENTION: 0
WHEEZING: 0
NAUSEA: 0
STRIDOR: 0
BLOOD IN STOOL: 0
VOMITING: 0
COUGH: 0
CHEST TIGHTNESS: 0
DIARRHEA: 0

## 2024-03-08 ASSESSMENT — PATIENT HEALTH QUESTIONNAIRE - PHQ9
SUM OF ALL RESPONSES TO PHQ QUESTIONS 1-9: 2
1. LITTLE INTEREST OR PLEASURE IN DOING THINGS: 1
SUM OF ALL RESPONSES TO PHQ QUESTIONS 1-9: 2
2. FEELING DOWN, DEPRESSED OR HOPELESS: 1
SUM OF ALL RESPONSES TO PHQ QUESTIONS 1-9: 2
SUM OF ALL RESPONSES TO PHQ9 QUESTIONS 1 & 2: 2
SUM OF ALL RESPONSES TO PHQ QUESTIONS 1-9: 2

## 2024-03-08 NOTE — PROGRESS NOTES
daily (with meals) 180 tablet 3    memantine (NAMENDA) 10 MG tablet Take 1 tablet by mouth 2 times daily For memory 180 tablet 5     No current facility-administered medications for this visit.       Physical Exam  Vitals and nursing note reviewed.   Constitutional:       General: She is not in acute distress.     Appearance: Normal appearance. She is obese. She is not ill-appearing or diaphoretic.   HENT:      Head: Normocephalic and atraumatic.   Eyes:      General: No scleral icterus.        Right eye: No discharge.         Left eye: No discharge.      Conjunctiva/sclera: Conjunctivae normal.   Neck:      Vascular: No carotid bruit.   Cardiovascular:      Rate and Rhythm: Normal rate and regular rhythm.      Pulses: Normal pulses.      Heart sounds: Normal heart sounds. No murmur heard.     No friction rub. No gallop.   Pulmonary:      Effort: Pulmonary effort is normal. No respiratory distress.      Breath sounds: Normal breath sounds. No stridor. No wheezing, rhonchi or rales.   Abdominal:      General: Bowel sounds are normal. There is no distension.      Palpations: Abdomen is soft. There is no mass.   Musculoskeletal:      Right lower leg: No edema.      Left lower leg: No edema.   Lymphadenopathy:      Cervical: No cervical adenopathy.   Skin:     Findings: No lesion or rash.   Neurological:      Mental Status: She is alert and oriented to person, place, and time.   Psychiatric:         Mood and Affect: Mood normal.         Behavior: Behavior normal.         Thought Content: Thought content normal.         Judgment: Judgment normal.         Health Maintenance Due   Topic Date Due    DTaP/Tdap/Td vaccine (1 - Tdap) Never done    Respiratory Syncytial Virus (RSV) Pregnant or age 60 yrs+ (1 - 1-dose 60+ series) Never done    COVID-19 Vaccine (4 - 2023-24 season) 09/01/2023    Annual Wellness Visit (Medicare Advantage)  01/01/2024    Shingles vaccine (2 of 2) 01/05/2024     Last colonoscopy n/a  Last mammogram  [de-identified] : STI testing  [FreeTextEntry6] : 17 year old male presenting for STI & HIV testing and presumptive treatment for gonorrhea. \par \par Last sex: 2 days ago with male partner, oral & anal sex (insertive & receptive), used condom for oral sex, no condom use for anal sex. \par \par Pt reports that his partner from 2 weeks ago, with whom he had anal or oral sex, tested positive for gonorrhea. Pt's former partner informed him yesterday of the positive gonorrhea diagnosis. \par \par # of partners in the last 3 months: 3-4 male partners. Pt sometimes uses condoms. Pt denies sexual activity in exchange for money, place to sleep, or other goods. \par \par Pt was previously on PrEP. Pt took PrEP about 5 weeks ago but was not taking medication daily as directed. Pt reports side effects including nausea and diarrhea on Truvada.  \par \par Pt denies dysuria, pain with testicles, discharge from the penis, anal itching, fecal incontinence, or sore throat. \par \par Pt reports intermittent stomach pain and diarrhea, attributes to diet. Pt reports eating more greasy foods, which upsets stomach.

## 2024-03-22 ENCOUNTER — OFFICE VISIT (OUTPATIENT)
Dept: FAMILY MEDICINE CLINIC | Age: 84
End: 2024-03-22
Payer: COMMERCIAL

## 2024-03-22 DIAGNOSIS — E11.9 NEW ONSET TYPE 2 DIABETES MELLITUS (HCC): Primary | ICD-10-CM

## 2024-03-22 DIAGNOSIS — E11.9 NEW ONSET TYPE 2 DIABETES MELLITUS (HCC): ICD-10-CM

## 2024-03-22 LAB
CHP ED QC CHECK: NORMAL
GLUCOSE BLD-MCNC: 127 MG/DL

## 2024-03-22 PROCEDURE — 82962 GLUCOSE BLOOD TEST: CPT | Performed by: NEUROMUSCULOSKELETAL MEDICINE & OMM

## 2024-03-22 PROCEDURE — 99999 PR OFFICE/OUTPT VISIT,PROCEDURE ONLY: CPT | Performed by: NEUROMUSCULOSKELETAL MEDICINE & OMM

## 2024-04-24 LAB — DIABETIC RETINOPATHY: NEGATIVE

## 2024-05-08 ENCOUNTER — OFFICE VISIT (OUTPATIENT)
Dept: FAMILY MEDICINE CLINIC | Age: 84
End: 2024-05-08
Payer: COMMERCIAL

## 2024-05-08 VITALS
TEMPERATURE: 97.6 F | OXYGEN SATURATION: 93 % | DIASTOLIC BLOOD PRESSURE: 74 MMHG | HEIGHT: 60 IN | RESPIRATION RATE: 18 BRPM | WEIGHT: 153.8 LBS | BODY MASS INDEX: 30.19 KG/M2 | HEART RATE: 78 BPM | SYSTOLIC BLOOD PRESSURE: 134 MMHG

## 2024-05-08 DIAGNOSIS — G25.0 BENIGN ESSENTIAL TREMOR: ICD-10-CM

## 2024-05-08 DIAGNOSIS — I10 ESSENTIAL HYPERTENSION: ICD-10-CM

## 2024-05-08 DIAGNOSIS — E11.9 NEW ONSET TYPE 2 DIABETES MELLITUS (HCC): ICD-10-CM

## 2024-05-08 DIAGNOSIS — Z86.73 HISTORY OF STROKE: ICD-10-CM

## 2024-05-08 DIAGNOSIS — E78.2 MIXED HYPERLIPIDEMIA: Primary | Chronic | ICD-10-CM

## 2024-05-08 DIAGNOSIS — E55.9 VITAMIN D DEFICIENCY: ICD-10-CM

## 2024-05-08 DIAGNOSIS — G30.9 ALZHEIMER'S DISEASE, UNSPECIFIED (CODE) (HCC): ICD-10-CM

## 2024-05-08 PROBLEM — M46.1 SACROILIITIS (HCC): Status: ACTIVE | Noted: 2018-03-23

## 2024-05-08 PROCEDURE — 3044F HG A1C LEVEL LT 7.0%: CPT | Performed by: NEUROMUSCULOSKELETAL MEDICINE & OMM

## 2024-05-08 PROCEDURE — 3078F DIAST BP <80 MM HG: CPT | Performed by: NEUROMUSCULOSKELETAL MEDICINE & OMM

## 2024-05-08 PROCEDURE — 1123F ACP DISCUSS/DSCN MKR DOCD: CPT | Performed by: NEUROMUSCULOSKELETAL MEDICINE & OMM

## 2024-05-08 PROCEDURE — 99214 OFFICE O/P EST MOD 30 MIN: CPT | Performed by: NEUROMUSCULOSKELETAL MEDICINE & OMM

## 2024-05-08 PROCEDURE — 3075F SYST BP GE 130 - 139MM HG: CPT | Performed by: NEUROMUSCULOSKELETAL MEDICINE & OMM

## 2024-05-08 ASSESSMENT — ENCOUNTER SYMPTOMS
BACK PAIN: 0
ABDOMINAL DISTENTION: 0
NAUSEA: 0
ANAL BLEEDING: 0
CHEST TIGHTNESS: 0
COUGH: 0
SHORTNESS OF BREATH: 0
CONSTIPATION: 0
CHOKING: 0
ABDOMINAL PAIN: 0
WHEEZING: 0
DIARRHEA: 0

## 2024-05-08 NOTE — PROGRESS NOTES
Rosio Begum (:  1940) is a 83 y.o. female,Established patient, here for evaluation of the following chief complaint(s):  Hypertension, Diabetes, and Follow-up (2 month follow up , no complaints at this time. )      Assessment & Plan   ASSESSMENT/PLAN:  1. Mixed hyperlipidemia  Comments:  Chronic condition well-controlled on Zocor 10 mg daily.  Last lipid panel in  showed LDL 76, triglycerides 136.  Orders:  -     Lipid Panel; Future  2. History of stroke  Comments:  history of TIA's/CVA about 10 years ago.  Remains on Plavix 75 mg daily with good compliance.  3. New onset type 2 diabetes mellitus (HCC)  Comments:  Last hemoglobin A1c was 6.7 in .  Well-controlled on Glucophage 500 mg daily with breakfast  Orders:  -     Comprehensive Metabolic Panel; Future  4. Alzheimer's disease, unspecified  Comments:  Patient somewhat forgetful over the last 1 to 2 months continue on Namenda 10 mg twice a day.  May need to add additional anticholinergic at next OV.  5. Essential hypertension  Comments:  Chronic condition well-controlled with optimal blood pressure reading today of 134/74.  Continue same antihypertensive regimen.  Orders:  -     CBC with Auto Differential; Future  6. Vitamin D deficiency  -     Vitamin D 25 Hydroxy; Future  7. Benign essential tremor  Comments:  no treatment needed. is not an issue with action.      Return in about 4 months (around 2024) for to monitor medical conditions.         Subjective   SUBJECTIVE/OBJECTIVE:  HPI 83-year-old female here for Hypertension, Diabetes, and Follow-up (2 month follow up , no complaints at this time. )    Review of Systems   Constitutional:  Negative for activity change, appetite change, chills, diaphoresis, fatigue and fever.   Respiratory:  Negative for cough, choking, chest tightness, shortness of breath and wheezing.    Cardiovascular:  Negative for chest pain, palpitations and leg swelling.   Gastrointestinal:

## 2024-05-14 ENCOUNTER — HOSPITAL ENCOUNTER (OUTPATIENT)
Age: 84
Discharge: HOME | End: 2024-05-14
Payer: COMMERCIAL

## 2024-05-14 DIAGNOSIS — E78.2 MIXED HYPERLIPIDEMIA: Chronic | ICD-10-CM

## 2024-05-14 DIAGNOSIS — I10 ESSENTIAL HYPERTENSION: ICD-10-CM

## 2024-05-14 DIAGNOSIS — E55.9 VITAMIN D DEFICIENCY: ICD-10-CM

## 2024-05-14 DIAGNOSIS — E11.9 NEW ONSET TYPE 2 DIABETES MELLITUS (HCC): ICD-10-CM

## 2024-05-14 LAB
25(OH)D3 SERPL-MCNC: 37.6 NG/ML (ref 30–100)
ALBUMIN SERPL-MCNC: 4.3 G/DL (ref 3.5–5.2)
ALP SERPL-CCNC: 71 U/L (ref 35–104)
ALT SERPL-CCNC: 14 U/L (ref 0–32)
ANION GAP SERPL CALCULATED.3IONS-SCNC: 9 MMOL/L (ref 7–16)
AST SERPL-CCNC: 15 U/L (ref 0–31)
BASOPHILS # BLD: 0.06 K/UL (ref 0–0.2)
BASOPHILS NFR BLD: 1 % (ref 0–2)
BILIRUB SERPL-MCNC: 0.5 MG/DL (ref 0–1.2)
BUN SERPL-MCNC: 15 MG/DL (ref 6–23)
CALCIUM SERPL-MCNC: 9.6 MG/DL (ref 8.6–10.2)
CHLORIDE SERPL-SCNC: 98 MMOL/L (ref 98–107)
CHOLEST SERPL-MCNC: 147 MG/DL
CO2 SERPL-SCNC: 28 MMOL/L (ref 22–29)
CREAT SERPL-MCNC: 0.8 MG/DL (ref 0.5–1)
EOSINOPHIL # BLD: 0.1 K/UL (ref 0.05–0.5)
EOSINOPHILS RELATIVE PERCENT: 1 % (ref 0–6)
ERYTHROCYTE [DISTWIDTH] IN BLOOD BY AUTOMATED COUNT: 12.2 % (ref 11.5–15)
GFR, ESTIMATED: 74 ML/MIN/1.73M2
GLUCOSE SERPL-MCNC: 131 MG/DL (ref 74–99)
HCT VFR BLD AUTO: 36.8 % (ref 34–48)
HDLC SERPL-MCNC: 47 MG/DL
HGB BLD-MCNC: 12.4 G/DL (ref 11.5–15.5)
IMM GRANULOCYTES # BLD AUTO: <0.03 K/UL (ref 0–0.58)
IMM GRANULOCYTES NFR BLD: 0 % (ref 0–5)
LDLC SERPL CALC-MCNC: 80 MG/DL
LYMPHOCYTES NFR BLD: 1.99 K/UL (ref 1.5–4)
LYMPHOCYTES RELATIVE PERCENT: 28 % (ref 20–42)
MCH RBC QN AUTO: 32.1 PG (ref 26–35)
MCHC RBC AUTO-ENTMCNC: 33.7 G/DL (ref 32–34.5)
MCV RBC AUTO: 95.3 FL (ref 80–99.9)
MONOCYTES NFR BLD: 0.47 K/UL (ref 0.1–0.95)
MONOCYTES NFR BLD: 7 % (ref 2–12)
NEUTROPHILS NFR BLD: 63 % (ref 43–80)
NEUTS SEG NFR BLD: 4.47 K/UL (ref 1.8–7.3)
PLATELET # BLD AUTO: 323 K/UL (ref 130–450)
PMV BLD AUTO: 8.2 FL (ref 7–12)
POTASSIUM SERPL-SCNC: 4.3 MMOL/L (ref 3.5–5)
PROT SERPL-MCNC: 7.1 G/DL (ref 6.4–8.3)
RBC # BLD AUTO: 3.86 M/UL (ref 3.5–5.5)
SODIUM SERPL-SCNC: 135 MMOL/L (ref 132–146)
TRIGL SERPL-MCNC: 101 MG/DL
VLDLC SERPL CALC-MCNC: 20 MG/DL
WBC OTHER # BLD: 7.1 K/UL (ref 4.5–11.5)

## 2024-05-14 PROCEDURE — 82306 VITAMIN D 25 HYDROXY: CPT

## 2024-05-14 PROCEDURE — 36415 COLL VENOUS BLD VENIPUNCTURE: CPT

## 2024-05-14 PROCEDURE — 80053 COMPREHEN METABOLIC PANEL: CPT

## 2024-05-14 PROCEDURE — 85025 COMPLETE CBC W/AUTO DIFF WBC: CPT

## 2024-05-14 PROCEDURE — 80061 LIPID PANEL: CPT

## 2024-07-24 ENCOUNTER — OFFICE VISIT (OUTPATIENT)
Dept: PRIMARY CARE CLINIC | Age: 84
End: 2024-07-24
Payer: COMMERCIAL

## 2024-07-24 VITALS
HEIGHT: 60 IN | HEART RATE: 93 BPM | SYSTOLIC BLOOD PRESSURE: 115 MMHG | BODY MASS INDEX: 28.86 KG/M2 | OXYGEN SATURATION: 99 % | WEIGHT: 147 LBS | DIASTOLIC BLOOD PRESSURE: 71 MMHG | TEMPERATURE: 97.7 F

## 2024-07-24 DIAGNOSIS — S79.912A HIP INJURY, LEFT, INITIAL ENCOUNTER: ICD-10-CM

## 2024-07-24 DIAGNOSIS — S39.92XA INJURY OF BACK, INITIAL ENCOUNTER: ICD-10-CM
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
## 2024-07-24 PROCEDURE — 96372 THER/PROPH/DIAG INJ SC/IM: CPT | Performed by: NURSE PRACTITIONER

## 2024-07-24 PROCEDURE — 3078F DIAST BP <80 MM HG: CPT | Performed by: NURSE PRACTITIONER

## 2024-07-24 PROCEDURE — 3074F SYST BP LT 130 MM HG: CPT | Performed by: NURSE PRACTITIONER

## 2024-07-24 PROCEDURE — 99213 OFFICE O/P EST LOW 20 MIN: CPT | Performed by: NURSE PRACTITIONER

## 2024-07-24 PROCEDURE — 1123F ACP DISCUSS/DSCN MKR DOCD: CPT | Performed by: NURSE PRACTITIONER

## 2024-07-24 RX ORDER — DEXAMETHASONE SODIUM PHOSPHATE 10 MG/ML
5 INJECTION INTRAMUSCULAR; INTRAVENOUS ONCE
Status: COMPLETED | OUTPATIENT
Start: 2024-07-24 | End: 2024-07-24

## 2024-07-24 RX ORDER — PREDNISONE 10 MG/1
10 TABLET ORAL 2 TIMES DAILY
Qty: 10 TABLET | Refills: 0 | Status: SHIPPED | OUTPATIENT
Start: 2024-07-24 | End: 2024-07-29

## 2024-07-24 RX ADMIN — DEXAMETHASONE SODIUM PHOSPHATE 5 MG: 10 INJECTION INTRAMUSCULAR; INTRAVENOUS at 14:14

## 2024-07-24 NOTE — PROGRESS NOTES
at [facility] [place] ENDOSCOPY    COLONOSCOPY  8/28/2019    COLONOSCOPY POLYPECTOMY SNARE/COLD BIOPSY performed by Kam Cobb MD at [facility] [place] ENDOSCOPY    EYE SURGERY      kuldip lense implant    HERNIA REPAIR  2018    HYSTERECTOMY (CERVIX STATUS UNKNOWN)      LUMBAR LAMINECTOMY      SPINAL CORD STIMULATOR IMPLANTATION N/A 5/6/2019    T8 SPINAL CORD STIMULATOR  TRIAL PLACEMENT--SUNITHA, KODAK TABLE, [employer] SCIENTIFIC performed by Demarcus Mckinley MD at [facility] [place] OR    SPINAL CORD STIMULATOR IMPLANTATION N/A 5/14/2019    PLACEMENT OF  PERMANENT SPINAL CORD STIMULATOR---XRAY, KODAK TABLE, [employer] SCIENTIFIC performed by Demarcus Mckinley MD at [facility] [place] OR    SPINAL CORD STIMULATOR IMPLANTATION N/A 1/9/2020    REVISION OF SPINAL CORD STIMULATOR  BATTERY AND POCKET performed by Demarcus Mckinley MD at [facility] [place] OR    UPPER GASTROINTESTINAL ENDOSCOPY N/A 8/28/2019    EGD BIOPSY performed by Kam Cobb MD at [facility] [place] END[facility]COPY     Social History:  reports that she has never smoked. She has never used smokeless tobacco. She reports current alcohol use. She reports that she does not use drugs.  Family History: family history includes Stroke in her father.  Allergies: Pcn [penicillins]    Physical Exam         VS:  /71   Pulse 93   Temp 97.7 °F (36.5 °C)   Ht 1.524 m (5')   Wt 66.7 kg (147 lb)   SpO2 99%   BMI 28.71 kg/m²    Oxygen Saturation Interpretation: Normal.    Constitutional:  Alert, development consistent with age.  HEENT:  NC/NT.  Airway patent.  Eyes No discharge  External ears normal.    Neck:  Normal ROM.   Lungs:  Clear to auscultation and breath sounds equal.  Heart:  Regular rate and rhythm, normal heart sounds, without pathological murmurs, ectopy, gallops, or rubs.  Back: Tenderness: TTP over lumbar spine and bilateral lumbar paraspinal muscles tenderness.  Bilateral SI tenderness present            Swelling: No edema.               Range of Motion: Decreased d/t pain            CVA Tenderness: No CVA tenderness bilaterally.

## 2024-09-09 ENCOUNTER — OFFICE VISIT (OUTPATIENT)
Dept: FAMILY MEDICINE CLINIC | Age: 84
End: 2024-09-09
Payer: COMMERCIAL

## 2024-09-09 VITALS
HEART RATE: 93 BPM | SYSTOLIC BLOOD PRESSURE: 131 MMHG | OXYGEN SATURATION: 95 % | BODY MASS INDEX: 29.53 KG/M2 | HEIGHT: 60 IN | DIASTOLIC BLOOD PRESSURE: 83 MMHG | WEIGHT: 150.4 LBS | TEMPERATURE: 97.2 F | RESPIRATION RATE: 18 BRPM

## 2024-09-09 DIAGNOSIS — E11.9 NEW ONSET TYPE 2 DIABETES MELLITUS (HCC): ICD-10-CM

## 2024-09-09 DIAGNOSIS — G30.9 ALZHEIMER'S DISEASE, UNSPECIFIED (CODE) (HCC): ICD-10-CM

## 2024-09-09 DIAGNOSIS — R29.6 FREQUENT FALLS: Primary | ICD-10-CM

## 2024-09-09 DIAGNOSIS — E78.2 MIXED HYPERLIPIDEMIA: Chronic | ICD-10-CM

## 2024-09-09 DIAGNOSIS — G89.4 CHRONIC PAIN SYNDROME: Chronic | ICD-10-CM

## 2024-09-09 DIAGNOSIS — E55.9 VITAMIN D DEFICIENCY: ICD-10-CM

## 2024-09-09 DIAGNOSIS — M54.41 CHRONIC BILATERAL LOW BACK PAIN WITH RIGHT-SIDED SCIATICA: Chronic | ICD-10-CM

## 2024-09-09 DIAGNOSIS — G89.29 CHRONIC BILATERAL LOW BACK PAIN WITH RIGHT-SIDED SCIATICA: Chronic | ICD-10-CM

## 2024-09-09 DIAGNOSIS — Z86.73 HISTORY OF STROKE: ICD-10-CM

## 2024-09-09 DIAGNOSIS — M96.1 LUMBAR POST-LAMINECTOMY SYNDROME: Chronic | ICD-10-CM

## 2024-09-09 DIAGNOSIS — I10 ESSENTIAL HYPERTENSION: ICD-10-CM

## 2024-09-09 PROBLEM — M46.1 SACROILIITIS (HCC): Status: RESOLVED | Noted: 2018-03-23 | Resolved: 2024-09-09

## 2024-09-09 LAB — HBA1C MFR BLD: 7.1 %

## 2024-09-09 PROCEDURE — 3079F DIAST BP 80-89 MM HG: CPT | Performed by: NEUROMUSCULOSKELETAL MEDICINE & OMM

## 2024-09-09 PROCEDURE — 99213 OFFICE O/P EST LOW 20 MIN: CPT | Performed by: NEUROMUSCULOSKELETAL MEDICINE & OMM

## 2024-09-09 PROCEDURE — 83036 HEMOGLOBIN GLYCOSYLATED A1C: CPT | Performed by: NEUROMUSCULOSKELETAL MEDICINE & OMM

## 2024-09-09 PROCEDURE — 1123F ACP DISCUSS/DSCN MKR DOCD: CPT | Performed by: NEUROMUSCULOSKELETAL MEDICINE & OMM

## 2024-09-09 PROCEDURE — 3051F HG A1C>EQUAL 7.0%<8.0%: CPT | Performed by: NEUROMUSCULOSKELETAL MEDICINE & OMM

## 2024-09-09 PROCEDURE — G2211 COMPLEX E/M VISIT ADD ON: HCPCS | Performed by: NEUROMUSCULOSKELETAL MEDICINE & OMM

## 2024-09-09 PROCEDURE — 3075F SYST BP GE 130 - 139MM HG: CPT | Performed by: NEUROMUSCULOSKELETAL MEDICINE & OMM

## 2024-09-09 RX ORDER — POTASSIUM CHLORIDE 750 MG/1
10 TABLET, EXTENDED RELEASE ORAL DAILY
COMMUNITY
Start: 2024-07-22

## 2024-09-09 ASSESSMENT — ENCOUNTER SYMPTOMS
WHEEZING: 0
CHOKING: 0
CHEST TIGHTNESS: 0
ABDOMINAL PAIN: 0
CONSTIPATION: 0
NAUSEA: 0
COUGH: 0
ABDOMINAL DISTENTION: 0
VOMITING: 0
VOICE CHANGE: 0
SHORTNESS OF BREATH: 0
BLOOD IN STOOL: 0
TROUBLE SWALLOWING: 0

## 2024-09-10 ENCOUNTER — TELEPHONE (OUTPATIENT)
Dept: FAMILY MEDICINE CLINIC | Age: 84
End: 2024-09-10

## 2024-09-10 DIAGNOSIS — I50.43 SYSTOLIC AND DIASTOLIC CHF, ACUTE ON CHRONIC (HCC): ICD-10-CM

## 2024-09-10 DIAGNOSIS — R60.0 EDEMA OF BOTH LOWER EXTREMITIES: ICD-10-CM

## 2024-09-12 RX ORDER — HYDROCHLOROTHIAZIDE 25 MG/1
25 TABLET ORAL DAILY
Qty: 30 TABLET | Refills: 5 | Status: SHIPPED | OUTPATIENT
Start: 2024-09-12

## 2024-09-12 RX ORDER — POTASSIUM CHLORIDE 750 MG/1
10 TABLET, EXTENDED RELEASE ORAL DAILY
Qty: 30 TABLET | Refills: 5 | Status: SHIPPED | OUTPATIENT
Start: 2024-09-12

## 2025-01-08 ENCOUNTER — OFFICE VISIT (OUTPATIENT)
Dept: FAMILY MEDICINE CLINIC | Age: 85
End: 2025-01-08

## 2025-01-08 VITALS
SYSTOLIC BLOOD PRESSURE: 134 MMHG | TEMPERATURE: 97.4 F | WEIGHT: 143.6 LBS | HEART RATE: 86 BPM | BODY MASS INDEX: 28.19 KG/M2 | HEIGHT: 60 IN | DIASTOLIC BLOOD PRESSURE: 87 MMHG | RESPIRATION RATE: 18 BRPM

## 2025-01-08 DIAGNOSIS — I50.43 SYSTOLIC AND DIASTOLIC CHF, ACUTE ON CHRONIC (HCC): Primary | ICD-10-CM

## 2025-01-08 DIAGNOSIS — E11.9 NEW ONSET TYPE 2 DIABETES MELLITUS (HCC): ICD-10-CM

## 2025-01-08 DIAGNOSIS — R41.3 MEMORY IMPAIRMENT: Chronic | ICD-10-CM

## 2025-01-08 DIAGNOSIS — E55.9 VITAMIN D DEFICIENCY: ICD-10-CM

## 2025-01-08 DIAGNOSIS — Z86.73 HISTORY OF STROKE: ICD-10-CM

## 2025-01-08 DIAGNOSIS — E78.2 MIXED HYPERLIPIDEMIA: Chronic | ICD-10-CM

## 2025-01-08 DIAGNOSIS — M96.1 LUMBAR POST-LAMINECTOMY SYNDROME: ICD-10-CM

## 2025-01-08 DIAGNOSIS — I10 ESSENTIAL HYPERTENSION: Chronic | ICD-10-CM

## 2025-01-08 DIAGNOSIS — R60.0 EDEMA OF BOTH LOWER EXTREMITIES: ICD-10-CM

## 2025-01-08 DIAGNOSIS — G30.9 ALZHEIMER'S DISEASE, UNSPECIFIED (CODE) (HCC): ICD-10-CM

## 2025-01-08 LAB — HBA1C MFR BLD: 6.6 %

## 2025-01-08 RX ORDER — HYDROCHLOROTHIAZIDE 25 MG/1
25 TABLET ORAL DAILY
Qty: 90 TABLET | Refills: 1 | Status: SHIPPED
Start: 2025-01-08 | End: 2025-01-09 | Stop reason: SDUPTHER

## 2025-01-08 RX ORDER — SIMVASTATIN 10 MG
10 TABLET ORAL DAILY
Qty: 90 TABLET | Refills: 1 | Status: SHIPPED | OUTPATIENT
Start: 2025-01-08

## 2025-01-08 RX ORDER — GABAPENTIN 300 MG/1
300 CAPSULE ORAL 4 TIMES DAILY
Qty: 360 CAPSULE | Refills: 1 | Status: SHIPPED | OUTPATIENT
Start: 2025-01-08 | End: 2026-01-03

## 2025-01-08 RX ORDER — CLOPIDOGREL BISULFATE 75 MG/1
75 TABLET ORAL DAILY
Qty: 90 TABLET | Refills: 3 | Status: SHIPPED | OUTPATIENT
Start: 2025-01-08

## 2025-01-08 RX ORDER — MEMANTINE HYDROCHLORIDE 10 MG/1
10 TABLET ORAL 2 TIMES DAILY
Qty: 180 TABLET | Refills: 5 | Status: SHIPPED | OUTPATIENT
Start: 2025-01-08

## 2025-01-08 RX ORDER — CARVEDILOL 12.5 MG/1
12.5 TABLET ORAL 2 TIMES DAILY WITH MEALS
Qty: 180 TABLET | Refills: 3 | Status: SHIPPED | OUTPATIENT
Start: 2025-01-08

## 2025-01-08 SDOH — ECONOMIC STABILITY: FOOD INSECURITY: WITHIN THE PAST 12 MONTHS, YOU WORRIED THAT YOUR FOOD WOULD RUN OUT BEFORE YOU GOT MONEY TO BUY MORE.: NEVER TRUE

## 2025-01-08 SDOH — ECONOMIC STABILITY: FOOD INSECURITY: WITHIN THE PAST 12 MONTHS, THE FOOD YOU BOUGHT JUST DIDN'T LAST AND YOU DIDN'T HAVE MONEY TO GET MORE.: NEVER TRUE

## 2025-01-08 ASSESSMENT — PATIENT HEALTH QUESTIONNAIRE - PHQ9
9. THOUGHTS THAT YOU WOULD BE BETTER OFF DEAD, OR OF HURTING YOURSELF: NOT AT ALL
7. TROUBLE CONCENTRATING ON THINGS, SUCH AS READING THE NEWSPAPER OR WATCHING TELEVISION: NOT AT ALL
2. FEELING DOWN, DEPRESSED OR HOPELESS: SEVERAL DAYS
SUM OF ALL RESPONSES TO PHQ QUESTIONS 1-9: 1
3. TROUBLE FALLING OR STAYING ASLEEP: NOT AT ALL
SUM OF ALL RESPONSES TO PHQ9 QUESTIONS 1 & 2: 1
SUM OF ALL RESPONSES TO PHQ QUESTIONS 1-9: 1
6. FEELING BAD ABOUT YOURSELF - OR THAT YOU ARE A FAILURE OR HAVE LET YOURSELF OR YOUR FAMILY DOWN: NOT AT ALL
8. MOVING OR SPEAKING SO SLOWLY THAT OTHER PEOPLE COULD HAVE NOTICED. OR THE OPPOSITE, BEING SO FIGETY OR RESTLESS THAT YOU HAVE BEEN MOVING AROUND A LOT MORE THAN USUAL: NOT AT ALL
SUM OF ALL RESPONSES TO PHQ QUESTIONS 1-9: 1
10. IF YOU CHECKED OFF ANY PROBLEMS, HOW DIFFICULT HAVE THESE PROBLEMS MADE IT FOR YOU TO DO YOUR WORK, TAKE CARE OF THINGS AT HOME, OR GET ALONG WITH OTHER PEOPLE: NOT DIFFICULT AT ALL
SUM OF ALL RESPONSES TO PHQ QUESTIONS 1-9: 1
5. POOR APPETITE OR OVEREATING: NOT AT ALL
4. FEELING TIRED OR HAVING LITTLE ENERGY: NOT AT ALL
1. LITTLE INTEREST OR PLEASURE IN DOING THINGS: NOT AT ALL

## 2025-01-08 ASSESSMENT — ENCOUNTER SYMPTOMS
CONSTIPATION: 0
WHEEZING: 0
COUGH: 0
ABDOMINAL DISTENTION: 0
SHORTNESS OF BREATH: 0
VOMITING: 0
BLOOD IN STOOL: 0
ABDOMINAL PAIN: 0
NAUSEA: 0
CHEST TIGHTNESS: 0
CHOKING: 0
DIARRHEA: 0
BACK PAIN: 0

## 2025-01-08 NOTE — PROGRESS NOTES
(2 of 2) 01/05/2024    COVID-19 Vaccine (4 - 2023-24 season) 09/01/2024    Annual Wellness Visit (Medicare Advantage)  01/01/2025     Last colonoscopy No colonoscopy on file   No cologuard on file  No FIT/FOBT on file   No flexible sigmoidoscopy on file   Last mammogram No breast cancer screening on file   Last dexa bone scan none found.  No cervical cancer screening on file   Last PSA       Past Medical History:   Diagnosis Date    Anxiety     CVA (cerebral vascular accident) (HCC)     L FACIAL SENSATION    Depression     Diverticulitis     Hyperlipidemia     Hypertension     OA (osteoarthritis) of hip     OP (osteoporosis)     Shingles     R ABD    TIA (transient ischemic attack)     Tinnitus     Wears partial dentures     upper        Past Surgical History:   Procedure Laterality Date    BACK SURGERY      x3    CHOLECYSTECTOMY      COLONOSCOPY N/A 8/28/2019    COLONOSCOPY WITH BIOPSY performed by Kam Cobb MD at [facility] [place] [facility]COPY    COLONOSCOPY  8/28/2019    COLONOSCOPY POLYPECTOMY SNARE/COLD BIOPSY performed by Kam Cobb MD at [facility]COPY    EYE SURGERY      kuldip lense implant    HERNIA REPAIR  2018    HYSTERECTOMY (CERVIX STATUS UNKNOWN)      LUMBAR LAMINECTOMY      SPINAL CORD STIMULATOR IMPLANTATION N/A 5/6/2019    T8 SPINAL CORD STIMULATOR  TRIAL PLACEMENT--SUNITHA, [employer], [employer] SCIENTIFIC performed by Demarcus Mckinley MD at [facility] OR    SPINAL CORD STIMULATOR IMPLANTATION N/A 5/14/2019    PLACEMENT OF  PERMANENT SPINAL CORD STIMULATOR---XRAY, [employer], [employer] SCIENTIFIC performed by Demarcus Mckinley MD at [facility] OR    SPINAL CORD STIMULATOR IMPLANTATION N/A 1/9/2020    REVISION OF SPINAL CORD STIMULATOR  BATTERY AND POCKET performed by Demarcus Mckinley MD at [facility] OR    UPPER GASTROINTESTINAL ENDOSCOPY N/A 8/28/2019    EGD BIOPSY performed by Kam Cobb MD at [facility] [facility]        The ASCVD Risk score (Alondra CALL, et al., 2019) failed to calculate for the following reasons:    The 2019 ASCVD risk

## 2025-01-09 ENCOUNTER — TELEPHONE (OUTPATIENT)
Dept: FAMILY MEDICINE CLINIC | Age: 85
End: 2025-01-09

## 2025-01-09 DIAGNOSIS — I10 ESSENTIAL HYPERTENSION: Chronic | ICD-10-CM

## 2025-01-09 RX ORDER — HYDROCHLOROTHIAZIDE 25 MG/1
25 TABLET ORAL DAILY
Qty: 90 TABLET | Refills: 1 | Status: SHIPPED | OUTPATIENT
Start: 2025-01-09

## 2025-01-09 NOTE — TELEPHONE ENCOUNTER
Rx- hydroCHLOROthiazide (HYDRODIURIL) 25 MG tablet did not make it through to pharmacy, can this be resent to Walandry Mccarthy? I did call pharmacy to confirm.

## 2025-01-22 ENCOUNTER — NURSE ONLY (OUTPATIENT)
Dept: FAMILY MEDICINE CLINIC | Age: 85
End: 2025-01-22

## 2025-01-22 ENCOUNTER — HOSPITAL ENCOUNTER (OUTPATIENT)
Age: 85
Discharge: HOME | End: 2025-01-22
Payer: MEDICARE

## 2025-01-22 VITALS — OXYGEN SATURATION: 100 % | SYSTOLIC BLOOD PRESSURE: 142 MMHG | DIASTOLIC BLOOD PRESSURE: 74 MMHG | HEART RATE: 61 BPM

## 2025-01-22 DIAGNOSIS — I10 ESSENTIAL HYPERTENSION: Primary | Chronic | ICD-10-CM

## 2025-01-22 DIAGNOSIS — I10 ESSENTIAL HYPERTENSION: Chronic | ICD-10-CM

## 2025-01-22 LAB
ALBUMIN SERPL-MCNC: 4.1 G/DL (ref 3.5–5.2)
ALP SERPL-CCNC: 72 U/L (ref 35–104)
ALT SERPL-CCNC: 12 U/L (ref 0–32)
ANION GAP SERPL CALCULATED.3IONS-SCNC: 11 MMOL/L (ref 7–16)
AST SERPL-CCNC: 13 U/L (ref 0–31)
BASOPHILS # BLD: 0.07 K/UL (ref 0–0.2)
BASOPHILS NFR BLD: 1 % (ref 0–2)
BILIRUB SERPL-MCNC: 0.4 MG/DL (ref 0–1.2)
BUN SERPL-MCNC: 13 MG/DL (ref 6–23)
CALCIUM SERPL-MCNC: 9.8 MG/DL (ref 8.6–10.2)
CHLORIDE SERPL-SCNC: 90 MMOL/L (ref 98–107)
CO2 SERPL-SCNC: 30 MMOL/L (ref 22–29)
CREAT SERPL-MCNC: 0.7 MG/DL (ref 0.5–1)
EOSINOPHIL # BLD: 0.25 K/UL (ref 0.05–0.5)
EOSINOPHILS RELATIVE PERCENT: 3 % (ref 0–6)
ERYTHROCYTE [DISTWIDTH] IN BLOOD BY AUTOMATED COUNT: 11.9 % (ref 11.5–15)
GFR, ESTIMATED: 81 ML/MIN/1.73M2
GLUCOSE SERPL-MCNC: 101 MG/DL (ref 74–99)
HCT VFR BLD AUTO: 36.4 % (ref 34–48)
HGB BLD-MCNC: 12.7 G/DL (ref 11.5–15.5)
IMM GRANULOCYTES # BLD AUTO: <0.03 K/UL (ref 0–0.58)
IMM GRANULOCYTES NFR BLD: 0 % (ref 0–5)
LYMPHOCYTES NFR BLD: 3.05 K/UL (ref 1.5–4)
LYMPHOCYTES RELATIVE PERCENT: 37 % (ref 20–42)
MCH RBC QN AUTO: 31.4 PG (ref 26–35)
MCHC RBC AUTO-ENTMCNC: 34.9 G/DL (ref 32–34.5)
MCV RBC AUTO: 90.1 FL (ref 80–99.9)
MONOCYTES NFR BLD: 0.7 K/UL (ref 0.1–0.95)
MONOCYTES NFR BLD: 9 % (ref 2–12)
NEUTROPHILS NFR BLD: 51 % (ref 43–80)
NEUTS SEG NFR BLD: 4.19 K/UL (ref 1.8–7.3)
PLATELET # BLD AUTO: 343 K/UL (ref 130–450)
PMV BLD AUTO: 9.1 FL (ref 7–12)
POTASSIUM SERPL-SCNC: 3.9 MMOL/L (ref 3.5–5)
PROT SERPL-MCNC: 6.7 G/DL (ref 6.4–8.3)
RBC # BLD AUTO: 4.04 M/UL (ref 3.5–5.5)
SODIUM SERPL-SCNC: 131 MMOL/L (ref 132–146)
WBC OTHER # BLD: 8.3 K/UL (ref 4.5–11.5)

## 2025-01-22 PROCEDURE — 80053 COMPREHEN METABOLIC PANEL: CPT

## 2025-01-22 PROCEDURE — 36415 COLL VENOUS BLD VENIPUNCTURE: CPT

## 2025-01-22 PROCEDURE — 85025 COMPLETE CBC W/AUTO DIFF WBC: CPT

## 2025-01-22 NOTE — PROGRESS NOTES
Blood pressure noted to be 142/74, no change to medical regimen. Continue same antihypertensive regimen.

## 2025-01-22 NOTE — PROGRESS NOTES
Pt here for BP check.  Pt states she is taking Carvedilol 12.5 mg BID.  Denies any adverse effects from medication.

## 2025-01-23 ENCOUNTER — TELEPHONE (OUTPATIENT)
Dept: FAMILY MEDICINE CLINIC | Age: 85
End: 2025-01-23

## 2025-01-23 NOTE — TELEPHONE ENCOUNTER
Pt called back, message was given in regards to lab results. Pt is wanting to know what she can do to help bring up her Sodium level, if anything at all. Please call pt with response.

## 2025-01-23 NOTE — TELEPHONE ENCOUNTER
Her medication, HCTZ, could be the reason why her sodium is low.  Will wait and see what the value is on the repeat blood work and adjust medications accordingly.

## 2025-04-11 ENCOUNTER — OFFICE VISIT (OUTPATIENT)
Dept: FAMILY MEDICINE CLINIC | Age: 85
End: 2025-04-11
Payer: MEDICARE

## 2025-04-11 VITALS
HEIGHT: 60 IN | WEIGHT: 144 LBS | SYSTOLIC BLOOD PRESSURE: 116 MMHG | BODY MASS INDEX: 28.27 KG/M2 | DIASTOLIC BLOOD PRESSURE: 78 MMHG | TEMPERATURE: 97.1 F | RESPIRATION RATE: 17 BRPM | HEART RATE: 65 BPM | OXYGEN SATURATION: 97 %

## 2025-04-11 DIAGNOSIS — G30.9 ALZHEIMER'S DISEASE, UNSPECIFIED (CODE): ICD-10-CM

## 2025-04-11 DIAGNOSIS — M96.1 LUMBAR POST-LAMINECTOMY SYNDROME: ICD-10-CM

## 2025-04-11 DIAGNOSIS — Z86.73 HISTORY OF STROKE: ICD-10-CM

## 2025-04-11 DIAGNOSIS — R41.3 MEMORY IMPAIRMENT: ICD-10-CM

## 2025-04-11 DIAGNOSIS — E55.9 VITAMIN D DEFICIENCY: ICD-10-CM

## 2025-04-11 DIAGNOSIS — Z00.00 MEDICARE ANNUAL WELLNESS VISIT, SUBSEQUENT: Primary | ICD-10-CM

## 2025-04-11 DIAGNOSIS — E11.9 NEW ONSET TYPE 2 DIABETES MELLITUS (HCC): ICD-10-CM

## 2025-04-11 DIAGNOSIS — E78.2 MIXED HYPERLIPIDEMIA: ICD-10-CM

## 2025-04-11 DIAGNOSIS — I10 ESSENTIAL HYPERTENSION: ICD-10-CM

## 2025-04-11 LAB — HBA1C MFR BLD: 7.1 %

## 2025-04-11 PROCEDURE — 3044F HG A1C LEVEL LT 7.0%: CPT | Performed by: NEUROMUSCULOSKELETAL MEDICINE & OMM

## 2025-04-11 PROCEDURE — 1123F ACP DISCUSS/DSCN MKR DOCD: CPT | Performed by: NEUROMUSCULOSKELETAL MEDICINE & OMM

## 2025-04-11 PROCEDURE — 1159F MED LIST DOCD IN RCRD: CPT | Performed by: NEUROMUSCULOSKELETAL MEDICINE & OMM

## 2025-04-11 PROCEDURE — 3078F DIAST BP <80 MM HG: CPT | Performed by: NEUROMUSCULOSKELETAL MEDICINE & OMM

## 2025-04-11 PROCEDURE — 1160F RVW MEDS BY RX/DR IN RCRD: CPT | Performed by: NEUROMUSCULOSKELETAL MEDICINE & OMM

## 2025-04-11 PROCEDURE — 3074F SYST BP LT 130 MM HG: CPT | Performed by: NEUROMUSCULOSKELETAL MEDICINE & OMM

## 2025-04-11 PROCEDURE — 83036 HEMOGLOBIN GLYCOSYLATED A1C: CPT | Performed by: NEUROMUSCULOSKELETAL MEDICINE & OMM

## 2025-04-11 PROCEDURE — G0439 PPPS, SUBSEQ VISIT: HCPCS | Performed by: NEUROMUSCULOSKELETAL MEDICINE & OMM

## 2025-04-11 SDOH — HEALTH STABILITY: PHYSICAL HEALTH: ON AVERAGE, HOW MANY DAYS PER WEEK DO YOU ENGAGE IN MODERATE TO STRENUOUS EXERCISE (LIKE A BRISK WALK)?: 0 DAYS

## 2025-04-11 SDOH — HEALTH STABILITY: PHYSICAL HEALTH: ON AVERAGE, HOW MANY MINUTES DO YOU ENGAGE IN EXERCISE AT THIS LEVEL?: 0 MIN

## 2025-04-11 ASSESSMENT — LIFESTYLE VARIABLES
HOW OFTEN DO YOU HAVE A DRINK CONTAINING ALCOHOL: NEVER
HOW MANY STANDARD DRINKS CONTAINING ALCOHOL DO YOU HAVE ON A TYPICAL DAY: 0
HOW OFTEN DO YOU HAVE SIX OR MORE DRINKS ON ONE OCCASION: 1
HOW MANY STANDARD DRINKS CONTAINING ALCOHOL DO YOU HAVE ON A TYPICAL DAY: PATIENT DOES NOT DRINK
HOW OFTEN DO YOU HAVE A DRINK CONTAINING ALCOHOL: 1

## 2025-04-11 ASSESSMENT — PATIENT HEALTH QUESTIONNAIRE - PHQ9
5. POOR APPETITE OR OVEREATING: NOT AT ALL
1. LITTLE INTEREST OR PLEASURE IN DOING THINGS: NOT AT ALL
6. FEELING BAD ABOUT YOURSELF - OR THAT YOU ARE A FAILURE OR HAVE LET YOURSELF OR YOUR FAMILY DOWN: NOT AT ALL
SUM OF ALL RESPONSES TO PHQ QUESTIONS 1-9: 6
4. FEELING TIRED OR HAVING LITTLE ENERGY: NEARLY EVERY DAY
9. THOUGHTS THAT YOU WOULD BE BETTER OFF DEAD, OR OF HURTING YOURSELF: NOT AT ALL
7. TROUBLE CONCENTRATING ON THINGS, SUCH AS READING THE NEWSPAPER OR WATCHING TELEVISION: NOT AT ALL
SUM OF ALL RESPONSES TO PHQ QUESTIONS 1-9: 6
1. LITTLE INTEREST OR PLEASURE IN DOING THINGS: NOT AT ALL
SUM OF ALL RESPONSES TO PHQ QUESTIONS 1-9: 0
SUM OF ALL RESPONSES TO PHQ QUESTIONS 1-9: 6
SUM OF ALL RESPONSES TO PHQ QUESTIONS 1-9: 6
8. MOVING OR SPEAKING SO SLOWLY THAT OTHER PEOPLE COULD HAVE NOTICED. OR THE OPPOSITE, BEING SO FIGETY OR RESTLESS THAT YOU HAVE BEEN MOVING AROUND A LOT MORE THAN USUAL: NOT AT ALL
SUM OF ALL RESPONSES TO PHQ QUESTIONS 1-9: 0
3. TROUBLE FALLING OR STAYING ASLEEP: NEARLY EVERY DAY
SUM OF ALL RESPONSES TO PHQ QUESTIONS 1-9: 0
2. FEELING DOWN, DEPRESSED OR HOPELESS: NOT AT ALL
2. FEELING DOWN, DEPRESSED OR HOPELESS: NOT AT ALL
SUM OF ALL RESPONSES TO PHQ QUESTIONS 1-9: 0
10. IF YOU CHECKED OFF ANY PROBLEMS, HOW DIFFICULT HAVE THESE PROBLEMS MADE IT FOR YOU TO DO YOUR WORK, TAKE CARE OF THINGS AT HOME, OR GET ALONG WITH OTHER PEOPLE: NOT DIFFICULT AT ALL

## 2025-04-11 NOTE — PROGRESS NOTES
Medicare Annual Wellness Visit    Rosio Begum is here for Medicare AWV, Cough, and Fatigue    Assessment & Plan   Medicare annual wellness visit, subsequent  Essential hypertension  -     Comprehensive Metabolic Panel; Future  -     CBC with Auto Differential; Future  Mixed hyperlipidemia  -     Lipid Panel; Future  Alzheimer's disease, unspecified (CODE)  Vitamin D deficiency  -     Vitamin D 25 Hydroxy; Future  History of stroke  Lumbar post-laminectomy syndrome  Memory impairment  New onset type 2 diabetes mellitus (HCC)  -     POCT glycosylated hemoglobin (Hb A1C)  -     Albumin/Creatinine Ratio, Urine       Return in 3 months (on 7/11/2025) for Medicare Annual Wellness Visit in 1 year, to monitor medical conditions.     Subjective   The following acute and/or chronic problems were also addressed today:  Patient doing well since last office visit. Patient had a cough at night, placed on po steroids and Tessalon Pearles.    Patient's complete Health Risk Assessment and screening values have been reviewed and are found in Flowsheets. The following problems were reviewed today and where indicated follow up appointments were made and/or referrals ordered.    Positive Risk Factor Screenings with Interventions:    Fall Risk:  Do you feel unsteady or are you worried about falling? : (!) (Proxy-Rptd) yes  2 or more falls in past year?: (!) (Proxy-Rptd) yes  Fall with injury in past year?: (Proxy-Rptd) no     Interventions:    Reviewed medications, home hazards, visual acuity, and co-morbidities that can increase risk for falls     Depression:  PHQ-2 Score: 0  PHQ-9 Total Score: 6  Total Score Interpretation: 5-9 = mild depression  Interventions:  Patient declines any further evaluation or treatment          General HRA Questions:  Select all that apply: (!) (Proxy-Rptd) New or Increased Fatigue  Interventions Fatigue:  Patient declined any further interventions or treatment      Inactivity:  On average, how many

## 2025-04-11 NOTE — PATIENT INSTRUCTIONS
call for help?   Call 911 if you have symptoms of a heart attack. These may include:    Chest pain or pressure, or a strange feeling in the chest.     Sweating.     Shortness of breath.     Pain, pressure, or a strange feeling in the back, neck, jaw, or upper belly or in one or both shoulders or arms.     Lightheadedness or sudden weakness.     A fast or irregular heartbeat.   After you call 911, the  may tell you to chew 1 adult-strength or 2 to 4 low-dose aspirin. Wait for an ambulance. Do not try to drive yourself.  Watch closely for changes in your health, and be sure to contact your doctor if you have any problems.  Where can you learn more?  Go to https://www.[employer].net/patientEd and enter F075 to learn more about \"A Healthy Heart: Care Instructions.\"  Current as of: July 31, 2024  Content Version: 14.4  © 9684-8371 [employer].   Care instructions adapted under license by [employer]. If you have questions about a medical condition or this instruction, always ask your healthcare professional. [employer] disclaims any warranty or liability for your use of this information.    Personalized Preventive Plan for Rosio Begum - 4/11/2025  Medicare offers a range of preventive health benefits. Some of the tests and screenings are paid in full while other may be subject to a deductible, co-insurance, and/or copay.  Some of these benefits include a comprehensive review of your medical history including lifestyle, illnesses that may run in your family, and various assessments and screenings as appropriate.  After reviewing your medical record and screening and assessments performed today your provider may have ordered immunizations, labs, imaging, and/or referrals for you.  A list of these orders (if applicable) as well as your Preventive Care list are included within your After Visit Summary for your review.

## 2025-04-24 ENCOUNTER — CLINICAL SUPPORT (OUTPATIENT)
Dept: FAMILY MEDICINE CLINIC | Age: 85
End: 2025-04-24

## 2025-04-24 ENCOUNTER — HOSPITAL ENCOUNTER (OUTPATIENT)
Age: 85
Discharge: HOME | End: 2025-04-24
Payer: MEDICARE

## 2025-04-24 ENCOUNTER — RESULTS FOLLOW-UP (OUTPATIENT)
Dept: INTERNAL MEDICINE CLINIC | Age: 85
End: 2025-04-24

## 2025-04-24 DIAGNOSIS — I10 ESSENTIAL HYPERTENSION: ICD-10-CM

## 2025-04-24 DIAGNOSIS — E87.1 HYPONATREMIA: Primary | ICD-10-CM

## 2025-04-24 DIAGNOSIS — E78.2 MIXED HYPERLIPIDEMIA: ICD-10-CM

## 2025-04-24 DIAGNOSIS — E11.9 NEW ONSET TYPE 2 DIABETES MELLITUS (HCC): Primary | ICD-10-CM

## 2025-04-24 DIAGNOSIS — E55.9 VITAMIN D DEFICIENCY: ICD-10-CM

## 2025-04-24 DIAGNOSIS — E11.9 NEW ONSET TYPE 2 DIABETES MELLITUS (HCC): ICD-10-CM

## 2025-04-24 LAB
25(OH)D3 SERPL-MCNC: 34.6 NG/ML (ref 30–100)
ALBUMIN SERPL-MCNC: 3.9 G/DL (ref 3.5–5.2)
ALP SERPL-CCNC: 63 U/L (ref 35–104)
ALT SERPL-CCNC: 11 U/L (ref 0–32)
ANION GAP SERPL CALCULATED.3IONS-SCNC: 11 MMOL/L (ref 7–16)
AST SERPL-CCNC: 14 U/L (ref 0–31)
BASOPHILS # BLD: 0.06 K/UL (ref 0–0.2)
BASOPHILS NFR BLD: 1 % (ref 0–2)
BILIRUB SERPL-MCNC: 0.4 MG/DL (ref 0–1.2)
BUN SERPL-MCNC: 13 MG/DL (ref 6–23)
CALCIUM SERPL-MCNC: 9.7 MG/DL (ref 8.6–10.2)
CHLORIDE SERPL-SCNC: 88 MMOL/L (ref 98–107)
CHOLEST SERPL-MCNC: 142 MG/DL
CO2 SERPL-SCNC: 28 MMOL/L (ref 22–29)
CREAT SERPL-MCNC: 0.8 MG/DL (ref 0.5–1)
CREATININE URINE: 44.4 MG/DL (ref 29–226)
EOSINOPHIL # BLD: 0.44 K/UL (ref 0.05–0.5)
EOSINOPHILS RELATIVE PERCENT: 5 % (ref 0–6)
ERYTHROCYTE [DISTWIDTH] IN BLOOD BY AUTOMATED COUNT: 11.9 % (ref 11.5–15)
GFR, ESTIMATED: 73 ML/MIN/1.73M2
GLUCOSE SERPL-MCNC: 153 MG/DL (ref 74–99)
HCT VFR BLD AUTO: 36.1 % (ref 34–48)
HDLC SERPL-MCNC: 44 MG/DL
HGB BLD-MCNC: 12.6 G/DL (ref 11.5–15.5)
IMM GRANULOCYTES # BLD AUTO: 0.04 K/UL (ref 0–0.58)
IMM GRANULOCYTES NFR BLD: 1 % (ref 0–5)
LDLC SERPL CALC-MCNC: 70 MG/DL
LYMPHOCYTES NFR BLD: 2.2 K/UL (ref 1.5–4)
LYMPHOCYTES RELATIVE PERCENT: 26 % (ref 20–42)
MCH RBC QN AUTO: 31.9 PG (ref 26–35)
MCHC RBC AUTO-ENTMCNC: 34.9 G/DL (ref 32–34.5)
MCV RBC AUTO: 91.4 FL (ref 80–99.9)
MICROALBUMIN/CREAT 24H UR: <12 MG/L (ref 0–20)
MICROALBUMIN/CREAT UR-RTO: <27 MCG/MG CREAT (ref 0–30)
MONOCYTES NFR BLD: 0.87 K/UL (ref 0.1–0.95)
MONOCYTES NFR BLD: 10 % (ref 2–12)
NEUTROPHILS NFR BLD: 57 % (ref 43–80)
NEUTS SEG NFR BLD: 4.72 K/UL (ref 1.8–7.3)
PLATELET # BLD AUTO: 337 K/UL (ref 130–450)
PMV BLD AUTO: 8.6 FL (ref 7–12)
POTASSIUM SERPL-SCNC: 4.7 MMOL/L (ref 3.5–5)
PROT SERPL-MCNC: 6.8 G/DL (ref 6.4–8.3)
RBC # BLD AUTO: 3.95 M/UL (ref 3.5–5.5)
SODIUM SERPL-SCNC: 127 MMOL/L (ref 132–146)
TRIGL SERPL-MCNC: 143 MG/DL
VLDLC SERPL CALC-MCNC: 29 MG/DL
WBC OTHER # BLD: 8.3 K/UL (ref 4.5–11.5)

## 2025-04-24 PROCEDURE — 80061 LIPID PANEL: CPT

## 2025-04-24 PROCEDURE — 36415 COLL VENOUS BLD VENIPUNCTURE: CPT

## 2025-04-24 PROCEDURE — 85025 COMPLETE CBC W/AUTO DIFF WBC: CPT

## 2025-04-24 PROCEDURE — 82306 VITAMIN D 25 HYDROXY: CPT

## 2025-04-24 PROCEDURE — 80053 COMPREHEN METABOLIC PANEL: CPT

## 2025-04-24 NOTE — RESULT ENCOUNTER NOTE
Let patient know I reviewed the following blood work:    CMP showed a low sodium of 127, low chloride at 88, and glucose of 153.  Kidney and liver functions unremarkable.  Will need to recheck a BMP in 2 weeks regarding her hyponatremia.    Pending at this time is vitamin D level, lipid panel, and urine albumin creatinine ratio.

## 2025-04-25 ENCOUNTER — RESULTS FOLLOW-UP (OUTPATIENT)
Dept: FAMILY MEDICINE CLINIC | Age: 85
End: 2025-04-25

## 2025-04-25 NOTE — RESULT ENCOUNTER NOTE
Let patient know I reviewed the following blood work:    Vitamin D level normal at 34.6, however I would recommend starting vitamin D3 at 2000 IUs daily.  Will recheck vitamin D level in 3 to 6 months.    Lipid panel shows LDL 70, triglycerides 143, and HDL 44.  No change to medical regimen based on lipid panel results.

## 2025-04-25 NOTE — RESULT ENCOUNTER NOTE
Let patient know I reviewed her urine studies:    Urine albumin normal at less than 12, and urine creatinine normal at 44.4, and microalbumin/creatinine ratio normal at less than 27.  No further workup needed at this time.

## 2025-07-07 DIAGNOSIS — I10 ESSENTIAL HYPERTENSION: Chronic | ICD-10-CM

## 2025-07-07 DIAGNOSIS — E11.9 NEW ONSET TYPE 2 DIABETES MELLITUS (HCC): ICD-10-CM

## 2025-07-07 RX ORDER — HYDROCHLOROTHIAZIDE 25 MG/1
25 TABLET ORAL DAILY
Qty: 90 TABLET | Refills: 1 | Status: SHIPPED | OUTPATIENT
Start: 2025-07-07 | End: 2025-07-11 | Stop reason: SDUPTHER

## 2025-07-07 NOTE — TELEPHONE ENCOUNTER
Name of Medication(s) Requested:  Requested Prescriptions     Pending Prescriptions Disp Refills    metFORMIN (GLUCOPHAGE) 500 MG tablet 90 tablet 1     Sig: Take 1 tablet by mouth daily (with breakfast)       Medication is on current medication list Yes    Dosage and directions were verified? Yes    Quantity verified: 90 day supply     Pharmacy Verified?  Yes    Last Appointment:  4/11/2025    Future appts:  Future Appointments   Date Time Provider Department Center   7/11/2025  2:00 PM Jesús Doshi DO [place] DE[facility]        (If no appt send self scheduling link. .REFILLAPPT)  Scheduling request sent?     [] Yes  [x] No    Does patient need updated?  [] Yes  [x] No

## 2025-07-11 ENCOUNTER — OFFICE VISIT (OUTPATIENT)
Dept: FAMILY MEDICINE CLINIC | Age: 85
End: 2025-07-11
Payer: MEDICARE

## 2025-07-11 VITALS
RESPIRATION RATE: 18 BRPM | DIASTOLIC BLOOD PRESSURE: 68 MMHG | HEIGHT: 60 IN | HEART RATE: 72 BPM | OXYGEN SATURATION: 98 % | TEMPERATURE: 97.4 F | SYSTOLIC BLOOD PRESSURE: 122 MMHG | WEIGHT: 157 LBS | BODY MASS INDEX: 30.82 KG/M2

## 2025-07-11 DIAGNOSIS — R05.3 PERSISTENT DRY COUGH: Primary | ICD-10-CM

## 2025-07-11 DIAGNOSIS — E78.2 MIXED HYPERLIPIDEMIA: Chronic | ICD-10-CM

## 2025-07-11 DIAGNOSIS — E11.9 DIET-CONTROLLED DIABETES MELLITUS (HCC): ICD-10-CM

## 2025-07-11 DIAGNOSIS — E11.9 NEW ONSET TYPE 2 DIABETES MELLITUS (HCC): ICD-10-CM

## 2025-07-11 DIAGNOSIS — B00.1 HERPES LABIALIS WITHOUT COMPLICATION: ICD-10-CM

## 2025-07-11 DIAGNOSIS — I10 ESSENTIAL HYPERTENSION: Chronic | ICD-10-CM

## 2025-07-11 DIAGNOSIS — M96.1 LUMBAR POST-LAMINECTOMY SYNDROME: ICD-10-CM

## 2025-07-11 LAB — HBA1C MFR BLD: 7.1 %

## 2025-07-11 PROCEDURE — 1159F MED LIST DOCD IN RCRD: CPT | Performed by: NEUROMUSCULOSKELETAL MEDICINE & OMM

## 2025-07-11 PROCEDURE — 83036 HEMOGLOBIN GLYCOSYLATED A1C: CPT | Performed by: NEUROMUSCULOSKELETAL MEDICINE & OMM

## 2025-07-11 PROCEDURE — G2211 COMPLEX E/M VISIT ADD ON: HCPCS | Performed by: NEUROMUSCULOSKELETAL MEDICINE & OMM

## 2025-07-11 PROCEDURE — 3051F HG A1C>EQUAL 7.0%<8.0%: CPT | Performed by: NEUROMUSCULOSKELETAL MEDICINE & OMM

## 2025-07-11 PROCEDURE — 3078F DIAST BP <80 MM HG: CPT | Performed by: NEUROMUSCULOSKELETAL MEDICINE & OMM

## 2025-07-11 PROCEDURE — 1160F RVW MEDS BY RX/DR IN RCRD: CPT | Performed by: NEUROMUSCULOSKELETAL MEDICINE & OMM

## 2025-07-11 PROCEDURE — 3074F SYST BP LT 130 MM HG: CPT | Performed by: NEUROMUSCULOSKELETAL MEDICINE & OMM

## 2025-07-11 PROCEDURE — 1123F ACP DISCUSS/DSCN MKR DOCD: CPT | Performed by: NEUROMUSCULOSKELETAL MEDICINE & OMM

## 2025-07-11 PROCEDURE — 99214 OFFICE O/P EST MOD 30 MIN: CPT | Performed by: NEUROMUSCULOSKELETAL MEDICINE & OMM

## 2025-07-11 RX ORDER — HYDROCHLOROTHIAZIDE 25 MG/1
25 TABLET ORAL DAILY
Qty: 90 TABLET | Refills: 1 | Status: SHIPPED | OUTPATIENT
Start: 2025-07-11

## 2025-07-11 RX ORDER — GABAPENTIN 300 MG/1
300 CAPSULE ORAL 3 TIMES DAILY
Qty: 270 CAPSULE | Refills: 1 | Status: SHIPPED | OUTPATIENT
Start: 2025-07-11 | End: 2026-04-07

## 2025-07-11 RX ORDER — VALACYCLOVIR HYDROCHLORIDE 1 G/1
TABLET, FILM COATED ORAL
Qty: 4 TABLET | Refills: 2 | Status: SHIPPED | OUTPATIENT
Start: 2025-07-11

## 2025-07-11 RX ORDER — SIMVASTATIN 10 MG
10 TABLET ORAL DAILY
Qty: 90 TABLET | Refills: 1 | Status: SHIPPED | OUTPATIENT
Start: 2025-07-11

## 2025-07-11 RX ORDER — GABAPENTIN 300 MG/1
300 CAPSULE ORAL 4 TIMES DAILY
Qty: 360 CAPSULE | Refills: 1 | Status: SHIPPED | OUTPATIENT
Start: 2025-07-11 | End: 2025-07-11

## 2025-07-11 ASSESSMENT — ENCOUNTER SYMPTOMS
CHEST TIGHTNESS: 0
COUGH: 1
NAUSEA: 0
WHEEZING: 0
VOMITING: 0
BACK PAIN: 0
STRIDOR: 0
CHOKING: 0
SHORTNESS OF BREATH: 0
ABDOMINAL PAIN: 0
DIARRHEA: 0

## 2025-07-11 NOTE — PROGRESS NOTES
Rosio Begum (:  1940) is a 85 y.o. female, Established patient, here for evaluation of the following chief complaint(s):  Hypertension, Gastroesophageal Reflux, Diabetes, Hyperlipidemia, and Cough         Assessment & Plan  1. Hypertension  - Hydrochlorothiazide 25 mg once daily will be refilled.  - Blood pressure management will continue as per current regimen.    2. Gastroesophageal Reflux Disease (GERD)  - Reports no current heartburn symptoms.  - No changes to current GERD management plan.    3. Diabetes Mellitus  - A1c remains at 7.1, consistent with the level recorded in 2025.  - Advised to reduce ice cream intake to 2-3 times per week to help lower A1c levels and potentially increase energy levels.  - Metformin 500 mg once daily with breakfast will be refilled.    4. Hyperlipidemia  - Zocor 10 mg once daily will be refilled.  - Lipid management will continue as per current regimen.    5. Chronic Cough  - Persistent dry cough noted, no phlegm or mucus production.  - Chest x-ray ordered to investigate the cause.  - Tessalon Perles previously used without effect; no new cough medication prescribed.    6. Neuropathy  - Reports persistent numbness and tingling in feet, likely related to back condition.  - Gabapentin dosage will be reduced to 300 mg twice in the morning and once at night for 90 days (total of 270 pills) to see if it improves gait and steadiness.    7. Memory Loss  - Currently on Namenda twice a day, no significant improvement noted.  - No changes to current medication regimen.    8. Skin Lesion  - Prescription for Valtrex provided, with instructions to take 2 pills initially, wait 12 hours, and then take another 2 pills.  - Refills for Valtrex will also be provided.    Follow-up  - Follow-up appointment scheduled in 6 weeks.    Results  Labs   - A1c: 7.1%  1. Persistent dry cough  -     XR CHEST (2 VW)  2. Diet-controlled diabetes mellitus (HCC)  -     POCT glycosylated

## 2025-08-22 ENCOUNTER — HOSPITAL ENCOUNTER (OUTPATIENT)
Dept: LAB | Age: 85
Discharge: HOME | End: 2025-08-22
Payer: MEDICARE

## 2025-08-22 ENCOUNTER — OFFICE VISIT (OUTPATIENT)
Dept: FAMILY MEDICINE CLINIC | Age: 85
End: 2025-08-22
Payer: MEDICARE

## 2025-08-22 VITALS
OXYGEN SATURATION: 98 % | WEIGHT: 155 LBS | HEART RATE: 84 BPM | SYSTOLIC BLOOD PRESSURE: 116 MMHG | HEIGHT: 60 IN | BODY MASS INDEX: 30.43 KG/M2 | TEMPERATURE: 98 F | DIASTOLIC BLOOD PRESSURE: 64 MMHG

## 2025-08-22 DIAGNOSIS — R53.82 CHRONIC FATIGUE: ICD-10-CM

## 2025-08-22 DIAGNOSIS — R41.3 MEMORY IMPAIRMENT: ICD-10-CM

## 2025-08-22 DIAGNOSIS — E87.1 HYPONATREMIA: ICD-10-CM

## 2025-08-22 DIAGNOSIS — Z86.73 HISTORY OF STROKE: ICD-10-CM

## 2025-08-22 DIAGNOSIS — M96.1 FAILED BACK SYNDROME: ICD-10-CM

## 2025-08-22 DIAGNOSIS — M96.1 LUMBAR POST-LAMINECTOMY SYNDROME: ICD-10-CM

## 2025-08-22 DIAGNOSIS — G30.9 ALZHEIMER'S DISEASE, UNSPECIFIED (CODE) (HCC): ICD-10-CM

## 2025-08-22 DIAGNOSIS — E78.2 MIXED HYPERLIPIDEMIA: Chronic | ICD-10-CM

## 2025-08-22 DIAGNOSIS — E55.9 VITAMIN D DEFICIENCY: ICD-10-CM

## 2025-08-22 DIAGNOSIS — I10 ESSENTIAL HYPERTENSION: Primary | ICD-10-CM

## 2025-08-22 LAB
ALBUMIN SERPL-MCNC: 4.4 G/DL (ref 3.5–5.2)
ALP SERPL-CCNC: 70 U/L (ref 35–104)
ALT SERPL-CCNC: 15 U/L (ref 0–35)
ANION GAP SERPL CALCULATED.3IONS-SCNC: 14 MMOL/L (ref 7–16)
AST SERPL-CCNC: 18 U/L (ref 0–35)
BASOPHILS # BLD: 0.09 K/UL (ref 0–0.2)
BASOPHILS NFR BLD: 1 % (ref 0–2)
BILIRUB SERPL-MCNC: 0.3 MG/DL (ref 0–1.2)
BUN SERPL-MCNC: 16 MG/DL (ref 8–23)
CALCIUM SERPL-MCNC: 9.8 MG/DL (ref 8.8–10.2)
CHLORIDE SERPL-SCNC: 90 MMOL/L (ref 98–107)
CO2 SERPL-SCNC: 28 MMOL/L (ref 22–29)
CREAT SERPL-MCNC: 0.8 MG/DL (ref 0.5–1)
EOSINOPHIL # BLD: 0.69 K/UL (ref 0.05–0.5)
EOSINOPHILS RELATIVE PERCENT: 7 % (ref 0–6)
ERYTHROCYTE [DISTWIDTH] IN BLOOD BY AUTOMATED COUNT: 11.9 % (ref 11.5–15)
GFR, ESTIMATED: 76 ML/MIN/1.73M2
GLUCOSE SERPL-MCNC: 178 MG/DL (ref 74–99)
HCT VFR BLD AUTO: 36.2 % (ref 34–48)
HGB BLD-MCNC: 12.8 G/DL (ref 11.5–15.5)
IMM GRANULOCYTES # BLD AUTO: 0.07 K/UL (ref 0–0.58)
IMM GRANULOCYTES NFR BLD: 1 % (ref 0–5)
LYMPHOCYTES NFR BLD: 3.08 K/UL (ref 1.5–4)
LYMPHOCYTES RELATIVE PERCENT: 30 % (ref 20–42)
MCH RBC QN AUTO: 32.7 PG (ref 26–35)
MCHC RBC AUTO-ENTMCNC: 35.4 G/DL (ref 32–34.5)
MCV RBC AUTO: 92.6 FL (ref 80–99.9)
MONOCYTES NFR BLD: 0.95 K/UL (ref 0.1–0.95)
MONOCYTES NFR BLD: 9 % (ref 2–12)
NEUTROPHILS NFR BLD: 52 % (ref 43–80)
NEUTS SEG NFR BLD: 5.34 K/UL (ref 1.8–7.3)
PLATELET # BLD AUTO: 368 K/UL (ref 130–450)
PMV BLD AUTO: 8.3 FL (ref 7–12)
POTASSIUM SERPL-SCNC: 3.9 MMOL/L (ref 3.5–5.1)
PROT SERPL-MCNC: 6.9 G/DL (ref 6.4–8.3)
RBC # BLD AUTO: 3.91 M/UL (ref 3.5–5.5)
SODIUM SERPL-SCNC: 132 MMOL/L (ref 136–145)
TSH SERPL DL<=0.05 MIU/L-ACNC: 2.41 UIU/ML (ref 0.27–4.2)
WBC OTHER # BLD: 10.2 K/UL (ref 4.5–11.5)

## 2025-08-22 PROCEDURE — 36415 COLL VENOUS BLD VENIPUNCTURE: CPT

## 2025-08-22 PROCEDURE — 80053 COMPREHEN METABOLIC PANEL: CPT

## 2025-08-22 PROCEDURE — G2211 COMPLEX E/M VISIT ADD ON: HCPCS | Performed by: NEUROMUSCULOSKELETAL MEDICINE & OMM

## 2025-08-22 PROCEDURE — 1160F RVW MEDS BY RX/DR IN RCRD: CPT | Performed by: NEUROMUSCULOSKELETAL MEDICINE & OMM

## 2025-08-22 PROCEDURE — 1159F MED LIST DOCD IN RCRD: CPT | Performed by: NEUROMUSCULOSKELETAL MEDICINE & OMM

## 2025-08-22 PROCEDURE — 3074F SYST BP LT 130 MM HG: CPT | Performed by: NEUROMUSCULOSKELETAL MEDICINE & OMM

## 2025-08-22 PROCEDURE — 3078F DIAST BP <80 MM HG: CPT | Performed by: NEUROMUSCULOSKELETAL MEDICINE & OMM

## 2025-08-22 PROCEDURE — 84443 ASSAY THYROID STIM HORMONE: CPT

## 2025-08-22 PROCEDURE — 85025 COMPLETE CBC W/AUTO DIFF WBC: CPT

## 2025-08-22 PROCEDURE — 1123F ACP DISCUSS/DSCN MKR DOCD: CPT | Performed by: NEUROMUSCULOSKELETAL MEDICINE & OMM

## 2025-08-22 PROCEDURE — 99214 OFFICE O/P EST MOD 30 MIN: CPT | Performed by: NEUROMUSCULOSKELETAL MEDICINE & OMM

## 2025-08-22 RX ORDER — SIMVASTATIN 10 MG
10 TABLET ORAL DAILY
Qty: 90 TABLET | Refills: 1 | Status: SHIPPED | OUTPATIENT
Start: 2025-08-22

## 2025-08-22 RX ORDER — GABAPENTIN 300 MG/1
300 CAPSULE ORAL 3 TIMES DAILY
Qty: 270 CAPSULE | Refills: 1 | Status: SHIPPED | OUTPATIENT
Start: 2025-08-22 | End: 2025-08-22

## 2025-08-22 RX ORDER — GABAPENTIN 300 MG/1
300 CAPSULE ORAL 2 TIMES DAILY
Qty: 180 CAPSULE | Refills: 0 | Status: SHIPPED | OUTPATIENT
Start: 2025-08-22 | End: 2025-11-20

## 2025-08-22 ASSESSMENT — ENCOUNTER SYMPTOMS
ABDOMINAL PAIN: 0
VOMITING: 0
CHEST TIGHTNESS: 0
CONSTIPATION: 0
CHOKING: 0
NAUSEA: 0
WHEEZING: 0
COUGH: 0
BLOOD IN STOOL: 0
SHORTNESS OF BREATH: 0
DIARRHEA: 0

## (undated) DEVICE — 1000 S-DRAPE TOWEL DRAPE 10/BX: Brand: STERI-DRAPE™

## (undated) DEVICE — EXTRAS UGOKWE

## (undated) DEVICE — GLOVE SURG SZ 65 THK91MIL LTX FREE SYN POLYISOPRENE

## (undated) DEVICE — INTENDED FOR TISSUE SEPARATION, AND OTHER PROCEDURES THAT REQUIRE A SHARP SURGICAL BLADE TO PUNCTURE OR CUT.: Brand: BARD-PARKER ® STAINLESS STEEL BLADES

## (undated) DEVICE — DEFENDO AIR WATER SUCTION AND BIOPSY VALVE KIT FOR  OLYMPUS: Brand: DEFENDO AIR/WATER/SUCTION AND BIOPSY VALVE

## (undated) DEVICE — GRADUATE

## (undated) DEVICE — SET SPINAL NEURO STNEU1

## (undated) DEVICE — SOLUTION IV IRRIG WATER 1000ML POUR BRL 2F7114

## (undated) DEVICE — SURGICAL PROCEDURE PACK LAMINECTOMY LUMBAR

## (undated) DEVICE — TUBING SUCT 12FR MAL ALUM SHFT FN CAP VENT UNIV CONN W/ OBT

## (undated) DEVICE — GLOVE ORANGE PI 8   MSG9080

## (undated) DEVICE — CODMAN® SURGICAL PATTIES 1/2" X 1" (1.27CM X 2.54CM): Brand: CODMAN®

## (undated) DEVICE — CONTAINER SPEC 60ML PH 7NEUTRAL BUFF FRMLN RDY TO USE

## (undated) DEVICE — SYRINGE 20ML LL S/C 50

## (undated) DEVICE — DOUBLE BASIN SET: Brand: MEDLINE INDUSTRIES, INC.

## (undated) DEVICE — LABEL MED 4 IN SURG PANEL W/ PEN STRL

## (undated) DEVICE — HYPODERMIC SAFETY NEEDLE: Brand: MAGELLAN

## (undated) DEVICE — CLOTH SURG PREP PREOPERATIVE CHLORHEXIDINE GLUC 2% READYPREP

## (undated) DEVICE — GOWN,SIRUS,FABRNF,L,20/CS: Brand: MEDLINE

## (undated) DEVICE — KIT PT TRL HANDHELD COMB THER WAVEFORM AUTOMATION FOR SPNL

## (undated) DEVICE — Device

## (undated) DEVICE — STAPLER SKIN L39MM DIA0.53MM CRWN 5.7MM S STL FIX HD PROX

## (undated) DEVICE — GLOVE ORANGE PI 7   MSG9070

## (undated) DEVICE — DRAPE CAM W7XL96IN W/ BLU KRATON TIP FOR LSR VID

## (undated) DEVICE — SKIN AFFIX SURG ADHESIVE 72/CS 0.55ML: Brand: MEDLINE

## (undated) DEVICE — ELEVATOR NEUROSTIMULATOR SPNL PASS FOR SPNL CRD STIM SYS

## (undated) DEVICE — 3M(TM) MEDIPORE(TM) +PAD SOFT CLOTH ADHESIVE WOUND DRESSING 3569: Brand: 3M™ MEDIPORE™

## (undated) DEVICE — CABLE SURG L60CM EXTN NEUROMODULATION PRECIS SPECTR

## (undated) DEVICE — READY WET SKIN SCRUB TRAY-LF: Brand: MEDLINE INDUSTRIES, INC.

## (undated) DEVICE — GLOVE SURG SZ 85 STD WHT LTX SYN POLYMER BEAD REINF ANTI RL

## (undated) DEVICE — MARKER,SKIN,WI/RULER AND LABELS: Brand: MEDLINE

## (undated) DEVICE — E-Z CLEAN, NON-STICK, PTFE COATED, ELECTROSURGICAL BLADE ELECTRODE, 6.5 INCH (16.5 CM): Brand: MEGADYNE

## (undated) DEVICE — APPLICATOR PREP 26ML 0.7% IOD POVACRYLEX 74% ISO ALC ST

## (undated) DEVICE — CONNECTOR TBNG AUX H2O JET DISP FOR OLY 160/180 SER

## (undated) DEVICE — TUBING, SUCTION, 3/16" X 12', STRAIGHT: Brand: MEDLINE

## (undated) DEVICE — CANNULA NSL ORAL AD FOR CAPNOFLEX CO2 O2 AIRLFE

## (undated) DEVICE — BLADE CLP TAPR HD WET DRY CAPABILITY GTT IN CHARGING USE

## (undated) DEVICE — GAUZE,SPONGE,POST-OP,4X3,STRL,LF: Brand: MEDLINE

## (undated) DEVICE — KIT POS W/ FOAM ARM CRADL SHEARGUARD CHST PD CVR FOR SPNL

## (undated) DEVICE — BLOCK BITE 60FR RUBBER ADLT DENTAL

## (undated) DEVICE — 1.5L THIN WALL CAN: Brand: CRD

## (undated) DEVICE — JACKSON TABLE POSITIONER KIT: Brand: MEDLINE INDUSTRIES, INC.

## (undated) DEVICE — 3M™ IOBAN™ 2 ANTIMICROBIAL INCISE DRAPE 6650EZ: Brand: IOBAN™ 2

## (undated) DEVICE — FORCEPS BX L160CM JAW DIA2.4MM YEL L CAP W/ NDL DISP RAD

## (undated) DEVICE — INCISE SURGICAL DRAPE: Brand: OPSITE INCISE 28X30CM CTN 10

## (undated) DEVICE — 3M™ MEDIPORE™ + PAD 3564: Brand: 3M™ MEDIPORE™

## (undated) DEVICE — 35CM LONG TUNNELING TOOL

## (undated) DEVICE — WRENCH SURG L76CM SPNL CRD HEX STIM SYS SURG EQUIP PRECIS

## (undated) DEVICE — SHEET, T, LAPAROTOMY, STERILE: Brand: MEDLINE

## (undated) DEVICE — REMOTE CONTROL KIT: Brand: FREELINK™

## (undated) DEVICE — FORCEPS BX L240CM JAW DIA2.4MM WRK CHN 2.8MM ORNG L CAP W/